# Patient Record
Sex: FEMALE | Race: WHITE | NOT HISPANIC OR LATINO | Employment: FULL TIME | URBAN - NONMETROPOLITAN AREA
[De-identification: names, ages, dates, MRNs, and addresses within clinical notes are randomized per-mention and may not be internally consistent; named-entity substitution may affect disease eponyms.]

---

## 2017-02-08 ENCOUNTER — OFFICE VISIT (OUTPATIENT)
Dept: PSYCHIATRY | Facility: OTHER | Age: 32
End: 2017-02-08
Attending: PSYCHIATRY & NEUROLOGY
Payer: COMMERCIAL

## 2017-02-08 VITALS
WEIGHT: 158 LBS | SYSTOLIC BLOOD PRESSURE: 134 MMHG | TEMPERATURE: 97.8 F | DIASTOLIC BLOOD PRESSURE: 96 MMHG | HEIGHT: 65 IN | BODY MASS INDEX: 26.33 KG/M2 | HEART RATE: 95 BPM

## 2017-02-08 DIAGNOSIS — F33.1 MAJOR DEPRESSIVE DISORDER, RECURRENT EPISODE, MODERATE (H): ICD-10-CM

## 2017-02-08 DIAGNOSIS — F90.2 ADHD (ATTENTION DEFICIT HYPERACTIVITY DISORDER), COMBINED TYPE: Primary | ICD-10-CM

## 2017-02-08 PROCEDURE — 99214 OFFICE O/P EST MOD 30 MIN: CPT | Performed by: PSYCHIATRY & NEUROLOGY

## 2017-02-08 RX ORDER — LAMOTRIGINE 25 MG/1
TABLET ORAL
Qty: 90 TABLET | Refills: 3 | Status: SHIPPED | OUTPATIENT
Start: 2017-02-08 | End: 2017-04-14

## 2017-02-08 RX ORDER — LISDEXAMFETAMINE DIMESYLATE 60 MG/1
60 CAPSULE ORAL EVERY MORNING
Qty: 30 CAPSULE | Refills: 0 | Status: SHIPPED | OUTPATIENT
Start: 2017-02-08 | End: 2017-05-12

## 2017-02-08 RX ORDER — LISDEXAMFETAMINE DIMESYLATE 60 MG/1
60 CAPSULE ORAL EVERY MORNING
Qty: 30 CAPSULE | Refills: 0 | Status: SHIPPED | OUTPATIENT
Start: 2017-04-05 | End: 2017-05-30

## 2017-02-08 RX ORDER — LISDEXAMFETAMINE DIMESYLATE 60 MG/1
60 CAPSULE ORAL EVERY MORNING
Qty: 30 CAPSULE | Refills: 0 | Status: SHIPPED | OUTPATIENT
Start: 2017-03-08 | End: 2017-05-30

## 2017-02-08 ASSESSMENT — ANXIETY QUESTIONNAIRES
6. BECOMING EASILY ANNOYED OR IRRITABLE: MORE THAN HALF THE DAYS
7. FEELING AFRAID AS IF SOMETHING AWFUL MIGHT HAPPEN: SEVERAL DAYS
IF YOU CHECKED OFF ANY PROBLEMS ON THIS QUESTIONNAIRE, HOW DIFFICULT HAVE THESE PROBLEMS MADE IT FOR YOU TO DO YOUR WORK, TAKE CARE OF THINGS AT HOME, OR GET ALONG WITH OTHER PEOPLE: VERY DIFFICULT
5. BEING SO RESTLESS THAT IT IS HARD TO SIT STILL: NOT AT ALL
2. NOT BEING ABLE TO STOP OR CONTROL WORRYING: NEARLY EVERY DAY
3. WORRYING TOO MUCH ABOUT DIFFERENT THINGS: MORE THAN HALF THE DAYS
GAD7 TOTAL SCORE: 12
1. FEELING NERVOUS, ANXIOUS, OR ON EDGE: MORE THAN HALF THE DAYS

## 2017-02-08 ASSESSMENT — PATIENT HEALTH QUESTIONNAIRE - PHQ9: 5. POOR APPETITE OR OVEREATING: MORE THAN HALF THE DAYS

## 2017-02-08 ASSESSMENT — PAIN SCALES - GENERAL: PAINLEVEL: NO PAIN (0)

## 2017-02-08 NOTE — NURSING NOTE
"Chief Complaint   Patient presents with     RECHECK     Mental health.       Initial /96 mmHg  Pulse 95  Temp(Src) 97.8  F (36.6  C) (Tympanic)  Ht 5' 5\" (1.651 m)  Wt 158 lb (71.668 kg)  BMI 26.29 kg/m2 Estimated body mass index is 26.29 kg/(m^2) as calculated from the following:    Height as of this encounter: 5' 5\" (1.651 m).    Weight as of this encounter: 158 lb (71.668 kg).  Medication Reconciliation: complete     SHEYLA BECERRA      "

## 2017-02-08 NOTE — PROGRESS NOTES
"  PSYCHIATRY CLINIC PROGRESS NOTE   20 minute medication management, more than 50% of time spent counseling patient on medications, medication side effects, symptom history and management   SUBJECTIVE / INTERIM HISTORY                                                                        Social- With BF and 10 yo daughter, BF kid is 12 yo  Children- [place] is 10 yo daughter     Last visit 11/9/16:      -- Continue vyanse 60 mg daily and gave script for dec and Jan. She still has script from Oct and will  Vyvanse this Friday - hasn't had the $ to  script       -- Continue Lamictal 75 mg HS    - off Lamictal hasn't been able to get from pharmacy  - was off Vyvanse for awhile - couldn't focus, wasn't able to get her work done at [employer]. \"my brain won't shut off\"  - moved in with parents try help given now  and working 60+ hours per week.  - dating Sukh , [employer], treats her great. Started dating again this summer  -  was off meds past couple years. Most recently: vyvanse and tried Luvox 100 mg HS. Since stopped taking Luvox because -> BF found her passed out, muscles tensed up. She notes no other meds on board or alcohol  - has tendency think meds not working and stop taking but did NOT do that this time    SUBSTANCE USE- reports no issues    SYMPTOMS-   SIB: Burning (last was in Oct '15) , Overwhelming, irritability, SIB  were becoming more of an issue again so she started working with therapist Hugh Uribe.  Irritability gotten better and moods bit better. Stress with uncertainly, lack of control  MEDICAL ROS-  Fatigue, headahces, constipation  MEDICAL / SURGICAL HISTORY                pregnant [if applicable]--no     Patient Active Problem List   Diagnosis     Family planning     Moderate major depression (H)     Anxiety     Chronic fatigue     Encounter for routine gynecological examination     Decreased libido     Fatigue     Smoker     ASCUS (atypical squamous cells of " "undetermined significance) on Pap smear     NO SHOW     ACP (advance care planning)     ALLERGY   Dust mites  MEDICATIONS                                                                                             Current Outpatient Prescriptions   Medication Sig     lisdexamfetamine (VYVANSE) 60 MG capsule Take 1 capsule (60 mg) by mouth every morning     No current facility-administered medications for this visit.       VITALS   /96 mmHg  Pulse 95  Temp(Src) 97.8  F (36.6  C) (Tympanic)  Ht 5' 5\" (1.651 m)  Wt 158 lb (71.668 kg)  BMI 26.29 kg/m2     PHQ9                     [unfilled]  LABS                                                                                                                           TSH   Date Value Ref Range Status   04/21/2014 1.26 0.34 - 4.82 mU/L Final   ]    Last Basic Metabolic Panel:  No results found for this basename: na   No results found for this basename: potassium  No results found for this basename: chloride  No results found for this basename: karla  No results found for this basename: co2  No results found for this basename: BUN  No results found for this basename: CR  No results found for this basename: glc   MENTAL STATUS EXAM                                                                                        Alert. Oriented to person, place, and date / time. Well groomed, calm, cooperative with good eye contact. No problems with speech or psychomotor behavior. Mood was described as anxious and affect was congruent to speech content and full range. Thought process, including associations, was unremarkable and thought content was devoid of suicidal and homicidal ideation and psychotic thought. No hallucinations. Insight was good. Judgment was intact and adequate for safety. Fund of knowledge was intact. Pt demonstrates no obvious problems with attention, concentration, language, recent or remote memory although these were not formally tested. "     ASSESSMENT                                                                                                      HISTORICAL:  Initial psych note 4/15/16         NOTES:   Notes: Luvox sedation     Vesna Fabian is a 32 yo with psychiatric hx of : depression, anxiety, ADHD, one hospital stay here at  around 2013. Started working with Hugh Uribe for psychotherapy several months and has started working on some trauma issues that happened and she has never discussed with others. Initial vist she ntoed Luvox didn't go well thus we agreed on d/c. We will continue vyvanse and get her back on Lamictal of which off since October '16 as insurance issues. We will need to taper back up to the 75 mg daily.    TREATMENT RISK STATEMENT:  The risks, benefits, alternatives and potential adverse effects have been explained and are understood by the pt.  The pt agrees to the treatment plan with the ability to do so.   The pt knows to call the clinic for any problems or access emergency care if needed.        DIAGNOSES                 (Use of Axes system will continue, even though absent from DSM 5)         Axis I   - MDD, recurrent, mod                 ADHD   Axis II  - per psych testing BPD traits  Axis III - heads, constipation  Axis IV-  Psychosocial Stressors include: mild  Axis V - Global Assessment of Functioning current: 55    PLAN                                                                                                                    1)  MEDICATIONS:         -- Continue vyanse 60 mg daily and gave script today 2/8/ 3/8 and 4/5. Restart Lamictal and we will start 25 mg daily 2 weeks; then 25 mg bid 2 weeks; then up to 75 mg daily       -- Continue Lamictal 75 mg HS    2)  THERAPY:  No Change. Working with Hugh Uribe    3)  LABS:  None    4)  PT MONITOR [call for probs]:  Worsening symptoms, SI/HI, SEs from meds    5)  REFERRALS [CD, medical, other]:  None    6)  RTC:    ~3 months

## 2017-02-09 ASSESSMENT — ANXIETY QUESTIONNAIRES: GAD7 TOTAL SCORE: 12

## 2017-02-09 ASSESSMENT — PATIENT HEALTH QUESTIONNAIRE - PHQ9: SUM OF ALL RESPONSES TO PHQ QUESTIONS 1-9: 8

## 2017-04-14 ENCOUNTER — HOSPITAL ENCOUNTER (EMERGENCY)
Facility: HOSPITAL | Age: 32
Discharge: HOME | End: 2017-04-14
Attending: PHYSICIAN ASSISTANT | Admitting: PHYSICIAN ASSISTANT
Payer: COMMERCIAL

## 2017-04-14 VITALS
DIASTOLIC BLOOD PRESSURE: 86 MMHG | SYSTOLIC BLOOD PRESSURE: 116 MMHG | TEMPERATURE: 99.3 F | RESPIRATION RATE: 18 BRPM | OXYGEN SATURATION: 98 %

## 2017-04-14 DIAGNOSIS — J10.1 INFLUENZA B: ICD-10-CM

## 2017-04-14 LAB
FLUAV+FLUBV AG SPEC QL: ABNORMAL
FLUAV+FLUBV AG SPEC QL: NEGATIVE
SPECIMEN SOURCE: ABNORMAL

## 2017-04-14 PROCEDURE — 99213 OFFICE O/P EST LOW 20 MIN: CPT | Performed by: PHYSICIAN ASSISTANT

## 2017-04-14 PROCEDURE — 87804 INFLUENZA ASSAY W/OPTIC: CPT | Performed by: FAMILY MEDICINE

## 2017-04-14 PROCEDURE — 99213 OFFICE O/P EST LOW 20 MIN: CPT

## 2017-04-14 RX ORDER — BENZONATATE 200 MG/1
200 CAPSULE ORAL 3 TIMES DAILY PRN
Qty: 21 CAPSULE | Refills: 0 | Status: SHIPPED | OUTPATIENT
Start: 2017-04-14 | End: 2017-05-30

## 2017-04-14 RX ORDER — OSELTAMIVIR PHOSPHATE 75 MG/1
75 CAPSULE ORAL 2 TIMES DAILY
Qty: 10 CAPSULE | Refills: 0 | Status: SHIPPED | OUTPATIENT
Start: 2017-04-14 | End: 2017-04-19

## 2017-04-14 ASSESSMENT — ENCOUNTER SYMPTOMS
EYES NEGATIVE: 1
CHILLS: 1
MYALGIAS: 1
RHINORRHEA: 1
SINUS PRESSURE: 1
COUGH: 1
CARDIOVASCULAR NEGATIVE: 1
SORE THROAT: 0
PSYCHIATRIC NEGATIVE: 1
SHORTNESS OF BREATH: 0
HEADACHES: 1
WHEEZING: 0
FEVER: 1
FATIGUE: 1

## 2017-04-14 NOTE — DISCHARGE INSTRUCTIONS
- Tamiflu   - Tessalon as directed to help suppress cough.   - Deep intentional breaths and coughing helps to ensure fresh air to lung bases, so suppress cough only to sleep or if it is causing significant discomfort.   - Plenty of fluids/broth as water is the best expectorant.  - REST to help support immune function.    * Recheck with primary care in 5-7 days with poor improvement. To emergency department with worsening.

## 2017-04-14 NOTE — ED NOTES
DATE:  4/14/2017   TIME OF RECEIPT FROM LAB:  1350  LAB TEST:  Influenza  LAB VALUE:  Positive for B  RESULTS GIVEN WITH READ-BACK TO (PROVIDER):  PRUDENCE Rico  TIME LAB VALUE REPORTED TO PROVIDER: 1063

## 2017-04-14 NOTE — ED PROVIDER NOTES
"  History     Chief Complaint   Patient presents with     Cough     c/o cough, body aches     The history is provided by the patient. No  was used.     Vesna Fabian is a 31 year old female who presents with 1 day fevers, cough, HA and body aches. Pt reports she feels like she has been \"run over\". She denies shortness of breath. Cough is hacking in nature. She had to leave work she felt so miserable today. No known ill contacts.     I have reviewed the Medications, Allergies, Past Medical and Surgical History, and Social History in the Epic system.    Review of Systems   Constitutional: Positive for chills, fatigue and fever.   HENT: Positive for congestion, rhinorrhea and sinus pressure. Negative for ear pain and sore throat.    Eyes: Negative.    Respiratory: Positive for cough. Negative for shortness of breath and wheezing.    Cardiovascular: Negative.    Musculoskeletal: Positive for myalgias.   Skin: Negative.    Neurological: Positive for headaches.   Psychiatric/Behavioral: Negative.        Physical Exam   BP: 116/86  Heart Rate: 89  Temp: 99.3  F (37.4  C)  Resp: 18  SpO2: 98 %  Physical Exam   Constitutional: She is oriented to person, place, and time. She appears well-developed and well-nourished. No distress (appears tired, but NONtoxic).   HENT:   Head: Atraumatic.   Right Ear: External ear normal.   Left Ear: External ear normal.   Mouth/Throat: Oropharynx is clear and moist. No oral lesions. No trismus in the jaw. No posterior oropharyngeal edema or posterior oropharyngeal erythema (cobblestoning).   Eyes: Conjunctivae are normal.   Cardiovascular: Normal rate and normal heart sounds.    Pulmonary/Chest: Effort normal and breath sounds normal. She has no wheezes.   Neurological: She is alert and oriented to person, place, and time.   Skin: Skin is warm and dry.   Psychiatric: She has a normal mood and affect.   Nursing note and vitals reviewed.      ED Course     ED Course "     Procedures    Labs Ordered and Resulted from Time of ED Arrival Up to the Time of Departure from the ED   INFLUENZA A/B ANTIGEN - Abnormal; Notable for the following:        Result Value    Influenza B   (*)     Value: Positive   Critical Value called to and read back by  SHANIA SEAMAN AT 1349 04/14/17 AJE  Test results must be correlated with clinical data. If necessary, results should   be confirmed by a molecular assay or viral culture.      All other components within normal limits       Assessments & Plan (with Medical Decision Making)     I have reviewed the nursing notes.    I have reviewed the findings, diagnosis, plan and need for follow up with the patient.    Discharge Medication List as of 4/14/2017  1:54 PM      START taking these medications    Details   oseltamivir (TAMIFLU) 75 MG capsule Take 1 capsule (75 mg) by mouth 2 times daily for 5 days, Disp-10 capsule, R-0, E-Prescribe      benzonatate (TESSALON) 200 MG capsule Take 1 capsule (200 mg) by mouth 3 times daily as needed for cough, Disp-21 capsule, R-0, E-Prescribe             Final diagnoses:   Influenza B   Pt treated as above. Note for work. Rest, fluids, ibu/tylenol for pain fevers. Follow up with Primary Care Provider in 3-7 days with poor improvement. Seek attention sooner with worsening despite treatment. Patient verbally educated and given appropriate education sheets for each of the diagnoses and has no questions.    Dago Turcios PA-C   4/14/2017   7:30 PM      4/14/2017   [facility]     Dago Turcios PA  04/14/17 1930

## 2017-04-14 NOTE — ED AVS SNAPSHOT
[facility]    [address] [address]    Phone:  [phone]                                       Vesna Fabian   MRN: [id number]    Department:  [facility]   Date of Visit:  4/14/2017           After Visit Summary Signature Page     I have received my discharge instructions, and my questions have been answered. I have discussed any challenges I see with this plan with the nurse or doctor.    ..........................................................................................................................................  Patient/Patient Representative Signature      ..........................................................................................................................................  Patient Representative Print Name and Relationship to Patient    ..................................................               ................................................  Date                                            Time    ..........................................................................................................................................  Reviewed by Signature/Title    ...................................................              ..............................................  Date                                                            Time

## 2017-04-14 NOTE — ED AVS SNAPSHOT
[facility]    [address] [place] [address]    Phone:  [phone]                                       Vesna Fabian   MRN: [id number]    Department:  [facility]   Date of Visit:  4/14/2017           Patient Information     Date Of Birth          1985        Your diagnoses for this visit were:     Influenza B        You were seen by Dago Turcios PA.      Follow-up Information     Follow up with Dixie Chun PA In 1 week.    Specialty:  Family Practice    Why:  As needed    Contact information:    [facility]  [address]  [place] [address]  [phone]          Discharge Instructions       - Tamiflu   - Tessalon as directed to help suppress cough.   - Deep intentional breaths and coughing helps to ensure fresh air to lung bases, so suppress cough only to sleep or if it is causing significant discomfort.   - Plenty of fluids/broth as water is the best expectorant.  - REST to help support immune function.    * Recheck with primary care in 5-7 days with poor improvement. To emergency department with worsening.      Discharge References/Attachments     INFLUENZA  (ENGLISH)      Future Appointments        Provider Department Dept Phone Center    4/17/2017 1:00 PM Candelaria Clarke MD [facility] [place] [phone]         Review of your medicines      START taking        Dose / Directions Last dose taken    benzonatate 200 MG capsule   Commonly known as:  TESSALON   Dose:  200 mg   Quantity:  21 capsule        Take 1 capsule (200 mg) by mouth 3 times daily as needed for cough   Refills:  0        oseltamivir 75 MG capsule   Commonly known as:  TAMIFLU   Dose:  75 mg   Quantity:  10 capsule        Take 1 capsule (75 mg) by mouth 2 times daily for 5 days   Refills:  0          Our records show that you are taking the medicines listed below. If these are incorrect, please call your family doctor or clinic.        Dose /  Directions Last dose taken    * lisdexamfetamine 60 MG capsule   Commonly known as:  VYVANSE   Dose:  60 mg   Quantity:  30 capsule        Take 1 capsule (60 mg) by mouth every morning   Refills:  0        * lisdexamfetamine 60 MG capsule   Commonly known as:  VYVANSE   Dose:  60 mg   Quantity:  30 capsule        Take 1 capsule (60 mg) by mouth every morning   Refills:  0        * lisdexamfetamine 60 MG capsule   Commonly known as:  VYVANSE   Dose:  60 mg   Quantity:  30 capsule        Take 1 capsule (60 mg) by mouth every morning   Refills:  0        * Notice:  This list has 3 medication(s) that are the same as other medications prescribed for you. Read the directions carefully, and ask your doctor or other care provider to review them with you.            Prescriptions were sent or printed at these locations (2 Prescriptions)                   [facility]  [address]   [address] [facility] [place] [address]    Telephone:  [phone]   Fax:  [phone]   Hours:                  E-Prescribed (2 of 2)         oseltamivir (TAMIFLU) 75 MG capsule               benzonatate (TESSALON) 200 MG capsule                Procedures and tests performed during your visit     Influenza A/B antigen      Orders Needing Specimen Collection     None      Pending Results     No orders found from 4/12/2017 to 4/15/2017.            Pending Culture Results     No orders found from 4/12/2017 to 4/15/2017.            Thank you for choosing [facility]       Thank you for choosing [facility] for your care. Our goal is always to provide you with excellent care. Hearing back from our patients is one way we can continue to improve our services. Please take a few minutes to complete the written survey that you may receive in the mail after you visit with us. Thank you!        [facility] Information     [facility] lets you send messages to your doctor, view your test results, renew your prescriptions, schedule appointments  "and more. To sign up, go to [email or web address] . Click on \"Log in\" on the left side of the screen, which will take you to the Welcome page. Then click on \"Sign up Now\" on the right side of the page.     You will be asked to enter the access code listed below, as well as some personal information. Please follow the directions to create your username and password.     Your access code is: [email or web address]  Expires: 2017  1:54 PM     Your access code will  in 90 days. If you need help or a new code, please call your [facility] clinic or [phone].        Care EveryWhere ID     This is your Care EveryWhere ID. This could be used by other organizations to access your [facility] medical records  [id number]        After Visit Summary       This is your record. Keep this with you and show to your community pharmacist(s) and doctor(s) at your next visit.                  "

## 2017-04-14 NOTE — LETTER
[facility]  [address]  [place] [address]  [phone]    2017    Vesna Fabian  [address]  [place] [address]  [phone] (home)     : 1985      To Whom it may concern:    Vesna Fabian was seen in our Emergency Department today, 2017.  I expect her condition to improve over the next 3-5 days.  She may return to work 24 hours after fevers have resolved.         Sincerely,        Dago Turcios PA-C   2017   1:55 PM

## 2017-05-12 DIAGNOSIS — F90.2 ADHD (ATTENTION DEFICIT HYPERACTIVITY DISORDER), COMBINED TYPE: ICD-10-CM

## 2017-05-15 RX ORDER — LISDEXAMFETAMINE DIMESYLATE 60 MG/1
CAPSULE ORAL
Qty: 30 CAPSULE | Refills: 0 | Status: SHIPPED | OUTPATIENT
Start: 2017-05-15 | End: 2017-05-30

## 2017-05-28 NOTE — PROGRESS NOTES
"  PSYCHIATRY CLINIC PROGRESS NOTE   20 minute medication management, more than 50% of time spent counseling patient on medications, medication side effects, symptom history and management   SUBJECTIVE / INTERIM HISTORY                                                                        Social- With BF and 10 yo daughter, BF kid is 12 yo  Children- [place] is 10 yo daughter     Last visit 2/8/17:   Continue vyanse 60 mg daily and gave script today 2/8/ 3/8 and 4/5. 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 \"my brain won't shut off\"  - moved in with parents try help given now  and working 60+ hours per week.  - WAS dating Sukh, they broke up Jan '17    SUBSTANCE USE- reports no issues    SYMPTOMS-   SIB: Burning (last was in Oct '15) , Overwhelming, irritability, SIB  were becoming more of an issue again so she started working with therapist Hugh Uribe.  Irritability gotten better and moods bit better. Stress with uncertainly, lack of control  MEDICAL ROS-  Fatigue, headahces, constipation  MEDICAL / SURGICAL HISTORY                pregnant [if applicable]--no     Patient Active Problem List   Diagnosis     Family planning     Moderate major depression (H)     Anxiety     Chronic fatigue     Encounter for routine gynecological examination     Decreased libido     Fatigue     Smoker     ASCUS (atypical squamous cells of undetermined significance) on Pap smear     NO SHOW     ACP (advance care planning)     ALLERGY   Dust mites  MEDICATIONS                                                                                         " "    Current Outpatient Prescriptions   Medication Sig     VYVANSE 60 MG capsule TAKE 1 CAPSULE BY MOUTH EVERY MORNING     No current facility-administered medications for this visit.        VITALS   /76 (BP Location: Right arm, Patient Position: Chair, Cuff Size: Adult Regular)  Pulse 89  Temp 97.6  F (36.4  C) (Tympanic)  Ht 5' 5\" (1.651 m)  Wt 158 lb (71.7 kg)  BMI 26.29 kg/m2     PHQ9                     [unfilled]  LABS                                                                                                                           TSH   Date Value Ref Range Status   04/21/2014 1.26 0.34 - 4.82 mU/L Final   ]    CBC RESULTS:   Recent Labs   Lab Test  04/21/14   1113   WBC  7.9   RBC  5.06   HGB  14.9   HCT  43.6   MCV  86   MCH  29.4   MCHC  34.2   RDW  13.2   PLT  380       MENTAL STATUS EXAM                                                                                        Alert. Oriented to person, place, and date / time. Well groomed, calm, cooperative with good eye contact. No problems with speech or psychomotor behavior. Mood was described as stressed and affect was congruent to speech content and full range. Thought process, including associations, was unremarkable and thought content was devoid of suicidal and homicidal ideation and psychotic thought. No hallucinations. Insight was good. Judgment was intact and adequate for safety. Fund of knowledge was intact. Pt demonstrates no obvious problems with attention, concentration, language, recent or remote memory although these were not formally tested.     ASSESSMENT                                                                                                      HISTORICAL:  Initial psych note 4/15/16         NOTES:   Notes: Luvox sedation     Vesna Fabian is a 30 yo with psychiatric hx of : depression, anxiety, ADHD, one hospital stay here at  around 2013. Was working with Hugh Uribe for psychotherapy several months on " some trauma issues that happened and she has never discussed with others. Initial vist she ntoed Luvox didn't go well thus we agreed on d/c. We will continue vyvanse and get her back on Lamictal -> she has had insurance issues getting the dose of Lamictal. We will need to taper back up to the 75 mg daily.    TREATMENT RISK STATEMENT:  The risks, benefits, alternatives and potential adverse effects have been explained and are understood by the pt.  The pt agrees to the treatment plan with the ability to do so.   The pt knows to call the clinic for any problems or access emergency care if needed.        DIAGNOSES                 (Use of Axes system will continue, even though absent from DSM 5)         Axis I   - MDD, recurrent, mod                 ADHD   Axis II  - per psych testing BPD traits  Axis III - heads, constipation  Axis IV-  Psychosocial Stressors include: mild  Axis V - Global Assessment of Functioning current: 60    PLAN                                                                                                                    1)  MEDICATIONS:         -- Continue vyanse 60 mg daily and last script was 5/15/17so today gave scripts for:  6/12/17, 7/10/17, 8/10/17. Restart Lamictal and we will start 25 mg daily 2 weeks; then 25 mg bid 2 weeks; then up to 75 mg daily       -    2)  THERAPY:  No Change. Working with Hugh Uribe    3)  LABS:  None    4)  PT MONITOR [call for probs]:  Worsening symptoms, SI/HI, SEs from meds    5)  REFERRALS [CD, medical, other]:  None    6)  RTC:    ~3 months

## 2017-05-30 ENCOUNTER — OFFICE VISIT (OUTPATIENT)
Dept: PSYCHIATRY | Facility: OTHER | Age: 32
End: 2017-05-30
Attending: PSYCHIATRY & NEUROLOGY
Payer: COMMERCIAL

## 2017-05-30 VITALS
HEART RATE: 89 BPM | SYSTOLIC BLOOD PRESSURE: 124 MMHG | BODY MASS INDEX: 26.33 KG/M2 | DIASTOLIC BLOOD PRESSURE: 76 MMHG | WEIGHT: 158 LBS | HEIGHT: 65 IN | TEMPERATURE: 97.6 F

## 2017-05-30 DIAGNOSIS — F90.2 ADHD (ATTENTION DEFICIT HYPERACTIVITY DISORDER), COMBINED TYPE: ICD-10-CM

## 2017-05-30 PROCEDURE — 99214 OFFICE O/P EST MOD 30 MIN: CPT | Performed by: PSYCHIATRY & NEUROLOGY

## 2017-05-30 RX ORDER — LISDEXAMFETAMINE DIMESYLATE 60 MG/1
60 CAPSULE ORAL EVERY MORNING
Qty: 30 CAPSULE | Refills: 0 | Status: SHIPPED | OUTPATIENT
Start: 2017-08-10 | End: 2017-08-16

## 2017-05-30 RX ORDER — LISDEXAMFETAMINE DIMESYLATE 60 MG/1
CAPSULE ORAL
Qty: 30 CAPSULE | Refills: 0 | Status: SHIPPED | OUTPATIENT
Start: 2017-06-12 | End: 2017-08-16

## 2017-05-30 RX ORDER — LISDEXAMFETAMINE DIMESYLATE 60 MG/1
60 CAPSULE ORAL EVERY MORNING
Qty: 30 CAPSULE | Refills: 0 | Status: SHIPPED | OUTPATIENT
Start: 2017-07-10 | End: 2017-08-16

## 2017-05-30 ASSESSMENT — ANXIETY QUESTIONNAIRES
3. WORRYING TOO MUCH ABOUT DIFFERENT THINGS: SEVERAL DAYS
GAD7 TOTAL SCORE: 7
1. FEELING NERVOUS, ANXIOUS, OR ON EDGE: SEVERAL DAYS
2. NOT BEING ABLE TO STOP OR CONTROL WORRYING: SEVERAL DAYS
IF YOU CHECKED OFF ANY PROBLEMS ON THIS QUESTIONNAIRE, HOW DIFFICULT HAVE THESE PROBLEMS MADE IT FOR YOU TO DO YOUR WORK, TAKE CARE OF THINGS AT HOME, OR GET ALONG WITH OTHER PEOPLE: SOMEWHAT DIFFICULT
6. BECOMING EASILY ANNOYED OR IRRITABLE: MORE THAN HALF THE DAYS
7. FEELING AFRAID AS IF SOMETHING AWFUL MIGHT HAPPEN: SEVERAL DAYS
5. BEING SO RESTLESS THAT IT IS HARD TO SIT STILL: NOT AT ALL

## 2017-05-30 ASSESSMENT — PAIN SCALES - GENERAL: PAINLEVEL: NO PAIN (0)

## 2017-05-30 ASSESSMENT — PATIENT HEALTH QUESTIONNAIRE - PHQ9: 5. POOR APPETITE OR OVEREATING: SEVERAL DAYS

## 2017-05-30 NOTE — MR AVS SNAPSHOT
"              After Visit Summary   5/30/2017    Vesna Fabian    MRN: [id number]           Patient Information     Date Of Birth          1985        Visit Information        Provider Department      5/30/2017 8:00 AM Candelaria Clarke MD [facility]        Today's Diagnoses     ADHD (attention deficit hyperactivity disorder), combined type           Follow-ups after your visit        Your next 10 appointments already scheduled     Aug 16, 2017  8:00 AM CDT   (Arrive by 7:45 AM)   Return Visit with Candelaria Clarke MD   [facility] [place] ([facility] [place] [facility])    [address] [place] [address]  [place] [address] [phone]              Who to contact     If you have questions or need follow up information about today's clinic visit or your schedule please contact [facility] directly at [phone].  Normal or non-critical lab and imaging results will be communicated to you by MyChart, letter or phone within 4 business days after the clinic has received the results. If you do not hear from us within 7 days, please contact the clinic through MyChart or phone. If you have a critical or abnormal lab result, we will notify you by phone as soon as possible.  Submit refill requests through [employer] or call your pharmacy and they will forward the refill request to us. Please allow 3 business days for your refill to be completed.          Additional Information About Your Visit        MyChart Information     [employer] lets you send messages to your doctor, view your test results, renew your prescriptions, schedule appointments and more. To sign up, go to [email or web address] . Click on \"Log in\" on the left side of the screen, which will take you to the Welcome page. Then click on \"Sign up Now\" on the right side of the page.     You will be asked to enter the access code listed below, as well as some personal information. Please follow the directions to create your username and " "password.     Your access code is: [email or web address]  Expires: 2017  1:54 PM     Your access code will  in 90 days. If you need help or a new code, please call your [place] clinic or [phone].        Care EveryWhere ID     This is your Care EveryWhere ID. This could be used by other organizations to access your [facility] medical records  [id number]        Your Vitals Were     Pulse Temperature Height BMI (Body Mass Index)          89 97.6  F (36.4  C) (Tympanic) 5' 5\" (1.651 m) 26.29 kg/m2         Blood Pressure from Last 3 Encounters:   17 124/76   17 116/86   17 (!) 134/96    Weight from Last 3 Encounters:   17 158 lb (71.7 kg)   17 158 lb (71.7 kg)   16 180 lb (81.6 kg)              Today, you had the following     No orders found for display         Today's Medication Changes          These changes are accurate as of: 17  8:24 AM.  If you have any questions, ask your nurse or doctor.               These medicines have changed or have updated prescriptions.        Dose/Directions    * lisdexamfetamine 60 MG capsule   Commonly known as:  VYVANSE   This may have changed:  See the new instructions.   Used for:  ADHD (attention deficit hyperactivity disorder), combined type   Changed by:  Candelaria Clarke MD        Start taking on:  2017   TAKE 1 CAPSULE BY MOUTH EVERY MORNING   Quantity:  30 capsule   Refills:  0       * lisdexamfetamine 60 MG capsule   Commonly known as:  VYVANSE   This may have changed:  Another medication with the same name was changed. Make sure you understand how and when to take each.   Used for:  ADHD (attention deficit hyperactivity disorder), combined type   Changed by:  Candelaria Clarke MD        Dose:  60 mg   Start taking on:  7/10/2017   Take 1 capsule (60 mg) by mouth every morning   Quantity:  30 capsule   Refills:  0       * lisdexamfetamine 60 MG capsule   Commonly known as:  VYVANSE   This may have changed:  Another " medication with the same name was changed. Make sure you understand how and when to take each.   Used for:  ADHD (attention deficit hyperactivity disorder), combined type   Changed by:  Candelaria Clarke MD        Dose:  60 mg   Start taking on:  8/10/2017   Take 1 capsule (60 mg) by mouth every morning   Quantity:  30 capsule   Refills:  0       * Notice:  This list has 3 medication(s) that are the same as other medications prescribed for you. Read the directions carefully, and ask your doctor or other care provider to review them with you.         Where to get your medicines      Some of these will need a paper prescription and others can be bought over the counter.  Ask your nurse if you have questions.     Bring a paper prescription for each of these medications     lisdexamfetamine 60 MG capsule    lisdexamfetamine 60 MG capsule    lisdexamfetamine 60 MG capsule                Primary Care Provider Office Phone # Fax #    PRUDENCE Braswell [phone]       [facility] [address] [place] [address]        Thank you!     Thank you for choosing [facility]  for your care. Our goal is always to provide you with excellent care. Hearing back from our patients is one way we can continue to improve our services. Please take a few minutes to complete the written survey that you may receive in the mail after your visit with us. Thank you!             Your Updated Medication List - Protect others around you: Learn how to safely use, store and throw away your medicines at www.disposemymeds.org.          This list is accurate as of: 5/30/17  8:24 AM.  Always use your most recent med list.                   Brand Name Dispense Instructions for use    * lisdexamfetamine 60 MG capsule   Start taking on:  6/12/2017    VYVANSE    30 capsule    TAKE 1 CAPSULE BY MOUTH EVERY MORNING       * lisdexamfetamine 60 MG capsule   Start taking on:  7/10/2017    VYVANSE    30 capsule     Take 1 capsule (60 mg) by mouth every morning       * lisdexamfetamine 60 MG capsule   Start taking on:  8/10/2017    VYVANSE    30 capsule    Take 1 capsule (60 mg) by mouth every morning       * Notice:  This list has 3 medication(s) that are the same as other medications prescribed for you. Read the directions carefully, and ask your doctor or other care provider to review them with you.

## 2017-05-30 NOTE — NURSING NOTE
"Chief Complaint   Patient presents with     RECHECK     Mental health.       Initial /76 (BP Location: Right arm, Patient Position: Chair, Cuff Size: Adult Regular)  Pulse 89  Temp 97.6  F (36.4  C) (Tympanic)  Ht 5' 5\" (1.651 m)  Wt 158 lb (71.7 kg)  BMI 26.29 kg/m2 Estimated body mass index is 26.29 kg/(m^2) as calculated from the following:    Height as of this encounter: 5' 5\" (1.651 m).    Weight as of this encounter: 158 lb (71.7 kg).  Medication Reconciliation: complete     SHEYLA BECERRA      "

## 2017-05-31 ASSESSMENT — PATIENT HEALTH QUESTIONNAIRE - PHQ9: SUM OF ALL RESPONSES TO PHQ QUESTIONS 1-9: 9

## 2017-05-31 ASSESSMENT — ANXIETY QUESTIONNAIRES: GAD7 TOTAL SCORE: 7

## 2017-08-16 ENCOUNTER — OFFICE VISIT (OUTPATIENT)
Dept: PSYCHIATRY | Facility: OTHER | Age: 32
End: 2017-08-16
Attending: PSYCHIATRY & NEUROLOGY
Payer: COMMERCIAL

## 2017-08-16 VITALS
WEIGHT: 150 LBS | TEMPERATURE: 98.4 F | DIASTOLIC BLOOD PRESSURE: 78 MMHG | HEART RATE: 86 BPM | SYSTOLIC BLOOD PRESSURE: 114 MMHG | BODY MASS INDEX: 24.99 KG/M2 | HEIGHT: 65 IN

## 2017-08-16 DIAGNOSIS — F90.2 ADHD (ATTENTION DEFICIT HYPERACTIVITY DISORDER), COMBINED TYPE: ICD-10-CM

## 2017-08-16 DIAGNOSIS — F33.1 MAJOR DEPRESSIVE DISORDER, RECURRENT EPISODE, MODERATE (H): Primary | ICD-10-CM

## 2017-08-16 PROCEDURE — 99214 OFFICE O/P EST MOD 30 MIN: CPT | Performed by: PSYCHIATRY & NEUROLOGY

## 2017-08-16 RX ORDER — LISDEXAMFETAMINE DIMESYLATE 60 MG/1
60 CAPSULE ORAL EVERY MORNING
Qty: 30 CAPSULE | Refills: 0 | Status: SHIPPED | OUTPATIENT
Start: 2017-09-07 | End: 2017-08-16

## 2017-08-16 RX ORDER — LISDEXAMFETAMINE DIMESYLATE 60 MG/1
60 CAPSULE ORAL EVERY MORNING
Qty: 30 CAPSULE | Refills: 0 | Status: SHIPPED | OUTPATIENT
Start: 2017-11-03 | End: 2017-11-15

## 2017-08-16 RX ORDER — LISDEXAMFETAMINE DIMESYLATE 60 MG/1
60 CAPSULE ORAL EVERY MORNING
Qty: 30 CAPSULE | Refills: 0 | Status: SHIPPED | OUTPATIENT
Start: 2017-09-07 | End: 2017-11-15

## 2017-08-16 RX ORDER — LISDEXAMFETAMINE DIMESYLATE 60 MG/1
60 CAPSULE ORAL EVERY MORNING
Qty: 30 CAPSULE | Refills: 0 | Status: SHIPPED | OUTPATIENT
Start: 2017-10-05 | End: 2017-11-15

## 2017-08-16 RX ORDER — LAMOTRIGINE 25 MG/1
TABLET ORAL
Qty: 90 TABLET | Refills: 5 | Status: SHIPPED | OUTPATIENT
Start: 2017-08-16 | End: 2017-11-15

## 2017-08-16 ASSESSMENT — PATIENT HEALTH QUESTIONNAIRE - PHQ9
SUM OF ALL RESPONSES TO PHQ QUESTIONS 1-9: 2
5. POOR APPETITE OR OVEREATING: SEVERAL DAYS

## 2017-08-16 ASSESSMENT — ANXIETY QUESTIONNAIRES
6. BECOMING EASILY ANNOYED OR IRRITABLE: MORE THAN HALF THE DAYS
5. BEING SO RESTLESS THAT IT IS HARD TO SIT STILL: NOT AT ALL
7. FEELING AFRAID AS IF SOMETHING AWFUL MIGHT HAPPEN: SEVERAL DAYS
1. FEELING NERVOUS, ANXIOUS, OR ON EDGE: SEVERAL DAYS
3. WORRYING TOO MUCH ABOUT DIFFERENT THINGS: MORE THAN HALF THE DAYS
2. NOT BEING ABLE TO STOP OR CONTROL WORRYING: MORE THAN HALF THE DAYS
IF YOU CHECKED OFF ANY PROBLEMS ON THIS QUESTIONNAIRE, HOW DIFFICULT HAVE THESE PROBLEMS MADE IT FOR YOU TO DO YOUR WORK, TAKE CARE OF THINGS AT HOME, OR GET ALONG WITH OTHER PEOPLE: SOMEWHAT DIFFICULT
GAD7 TOTAL SCORE: 9

## 2017-08-16 ASSESSMENT — PAIN SCALES - GENERAL: PAINLEVEL: NO PAIN (0)

## 2017-08-16 NOTE — NURSING NOTE
"Chief Complaint   Patient presents with     RECHECK     Mental health.       Initial /78 (BP Location: Right arm, Patient Position: Sitting, Cuff Size: Adult Regular)  Pulse 86  Temp 98.4  F (36.9  C) (Tympanic)  Ht 5' 5\" (1.651 m)  Wt 150 lb (68 kg)  BMI 24.96 kg/m2 Estimated body mass index is 24.96 kg/(m^2) as calculated from the following:    Height as of this encounter: 5' 5\" (1.651 m).    Weight as of this encounter: 150 lb (68 kg).  Medication Reconciliation: complete     SHEYLA BECERRA      "

## 2017-08-16 NOTE — MR AVS SNAPSHOT
"              After Visit Summary   8/16/2017    Vesna Fabian    MRN: [id number]           Patient Information     Date Of Birth          1985        Visit Information        Provider Department      8/16/2017 8:00 AM [facility]        Today's Diagnoses     Major depressive disorder, recurrent episode, moderate (H)    -  1    ADHD (attention deficit hyperactivity disorder), combined type           Follow-ups after your visit        Your next 10 appointments already scheduled     Nov 15, 2017  8:00 AM CST   (Arrive by 7:45 AM)   Return Visit with Candelaria Clarke MD   [facility] ([facility] )    [address] [place] [address]  [place] [id number] [phone]              Who to contact     If you have questions or need follow up information about today's clinic visit or your schedule please contact [facility] directly at [phone].  Normal or non-critical lab and imaging results will be communicated to you by MyChart, letter or phone within 4 business days after the clinic has received the results. If you do not hear from us within 7 days, please contact the clinic through MyChart or phone. If you have a critical or abnormal lab result, we will notify you by phone as soon as possible.  Submit refill requests through [employer] or call your pharmacy and they will forward the refill request to us. Please allow 3 business days for your refill to be completed.          Additional Information About Your Visit        MyChart Information     [employer] lets you send messages to your doctor, view your test results, renew your prescriptions, schedule appointments and more. To sign up, go to [email or web address] . Click on \"Log in\" on the left side of the screen, which will take you to the Welcome page. Then click on \"Sign up Now\" on the right side of the page.     You will be asked to enter the access code listed below, as well as some " "personal information. Please follow the directions to create your username and password.     Your access code is: [email or web address]  Expires: 2017  8:22 AM     Your access code will  in 90 days. If you need help or a new code, please call your [facility] clinic or [phone].        Care EveryWhere ID     This is your Care EveryWhere ID. This could be used by other organizations to access your [facility] medical records  [id number]        Your Vitals Were     Pulse Temperature Height BMI (Body Mass Index)          86 98.4  F (36.9  C) (Tympanic) 5' 5\" (1.651 m) 24.96 kg/m2         Blood Pressure from Last 3 Encounters:   17 114/78   17 124/76   17 116/86    Weight from Last 3 Encounters:   17 150 lb (68 kg)   17 158 lb (71.7 kg)   17 158 lb (71.7 kg)              Today, you had the following     No orders found for display         Today's Medication Changes          These changes are accurate as of: 17  8:22 AM.  If you have any questions, ask your nurse or doctor.               Start taking these medicines.        Dose/Directions    lamoTRIgine 25 MG tablet   Commonly known as:  LaMICtal   Used for:  Major depressive disorder, recurrent episode, moderate (H)   Started by:  Candelaria Clarke MD        Take 1 tablet daily for 2 weeks then increase to 2 tablets daily for 2 weeks then increase to 3 tablets daily   Quantity:  90 tablet   Refills:  5         These medicines have changed or have updated prescriptions.        Dose/Directions    * lisdexamfetamine 60 MG capsule   Commonly known as:  VYVANSE   This may have changed:  Another medication with the same name was changed. Make sure you understand how and when to take each.   Used for:  ADHD (attention deficit hyperactivity disorder), combined type   Changed by:  Candelaria Clarke MD        Dose:  60 mg   Start taking on:  2017   Take 1 capsule (60 mg) by mouth every morning   Quantity:  30 capsule   Refills:  0 "       * lisdexamfetamine 60 MG capsule   Commonly known as:  VYVANSE   This may have changed:  Another medication with the same name was changed. Make sure you understand how and when to take each.   Used for:  ADHD (attention deficit hyperactivity disorder), combined type   Changed by:  Candelaria Clarke MD        Dose:  60 mg   Start taking on:  10/5/2017   Take 1 capsule (60 mg) by mouth every morning   Quantity:  30 capsule   Refills:  0       * lisdexamfetamine 60 MG capsule   Commonly known as:  VYVANSE   This may have changed:    - how much to take  - how to take this  - when to take this  - additional instructions   Used for:  ADHD (attention deficit hyperactivity disorder), combined type   Changed by:  Candelaria Clarke MD        Dose:  60 mg   Start taking on:  11/3/2017   Take 1 capsule (60 mg) by mouth every morning   Quantity:  30 capsule   Refills:  0       * Notice:  This list has 3 medication(s) that are the same as other medications prescribed for you. Read the directions carefully, and ask your doctor or other care provider to review them with you.         Where to get your medicines      These medications were sent to [facility] - [facility] - [address]  [address] [place] [address]     Phone:  [phone]     lamoTRIgine 25 MG tablet         Some of these will need a paper prescription and others can be bought over the counter.  Ask your nurse if you have questions.     Bring a paper prescription for each of these medications     lisdexamfetamine 60 MG capsule    lisdexamfetamine 60 MG capsule    lisdexamfetamine 60 MG capsule                Primary Care Provider Office Phone # Fax #    PRUDENCE Braswell [phone]       [facility] [address]  [place] [address]        Equal Access to Services     BOB NEWMAN AH: Sadia Castillo, cas mejia, shawn sampson, lauri vázquez  ah. So [facility] [phone].    ATENCIÓN: Si habla ynes, tiene a ojeda disposición servicios gratuitos de asistencia lingüística. Emanuel al [phone].    We comply with applicable federal civil rights laws and [place] laws. We do not discriminate on the basis of race, color, national origin, age, disability sex, sexual orientation or gender identity.            Thank you!     Thank you for choosing [facility]  for your care. Our goal is always to provide you with excellent care. Hearing back from our patients is one way we can continue to improve our services. Please take a few minutes to complete the written survey that you may receive in the mail after your visit with us. Thank you!             Your Updated Medication List - Protect others around you: Learn how to safely use, store and throw away your medicines at www.disposemymeds.org.          This list is accurate as of: 8/16/17  8:22 AM.  Always use your most recent med list.                   Brand Name Dispense Instructions for use Diagnosis    lamoTRIgine 25 MG tablet    LaMICtal    90 tablet    Take 1 tablet daily for 2 weeks then increase to 2 tablets daily for 2 weeks then increase to 3 tablets daily    Major depressive disorder, recurrent episode, moderate (H)       * lisdexamfetamine 60 MG capsule   Start taking on:  9/7/2017    VYVANSE    30 capsule    Take 1 capsule (60 mg) by mouth every morning    ADHD (attention deficit hyperactivity disorder), combined type       * lisdexamfetamine 60 MG capsule   Start taking on:  10/5/2017    VYVANSE    30 capsule    Take 1 capsule (60 mg) by mouth every morning    ADHD (attention deficit hyperactivity disorder), combined type       * lisdexamfetamine 60 MG capsule   Start taking on:  11/3/2017    VYVANSE    30 capsule    Take 1 capsule (60 mg) by mouth every morning    ADHD (attention deficit hyperactivity disorder), combined type       * Notice:  This list has 3 medication(s) that are the same  as other medications prescribed for you. Read the directions carefully, and ask your doctor or other care provider to review them with you.

## 2017-08-16 NOTE — PROGRESS NOTES
"  PSYCHIATRY CLINIC PROGRESS NOTE   20 minute medication management, more than 50% of time spent counseling patient on medications, medication side effects, symptom history and management   SUBJECTIVE / INTERIM HISTORY                                                                        Social- with parents and Rafael Children- Rafael is 10 yo daughter     Last visit 2/8/17:   Continue vyanse 60 mg daily and gave script today 2/8/ 3/8 and 4/5. 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 \"my brain won't shut off\"  - moved in with parents try help given now  and working 60+ hours per week.  - WAS dating Sukh, they broke up Jan '17    SUBSTANCE USE- reports no issues    SYMPTOMS-   SIB: Burning (last was in Oct '15) , Overwhelming, irritability, SIB  were becoming more of an issue again so she started working with therapist Hugh Uribe.  Irritability gotten better and moods bit better. Stress with uncertainly, lack of control  MEDICAL ROS-  Fatigue, headahces, constipation  MEDICAL / SURGICAL HISTORY                pregnant [if applicable]--no     Patient Active Problem List   Diagnosis     Family planning     Moderate major depression (H)     Anxiety     Chronic fatigue     Encounter for routine gynecological examination     Decreased libido     Fatigue     Smoker     ASCUS (atypical squamous cells of undetermined significance) on Pap smear     NO SHOW     ACP (advance care planning)     ALLERGY   Dust mites  MEDICATIONS                                                                                             Current Outpatient Prescriptions " "  Medication Sig     lisdexamfetamine (VYVANSE) 60 MG capsule Take 1 capsule (60 mg) by mouth every morning     No current facility-administered medications for this visit.        VITALS   /78 (BP Location: Right arm, Patient Position: Sitting, Cuff Size: Adult Regular)  Pulse 86  Temp 98.4  F (36.9  C) (Tympanic)  Ht 5' 5\" (1.651 m)  Wt 150 lb (68 kg)  BMI 24.96 kg/m2     PHQ9                     [unfilled]  LABS                                                                                                                           TSH   Date Value Ref Range Status   04/21/2014 1.26 0.34 - 4.82 mU/L Final   ]    CBC RESULTS:   Recent Labs   Lab Test  04/21/14   1113   WBC  7.9   RBC  5.06   HGB  14.9   HCT  43.6   MCV  86   MCH  29.4   MCHC  34.2   RDW  13.2   PLT  380       MENTAL STATUS EXAM                                                                                        Alert. Oriented to person, place, and date / time. Well groomed, calm, cooperative with good eye contact. No problems with speech or psychomotor behavior. Mood was described as stressed and affect was congruent to speech content and full range. Thought process, including associations, was unremarkable and thought content was devoid of suicidal and homicidal ideation and psychotic thought. No hallucinations. Insight was good. Judgment was intact and adequate for safety. Fund of knowledge was intact. Pt demonstrates no obvious problems with attention, concentration, language, recent or remote memory although these were not formally tested.     ASSESSMENT                                                                                                      HISTORICAL:  Initial psych note 4/15/16         NOTES:   Notes: Luvox sedation     Vesna Fabian is a 30 yo with psychiatric hx of : depression, anxiety, ADHD, one hospital stay here at  around 2013. Was working with Hugh Uribe for psychotherapy several months on some trauma " issues that happened and she has never discussed with others. Initial vist she ntoed Luvox didn't go well thus we agreed on d/c. We will continue vyvanse and get her back on Lamictal -> she has had insurance issues getting the dose of Lamictal. We will need to taper back up to the 75 mg daily.    TREATMENT RISK STATEMENT:  The risks, benefits, alternatives and potential adverse effects have been explained and are understood by the pt.  The pt agrees to the treatment plan with the ability to do so.   The pt knows to call the clinic for any problems or access emergency care if needed.        DIAGNOSES                 (Use of Axes system will continue, even though absent from DSM 5)         Axis I   - MDD, recurrent, mod                 ADHD   Axis II  - per psych testing BPD traits  Axis III - heads, constipation  Axis IV-  Psychosocial Stressors include: mild  Axis V - Global Assessment of Functioning current: 60    PLAN                                                                                                                    1)  MEDICATIONS:         -- Continue vyanse 60 mg daily and last script was 8/10/17 so gave scripts today: 9/7/17, 10/5/17, 11/3/17 . Restart Lamictal and we will start 25 mg daily 2 weeks; then 25 mg bid 2 weeks; then up to 75 mg daily    2)  THERAPY:  No Change. Working with Hugh Uribe    3)  LABS:  None    4)  PT MONITOR [call for probs]:  Worsening symptoms, SI/HI, SEs from meds    5)  REFERRALS [CD, medical, other]:  None    6)  RTC:    ~3 months

## 2017-08-17 ASSESSMENT — ANXIETY QUESTIONNAIRES: GAD7 TOTAL SCORE: 9

## 2017-11-15 ENCOUNTER — OFFICE VISIT (OUTPATIENT)
Dept: PSYCHIATRY | Facility: OTHER | Age: 32
End: 2017-11-15
Attending: PSYCHIATRY & NEUROLOGY
Payer: COMMERCIAL

## 2017-11-15 VITALS
BODY MASS INDEX: 24.3 KG/M2 | SYSTOLIC BLOOD PRESSURE: 106 MMHG | WEIGHT: 146 LBS | DIASTOLIC BLOOD PRESSURE: 84 MMHG | HEART RATE: 91 BPM | TEMPERATURE: 98.7 F

## 2017-11-15 DIAGNOSIS — F90.2 ADHD (ATTENTION DEFICIT HYPERACTIVITY DISORDER), COMBINED TYPE: ICD-10-CM

## 2017-11-15 PROCEDURE — 99214 OFFICE O/P EST MOD 30 MIN: CPT | Performed by: PSYCHIATRY & NEUROLOGY

## 2017-11-15 RX ORDER — LISDEXAMFETAMINE DIMESYLATE 60 MG/1
60 CAPSULE ORAL EVERY MORNING
Qty: 30 CAPSULE | Refills: 0 | Status: SHIPPED | OUTPATIENT
Start: 2017-12-01 | End: 2018-02-28

## 2017-11-15 RX ORDER — LISDEXAMFETAMINE DIMESYLATE 60 MG/1
60 CAPSULE ORAL EVERY MORNING
Qty: 30 CAPSULE | Refills: 0 | Status: SHIPPED | OUTPATIENT
Start: 2018-01-26 | End: 2018-02-28

## 2017-11-15 RX ORDER — LISDEXAMFETAMINE DIMESYLATE 60 MG/1
60 CAPSULE ORAL EVERY MORNING
Qty: 30 CAPSULE | Refills: 0 | Status: SHIPPED | OUTPATIENT
Start: 2017-12-29 | End: 2018-02-28

## 2017-11-15 RX ORDER — LISDEXAMFETAMINE DIMESYLATE 60 MG/1
60 CAPSULE ORAL EVERY MORNING
Qty: 30 CAPSULE | Refills: 0 | Status: SHIPPED | OUTPATIENT
Start: 2017-12-01 | End: 2017-11-15

## 2017-11-15 ASSESSMENT — PAIN SCALES - GENERAL: PAINLEVEL: NO PAIN (0)

## 2017-11-15 ASSESSMENT — ANXIETY QUESTIONNAIRES
3. WORRYING TOO MUCH ABOUT DIFFERENT THINGS: MORE THAN HALF THE DAYS
5. BEING SO RESTLESS THAT IT IS HARD TO SIT STILL: SEVERAL DAYS
1. FEELING NERVOUS, ANXIOUS, OR ON EDGE: MORE THAN HALF THE DAYS
6. BECOMING EASILY ANNOYED OR IRRITABLE: NEARLY EVERY DAY
2. NOT BEING ABLE TO STOP OR CONTROL WORRYING: MORE THAN HALF THE DAYS
GAD7 TOTAL SCORE: 13
IF YOU CHECKED OFF ANY PROBLEMS ON THIS QUESTIONNAIRE, HOW DIFFICULT HAVE THESE PROBLEMS MADE IT FOR YOU TO DO YOUR WORK, TAKE CARE OF THINGS AT HOME, OR GET ALONG WITH OTHER PEOPLE: SOMEWHAT DIFFICULT
7. FEELING AFRAID AS IF SOMETHING AWFUL MIGHT HAPPEN: MORE THAN HALF THE DAYS

## 2017-11-15 ASSESSMENT — PATIENT HEALTH QUESTIONNAIRE - PHQ9
SUM OF ALL RESPONSES TO PHQ QUESTIONS 1-9: 15
5. POOR APPETITE OR OVEREATING: SEVERAL DAYS

## 2017-11-15 NOTE — PROGRESS NOTES
"  PSYCHIATRY CLINIC PROGRESS NOTE   20 minute medication management, more than 50% of time spent counseling patient on medications, medication side effects, symptom history and management   SUBJECTIVE / INTERIM HISTORY                                                                        Social- with parents and Rafael Children- Rafael is 12 yo daughter     Last visit August:      -- Continue vyanse 60 mg daily and last script was 8/10/17 so gave scripts today: 9/7/17, 10/5/17, 11/3/17 . 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 \"my brain won't shut off\"  - moved in with parents try help given now  and working 60+ hours per week.  - WAS dating Sukh, they broke up Jan '17    SUBSTANCE USE- reports no issues    SYMPTOMS-   SIB: Burning (last was in Oct '15) , Overwhelming, irritability, SIB  were becoming more of an issue again so she started working with therapist Hugh Uribe.  Irritability gotten better and moods bit better. Stress with uncertainly, lack of control  MEDICAL ROS-  Fatigue, headahces, constipation  MEDICAL / SURGICAL HISTORY                pregnant [if applicable]--no     Patient Active Problem List   Diagnosis     Family planning     Moderate major depression (H)     Anxiety     Chronic fatigue     Encounter for routine gynecological examination     Decreased libido     Fatigue     Smoker     ASCUS (atypical squamous cells of undetermined significance) on Pap smear     NO SHOW     ACP (advance care planning)     ALLERGY   Dust mites  MEDICATIONS                                                                                             Current Outpatient Prescriptions "   Medication Sig     [START ON 12/29/2017] lisdexamfetamine (VYVANSE) 60 MG capsule Take 1 capsule (60 mg) by mouth every morning     [START ON 1/26/2018] lisdexamfetamine (VYVANSE) 60 MG capsule Take 1 capsule (60 mg) by mouth every morning     [START ON 12/1/2017] lisdexamfetamine (VYVANSE) 60 MG capsule Take 1 capsule (60 mg) by mouth every morning     No current facility-administered medications for this visit.        VITALS   /84  Pulse 91  Temp 98.7  F (37.1  C) (Tympanic)  Wt 146 lb (66.2 kg)  BMI 24.3 kg/m2     PHQ9                     [unfilled]  LABS                                                                                                                           TSH   Date Value Ref Range Status   04/21/2014 1.26 0.34 - 4.82 mU/L Final   ]    CBC RESULTS:   Recent Labs   Lab Test  04/21/14   1113   WBC  7.9   RBC  5.06   HGB  14.9   HCT  43.6   MCV  86   MCH  29.4   MCHC  34.2   RDW  13.2   PLT  380       MENTAL STATUS EXAM                                                                                        Alert. Oriented to person, place, and date / time. Well groomed, calm, cooperative with good eye contact. No problems with speech or psychomotor behavior. Mood was described as stressed and affect was congruent to speech content and full range. Thought process, including associations, was unremarkable and thought content was devoid of suicidal and homicidal ideation and psychotic thought. No hallucinations. Insight was good. Judgment was intact and adequate for safety. Fund of knowledge was intact. Pt demonstrates no obvious problems with attention, concentration, language, recent or remote memory although these were not formally tested.     ASSESSMENT                                                                                                      HISTORICAL:  Initial psych note 4/15/16         NOTES:   Notes: Luvox sedation     Vesna Fabian is a 33 yo with psychiatric hx  of : depression, anxiety, ADHD, one hospital stay here at  around 2013. Was working with Hugh Uribe for psychotherapy several months on some trauma issues that happened and she has never discussed with others. Initial vist she ntoed Luvox didn't go well thus we agreed on d/c. We will continue vyvanse and for now Vesna doesn't want to get back on Lamictal so I held off on restarting it.    TREATMENT RISK STATEMENT:  The risks, benefits, alternatives and potential adverse effects have been explained and are understood by the pt.  The pt agrees to the treatment plan with the ability to do so.   The pt knows to call the clinic for any problems or access emergency care if needed.        DIAGNOSES                 (Use of Axes system will continue, even though absent from DSM 5)          MDD, recurrent, mod                 ADHD       PLAN                                                                                                                    1)  MEDICATIONS:         -- Continue vyanse 60 mg daily and last script was 11/3/ so gave 3 scripts today 12/1, 12/29 and 1/ 26/18. We didn't restart Lamictal today  2)  THERAPY:  No Change. Working with Hugh Uribe    3)  LABS:  None    4)  PT MONITOR [call for probs]:  Worsening symptoms, SI/HI, SEs from meds    5)  REFERRALS [CD, medical, other]:  None    6)  RTC:    ~3 months

## 2017-11-15 NOTE — MR AVS SNAPSHOT
"              After Visit Summary   11/15/2017    Vesna Fabian    MRN: [id number]           Patient Information     Date Of Birth          1985        Visit Information        Provider Department      11/15/2017 8:00 AM Candelaria Clarke MD [facility]        Today's Diagnoses     ADHD (attention deficit hyperactivity disorder), combined type           Follow-ups after your visit        Your next 10 appointments already scheduled     Jan 29, 2018  8:00 AM CST   (Arrive by 7:45 AM)   Return Visit with Candelaria Clarke MD   [facility] [place] ([facility] [place] )    [address] [place] [address]  [place] [address]   [phone]              Who to contact     If you have questions or need follow up information about today's clinic visit or your schedule please contact [facility] directly at [phone].  Normal or non-critical lab and imaging results will be communicated to you by MyChart, letter or phone within 4 business days after the clinic has received the results. If you do not hear from us within 7 days, please contact the clinic through MyChart or phone. If you have a critical or abnormal lab result, we will notify you by phone as soon as possible.  Submit refill requests through [employer] or call your pharmacy and they will forward the refill request to us. Please allow 3 business days for your refill to be completed.          Additional Information About Your Visit        MyChart Information     [employer] lets you send messages to your doctor, view your test results, renew your prescriptions, schedule appointments and more. To sign up, go to [email or web address] . Click on \"Log in\" on the left side of the screen, which will take you to the Welcome page. Then click on \"Sign up Now\" on the right side of the page.     You will be asked to enter the access code listed below, as well as some personal information. Please follow the directions to create your " username and password.     Your access code is: [email or web address]  Expires: 2018  8:27 AM     Your access code will  in 90 days. If you need help or a new code, please call your [facility] clinic or [phone].        Care EveryWhere ID     This is your Care EveryWhere ID. This could be used by other organizations to access your [facility] medical records  [id number]        Your Vitals Were     Pulse Temperature BMI (Body Mass Index)             91 98.7  F (37.1  C) (Tympanic) 24.3 kg/m2          Blood Pressure from Last 3 Encounters:   11/15/17 106/84   17 114/78   17 124/76    Weight from Last 3 Encounters:   11/15/17 146 lb (66.2 kg)   17 150 lb (68 kg)   17 158 lb (71.7 kg)              Today, you had the following     No orders found for display         Today's Medication Changes          These changes are accurate as of: 11/15/17  8:27 AM.  If you have any questions, ask your nurse or doctor.               These medicines have changed or have updated prescriptions.        Dose/Directions    * lisdexamfetamine 60 MG capsule   Commonly known as:  VYVANSE   This may have changed:  You were already taking a medication with the same name, and this prescription was added. Make sure you understand how and when to take each.   Used for:  ADHD (attention deficit hyperactivity disorder), combined type   Changed by:  Candelaria Clarke MD        Dose:  60 mg   Start taking on:  2017   Take 1 capsule (60 mg) by mouth every morning   Quantity:  30 capsule   Refills:  0       * lisdexamfetamine 60 MG capsule   Commonly known as:  VYVANSE   This may have changed:  Another medication with the same name was added. Make sure you understand how and when to take each.   Used for:  ADHD (attention deficit hyperactivity disorder), combined type   Changed by:  Candelaria Clarke MD        Dose:  60 mg   Start taking on:  2017   Take 1 capsule (60 mg) by mouth every morning   Quantity:  30  capsule   Refills:  0       * lisdexamfetamine 60 MG capsule   Commonly known as:  VYVANSE   This may have changed:  Another medication with the same name was added. Make sure you understand how and when to take each.   Used for:  ADHD (attention deficit hyperactivity disorder), combined type   Changed by:  Candelaria Clarke MD        Dose:  60 mg   Start taking on:  1/26/2018   Take 1 capsule (60 mg) by mouth every morning   Quantity:  30 capsule   Refills:  0       * Notice:  This list has 3 medication(s) that are the same as other medications prescribed for you. Read the directions carefully, and ask your doctor or other care provider to review them with you.         Where to get your medicines      Some of these will need a paper prescription and others can be bought over the counter.  Ask your nurse if you have questions.     Bring a paper prescription for each of these medications     lisdexamfetamine 60 MG capsule    lisdexamfetamine 60 MG capsule    lisdexamfetamine 60 MG capsule                Primary Care Provider Office Phone # Fax #    PRUDENCE Braswell [phone]       [facility] [address] [place] [address]        Equal Access to Services     [facility]: Hadii aad ku hadasho Soomaali, waaxda luqadaha, qaybta kaalmada adeegyada, lauri mora hayalexandren jordan vázquez . So [facility] [phone].    ATENCIÓN: Si habla español, tiene a ojeda disposición servicios gratuitos de asistencia lingüística. Emanuel al [phone].    We comply with applicable federal civil rights laws and [place] laws. We do not discriminate on the basis of race, color, national origin, age, disability, sex, sexual orientation, or gender identity.            Thank you!     Thank you for choosing [facility]  for your care. Our goal is always to provide you with excellent care. Hearing back from our patients is one way we can continue to improve our services. Please take a few  minutes to complete the written survey that you may receive in the mail after your visit with us. Thank you!             Your Updated Medication List - Protect others around you: Learn how to safely use, store and throw away your medicines at www.disposemymeds.org.          This list is accurate as of: 11/15/17  8:27 AM.  Always use your most recent med list.                   Brand Name Dispense Instructions for use Diagnosis    * lisdexamfetamine 60 MG capsule   Start taking on:  12/1/2017    VYVANSE    30 capsule    Take 1 capsule (60 mg) by mouth every morning    ADHD (attention deficit hyperactivity disorder), combined type       * lisdexamfetamine 60 MG capsule   Start taking on:  12/29/2017    VYVANSE    30 capsule    Take 1 capsule (60 mg) by mouth every morning    ADHD (attention deficit hyperactivity disorder), combined type       * lisdexamfetamine 60 MG capsule   Start taking on:  1/26/2018    VYVANSE    30 capsule    Take 1 capsule (60 mg) by mouth every morning    ADHD (attention deficit hyperactivity disorder), combined type       * Notice:  This list has 3 medication(s) that are the same as other medications prescribed for you. Read the directions carefully, and ask your doctor or other care provider to review them with you.

## 2017-11-15 NOTE — NURSING NOTE
"Chief Complaint   Patient presents with     RECHECK     Mental health.       Initial /84 (BP Location: Right arm, Patient Position: Sitting, Cuff Size: Adult Regular)  Pulse 91  Temp 98.7  F (37.1  C) (Tympanic)  Wt 146 lb (66.2 kg)  BMI 24.3 kg/m2 Estimated body mass index is 24.3 kg/(m^2) as calculated from the following:    Height as of 8/16/17: 5' 5\" (1.651 m).    Weight as of this encounter: 146 lb (66.2 kg).  Medication Reconciliation: complete     SHEYLA BECERRA      "

## 2017-11-15 NOTE — PROGRESS NOTES
"  PSYCHIATRY CLINIC PROGRESS NOTE   20 minute medication management, more than 50% of time spent counseling patient on medications, medication side effects, symptom history and management   SUBJECTIVE / INTERIM HISTORY                                                                        Social- with parents and Rafael Children- Rafael is 12 yo daughter     Last :    -- Continue vyanse 60 mg daily and last script was 8/10/17 so gave scripts today: 9/7/17, 10/5/17, 11/3/17 . 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  - off Lamictal hasn't been able to get from pharmacy: has to go to [place] because insurance to get it  - was off Vyvanse for awhile - couldn't focus, wasn't able to get her work done at [employer]. \"my brain won't shut off\"  - moved in with parents try help given now  and working 60+ hours per week.  - WAS dating Sukh, they broke up Jan '17    SUBSTANCE USE- reports no issues    SYMPTOMS-   SIB: Burning (last was in Oct '15) , Overwhelming, irritability, SIB  were becoming more of an issue again so she started working with therapist Hugh Uribe.  Irritability gotten better and moods bit better. Stress with uncertainly, lack of control  MEDICAL ROS-  Fatigue, headahces, constipation  MEDICAL / SURGICAL HISTORY                pregnant [if applicable]--no     Patient Active Problem List   Diagnosis     Family planning     Moderate major depression (H)     Anxiety     Chronic fatigue     Encounter for routine gynecological examination     Decreased libido     Fatigue     Smoker     ASCUS (atypical squamous cells of undetermined significance) on Pap smear     NO SHOW     ACP (advance care planning)     ALLERGY   Dust mites  MEDICATIONS                                                                                             Current " Outpatient Prescriptions   Medication Sig     lisdexamfetamine (VYVANSE) 60 MG capsule Take 1 capsule (60 mg) by mouth every morning     No current facility-administered medications for this visit.        VITALS   /84  Pulse 91  Temp 98.7  F (37.1  C) (Tympanic)  Wt 146 lb (66.2 kg)  BMI 24.3 kg/m2     PHQ9                     [unfilled]  LABS                                                                                                                           TSH   Date Value Ref Range Status   04/21/2014 1.26 0.34 - 4.82 mU/L Final   ]    CBC RESULTS:   Recent Labs   Lab Test  04/21/14   1113   WBC  7.9   RBC  5.06   HGB  14.9   HCT  43.6   MCV  86   MCH  29.4   MCHC  34.2   RDW  13.2   PLT  380       MENTAL STATUS EXAM                                                                                        Alert. Oriented to person, place, and date / time. Well groomed, calm, cooperative with good eye contact. No problems with speech or psychomotor behavior. Mood was described as stressed and affect was congruent to speech content and full range. Thought process, including associations, was unremarkable and thought content was devoid of suicidal and homicidal ideation and psychotic thought. No hallucinations. Insight was good. Judgment was intact and adequate for safety. Fund of knowledge was intact. Pt demonstrates no obvious problems with attention, concentration, language, recent or remote memory although these were not formally tested.     ASSESSMENT                                                                                                      HISTORICAL:  Initial psych note 4/15/16         NOTES:   Notes: Luvox sedation     Vesna Fabian is a 31 yo with psychiatric hx of : depression, anxiety, ADHD, one hospital stay here at  around 2013. Was working with Hugh Uribe for psychotherapy several months on some trauma issues that happened and she has never discussed with others. Initial  vist she ntoed Luvox didn't go well thus we agreed on d/c. We will continue vyvanse and get her back on Lamictal -> she has had insurance issues getting the dose of Lamictal. We will need to taper back up to the 75 mg daily.    TREATMENT RISK STATEMENT:  The risks, benefits, alternatives and potential adverse effects have been explained and are understood by the pt.  The pt agrees to the treatment plan with the ability to do so.   The pt knows to call the clinic for any problems or access emergency care if needed.        DIAGNOSES                 (Use of Axes system will continue, even though absent from DSM 5)         Axis I   - MDD, recurrent, mod                 ADHD   Axis II  - per psych testing BPD traits  Axis III - heads, constipation  Axis IV-  Psychosocial Stressors include: mild  Axis V - Global Assessment of Functioning current: 60    PLAN                                                                                                                    1)  MEDICATIONS:         -- Continue vyanse 60 mg daily and last script 11/3/17 and gave scripts today 12/1/117, 12/29/17, and 1/26/18. Restart Lamictal and we will start 25 mg daily 2 weeks; then 25 mg bid 2 weeks; then up to 75 mg daily    2)  THERAPY:  No Change. Working with Hugh Uribe    3)  LABS:  None    4)  PT MONITOR [call for probs]:  Worsening symptoms, SI/HI, SEs from meds    5)  REFERRALS [CD, medical, other]:  None    6)  RTC:    ~3 months

## 2017-11-16 ASSESSMENT — ANXIETY QUESTIONNAIRES: GAD7 TOTAL SCORE: 13

## 2017-11-26 ENCOUNTER — HEALTH MAINTENANCE LETTER (OUTPATIENT)
Age: 32
End: 2017-11-26

## 2018-01-26 RX ORDER — LISDEXAMFETAMINE DIMESYLATE 60 MG/1
CAPSULE ORAL
Qty: 30 CAPSULE | Refills: 0 | OUTPATIENT
Start: 2018-01-26

## 2018-02-28 ENCOUNTER — OFFICE VISIT (OUTPATIENT)
Dept: PSYCHIATRY | Facility: OTHER | Age: 33
End: 2018-02-28
Attending: PSYCHIATRY & NEUROLOGY
Payer: COMMERCIAL

## 2018-02-28 VITALS
WEIGHT: 146 LBS | TEMPERATURE: 99.2 F | DIASTOLIC BLOOD PRESSURE: 88 MMHG | BODY MASS INDEX: 24.3 KG/M2 | HEART RATE: 91 BPM | SYSTOLIC BLOOD PRESSURE: 114 MMHG

## 2018-02-28 DIAGNOSIS — F90.2 ADHD (ATTENTION DEFICIT HYPERACTIVITY DISORDER), COMBINED TYPE: ICD-10-CM

## 2018-02-28 DIAGNOSIS — Z71.6 TOBACCO ABUSE COUNSELING: ICD-10-CM

## 2018-02-28 DIAGNOSIS — Z72.0 TOBACCO ABUSE: ICD-10-CM

## 2018-02-28 PROCEDURE — 99213 OFFICE O/P EST LOW 20 MIN: CPT | Performed by: PSYCHIATRY & NEUROLOGY

## 2018-02-28 RX ORDER — LISDEXAMFETAMINE DIMESYLATE 60 MG/1
60 CAPSULE ORAL EVERY MORNING
Qty: 30 CAPSULE | Refills: 0 | Status: SHIPPED | OUTPATIENT
Start: 2018-02-28 | End: 2018-10-30

## 2018-02-28 RX ORDER — NICOTINE 21 MG/24HR
1 PATCH, TRANSDERMAL 24 HOURS TRANSDERMAL EVERY 24 HOURS
Qty: 30 PATCH | Refills: 3 | Status: SHIPPED | OUTPATIENT
Start: 2018-02-28 | End: 2018-03-09

## 2018-02-28 RX ORDER — LISDEXAMFETAMINE DIMESYLATE 60 MG/1
60 CAPSULE ORAL EVERY MORNING
Qty: 30 CAPSULE | Refills: 0 | Status: SHIPPED | OUTPATIENT
Start: 2018-03-28 | End: 2018-10-30

## 2018-02-28 RX ORDER — LISDEXAMFETAMINE DIMESYLATE 60 MG/1
60 CAPSULE ORAL EVERY MORNING
Qty: 30 CAPSULE | Refills: 0 | Status: SHIPPED | OUTPATIENT
Start: 2018-04-25 | End: 2018-10-30

## 2018-02-28 ASSESSMENT — ANXIETY QUESTIONNAIRES
2. NOT BEING ABLE TO STOP OR CONTROL WORRYING: NOT AT ALL
6. BECOMING EASILY ANNOYED OR IRRITABLE: NOT AT ALL
5. BEING SO RESTLESS THAT IT IS HARD TO SIT STILL: NOT AT ALL
1. FEELING NERVOUS, ANXIOUS, OR ON EDGE: NOT AT ALL
GAD7 TOTAL SCORE: 0
3. WORRYING TOO MUCH ABOUT DIFFERENT THINGS: NOT AT ALL
7. FEELING AFRAID AS IF SOMETHING AWFUL MIGHT HAPPEN: NOT AT ALL

## 2018-02-28 ASSESSMENT — PAIN SCALES - GENERAL: PAINLEVEL: NO PAIN (0)

## 2018-02-28 ASSESSMENT — PATIENT HEALTH QUESTIONNAIRE - PHQ9: 5. POOR APPETITE OR OVEREATING: NOT AT ALL

## 2018-02-28 NOTE — NURSING NOTE
"Chief Complaint   Patient presents with     RECHECK     mental health.     Smoking Cessation     Patient interested in the \"quit for life plan\"       Initial /88 (BP Location: Left arm, Patient Position: Sitting, Cuff Size: Adult Regular)  Pulse 91  Temp 99.2  F (37.3  C) (Tympanic)  Wt 146 lb (66.2 kg)  BMI 24.3 kg/m2 Estimated body mass index is 24.3 kg/(m^2) as calculated from the following:    Height as of 8/16/17: 5' 5\" (1.651 m).    Weight as of this encounter: 146 lb (66.2 kg).  Medication Reconciliation: complete     SHEYLA BECERRA      "

## 2018-02-28 NOTE — MR AVS SNAPSHOT
After Visit Summary   2/28/2018    Vesna Fabian    MRN: [id number]           Patient Information     Date Of Birth          1985        Visit Information        Provider Department      2/28/2018 4:40 PM Candelaria lCarke MD [facility] [place]        Today's Diagnoses     Tobacco abuse        Tobacco abuse counseling        ADHD (attention deficit hyperactivity disorder), combined type          Care Instructions      HOW TO QUIT SMOKING  Smoking is one of the hardest habits to break. About half of all those who have ever smoked have been able to quit, and most of those (about 70%) who still smoke want to quit. Here are some of the best ways to stop smoking.     KEEP TRYING:  It takes most smokers about 8 tries before they are finally able to fully quit. So, the more often you try and fail, the better your chance of quitting the next time! So, don't give up!    GO COLD TURKEY:  Most ex-smokers quit cold turkey. Trying to cut back gradually doesn't seem to work as well, perhaps because it continues the smoking habit. Also, it is possible to fool yourself by inhaling more while smoking fewer cigarettes. This results in the same amount of nicotine in your body!    GET SUPPORT:  Support programs can make an important difference, especially for the heavy smoker. These groups offer lectures, methods to change your behavior and peer support. Call the free national Quitline for more information. 800-QUIT-NOW (346-351-6129). Low-cost or free programs are offered by many hospitals, local chapters of the American Lung Association (246-040-0632) and the American Cancer Society (926-247-6784). Support at home is important too. Non-smokers can help by offering praise and encouragement. If the smoker fails to quit, encourage them to try again!    OVER-THE-COUNTER MEDICINES:  For those who can't quit on their own, Nicotine Replacement Therapy (NRT) may make quitting much easier. Certain aids such as the  nicotine patch, gum and lozenge are available without a prescription. However, it is best to use these under the guidance of your doctor. The skin patch provides a steady supply of nicotine to the body. Nicotine gum and lozenge gives temporary bursts of low levels of nicotine. Both methods take the edge off the craving for cigarettes. WARNING: If you feel symptoms of nicotine overdose, such as nausea, vomiting, dizziness, weakness, or fast heartbeat, stop using these and see your doctor.    PRESCRIPTION MEDICINES:  After evaluating your smoking patterns and prior attempts at quitting, your doctor may offer a prescription medicine such as bupropion (Zyban, Wellbutrin), varenicline (Chantix, Champix), a niocotine inhaler or nasal spray. Each has its unique advantage and side effects which your doctor can review with you.    HEALTH BENEFITS OF QUITTING:  The benefits of quitting start right away and keep improving the longer you go without smokin minutes: blood pressure and pulse return to normal  8 hours: oxygen levels return to normal  2 days: ability to smell and taste begins to improve as damaged nerves start to regrow  2-3 weeks: circulation and lung function improves  1-9 months: decreased cough, congestion and shortness of breath; less tired  1 year: risk of heart attack decreases by half  5 years: risk of lung cancer decreases by half; risk of stroke becomes the same as a non-smoker  For information about how to quit smoking, visit the following links:  National Cancer [place] ,   Clearing the Air, Quit Smoking Today   - an online booklet. http://www.smokefree.gov/pubs/clearing_the_air.pdf  Smokefree.gov http://smokefree.gov/  QuitNet http://www.quitnet.com/    7403-1387 Malik Nino, [address] [place] [address], [place] [address]. All rights reserved. This information is not intended as a substitute for professional medical care. Always follow your healthcare professional's instructions.    The Benefits  of Living Smoke Free  What do you want to gain from quitting? Check off some reasons to quit.  Health Benefits  ___ Reduce my risk of lung cancer, heart disease, chronic lung disease  ___ Have fewer wrinkles and softer skin  ___ Improve my sense of taste and smell  ___ For pregnant women--reduce the risk of having a miscarriage, stillbirth, premature birth, or low-birth-weight baby  Personal Benefits  ___ Feel more in control of my life  ___ Have better-smelling hair, breath, clothes, home, and car  ___ Save time by not having to take smoke breaks, buy cigarettes, or hunt for a light  ___ Have whiter teeth  Family Benefits  ___ Reduce my children s respiratory tract infections  ___ Set a good example for my children  ___ Reduce my family s cancer risk  Financial Benefits  ___ Save hundreds of dollars each year that would be spent on cigarettes  ___ Save money on medical bills  ___ Save on life, health, and car insurance premiums    Those Dollars Add Up!  Cigarettes are expensive, and getting more expensive all the time. Do you realize how much money you are spending on cigarettes per year? What is the average amount you spend on a pack of cigarettes? What is the average number of packs that you smoke per day? Using your answers to these questions, fill in this formula to help you find out:  ($ _____ per pack) ×  ( _____ number of packs per day) × (365 days) =  $ _____ yearly cost of smoking  Besides tobacco, there are other costs, including extra cleaning bills and replacement costs for clothing and furniture; medical expenses for smoking-related illnesses; and higher health, life, and car insurance premiums.    Cigars and Pipes Count Too!  Cigars and pipes are also dangerous. So are smokeless (chewing) tobacco and snuff. All of these products contain nicotine, a highly addictive substance that has harmful effects on your body. Quitting smoking means giving up all tobacco products.      5760-8321 Malik Nino,  "[address] [place] [address]. All rights reserved. This information is not intended as a substitute for professional medical care. Always follow your healthcare professional's instructions.          Follow-ups after your visit        Who to contact     If you have questions or need follow up information about today's clinic visit or your schedule please contact [facility] [place] directly at [phone].  Normal or non-critical lab and imaging results will be communicated to you by MyChart, letter or phone within 4 business days after the clinic has received the results. If you do not hear from us within 7 days, please contact the clinic through [employer] or phone. If you have a critical or abnormal lab result, we will notify you by phone as soon as possible.  Submit refill requests through [employer] or call your pharmacy and they will forward the refill request to us. Please allow 3 business days for your refill to be completed.          Additional Information About Your Visit        [email or web address] Information     [employer] lets you send messages to your doctor, view your test results, renew your prescriptions, schedule appointments and more. To sign up, go to [email or web address] . Click on \"Log in\" on the left side of the screen, which will take you to the Welcome page. Then click on \"Sign up Now\" on the right side of the page.     You will be asked to enter the access code listed below, as well as some personal information. Please follow the directions to create your username and password.     Your access code is: [email or web address]  Expires: 2018  5:32 PM     Your access code will  in 90 days. If you need help or a new code, please call your [facility] clinic or [phone].        Care EveryWhere ID     This is your Care EveryWhere ID. This could be used by other organizations to access your [facility] medical records  [id number]        Your Vitals Were     Pulse Temperature BMI (Body Mass Index) "             91 99.2  F (37.3  C) (Tympanic) 24.3 kg/m2          Blood Pressure from Last 3 Encounters:   02/28/18 114/88   11/15/17 106/84   08/16/17 114/78    Weight from Last 3 Encounters:   02/28/18 146 lb (66.2 kg)   11/15/17 146 lb (66.2 kg)   08/16/17 150 lb (68 kg)              We Performed the Following     Tobacco Cessation - Order to Satisfy Health Maintenance          Today's Medication Changes          These changes are accurate as of 2/28/18  5:32 PM.  If you have any questions, ask your nurse or doctor.               Start taking these medicines.        Dose/Directions    nicotine 21 MG/24HR 24 hr patch   Commonly known as:  EQL NICOTINE   Used for:  Tobacco abuse   Started by:  Candelaria Clarke MD        Dose:  1 patch   Place 1 patch onto the skin every 24 hours   Quantity:  30 patch   Refills:  3         These medicines have changed or have updated prescriptions.        Dose/Directions    * lisdexamfetamine 60 MG capsule   Commonly known as:  VYVANSE   This may have changed:  Another medication with the same name was added. Make sure you understand how and when to take each.   Used for:  ADHD (attention deficit hyperactivity disorder), combined type   Changed by:  Candelaria Clarke MD        Dose:  60 mg   Take 1 capsule (60 mg) by mouth every morning   Quantity:  30 capsule   Refills:  0       * lisdexamfetamine 60 MG capsule   Commonly known as:  VYVANSE   This may have changed:  You were already taking a medication with the same name, and this prescription was added. Make sure you understand how and when to take each.   Used for:  ADHD (attention deficit hyperactivity disorder), combined type   Changed by:  Candelaria Clarke MD        Dose:  60 mg   Start taking on:  3/28/2018   Take 1 capsule (60 mg) by mouth every morning   Quantity:  30 capsule   Refills:  0       * lisdexamfetamine 60 MG capsule   Commonly known as:  VYVANSE   This may have changed:  You were already taking a medication with  the same name, and this prescription was added. Make sure you understand how and when to take each.   Used for:  ADHD (attention deficit hyperactivity disorder), combined type   Changed by:  Candelaria Clarke MD        Dose:  60 mg   Start taking on:  4/25/2018   Take 1 capsule (60 mg) by mouth every morning   Quantity:  30 capsule   Refills:  0       * Notice:  This list has 3 medication(s) that are the same as other medications prescribed for you. Read the directions carefully, and ask your doctor or other care provider to review them with you.         Where to get your medicines      These medications were sent to [facility] - [place], [place] - [address]  [address] [facility] [place] [address]     Phone:  [phone]     nicotine 21 MG/24HR 24 hr patch         Some of these will need a paper prescription and others can be bought over the counter.  Ask your nurse if you have questions.     Bring a paper prescription for each of these medications     lisdexamfetamine 60 MG capsule    lisdexamfetamine 60 MG capsule    lisdexamfetamine 60 MG capsule                Primary Care Provider Office Phone # Fax #    PRUDENCE Braswell [phone]       [facility] [address]  [place] [address]        Equal Access to Services     BOB NEWMAN AH: Hadii aad ku hadasho Soomaali, waaxda luqadaha, qaybta kaalmada adeegyada, waxay idiin hayaan jordan barrera lamanuela berumen. So [facility] [phone].    ATENCIÓN: Si habla español, tiene a ojeda disposición servicios gratuitos de asistencia lingüística. Llame al [phone].    We comply with applicable federal civil rights laws and [place] laws. We do not discriminate on the basis of race, color, national origin, age, disability, sex, sexual orientation, or gender identity.            Thank you!     Thank you for choosing [facility]  for your care. Our goal is always to provide you with excellent care. Hearing back from our  patients is one way we can continue to improve our services. Please take a few minutes to complete the written survey that you may receive in the mail after your visit with us. Thank you!             Your Updated Medication List - Protect others around you: Learn how to safely use, store and throw away your medicines at www.disposemymeds.org.          This list is accurate as of 2/28/18  5:32 PM.  Always use your most recent med list.                   Brand Name Dispense Instructions for use Diagnosis    * lisdexamfetamine 60 MG capsule    VYVANSE    30 capsule    Take 1 capsule (60 mg) by mouth every morning    ADHD (attention deficit hyperactivity disorder), combined type       * lisdexamfetamine 60 MG capsule   Start taking on:  3/28/2018    VYVANSE    30 capsule    Take 1 capsule (60 mg) by mouth every morning    ADHD (attention deficit hyperactivity disorder), combined type       * lisdexamfetamine 60 MG capsule   Start taking on:  4/25/2018    VYVANSE    30 capsule    Take 1 capsule (60 mg) by mouth every morning    ADHD (attention deficit hyperactivity disorder), combined type       nicotine 21 MG/24HR 24 hr patch    EQL NICOTINE    30 patch    Place 1 patch onto the skin every 24 hours    Tobacco abuse       * Notice:  This list has 3 medication(s) that are the same as other medications prescribed for you. Read the directions carefully, and ask your doctor or other care provider to review them with you.

## 2018-02-28 NOTE — PATIENT INSTRUCTIONS

## 2018-03-01 ASSESSMENT — ANXIETY QUESTIONNAIRES: GAD7 TOTAL SCORE: 0

## 2018-03-01 ASSESSMENT — PATIENT HEALTH QUESTIONNAIRE - PHQ9: SUM OF ALL RESPONSES TO PHQ QUESTIONS 1-9: 0

## 2018-03-09 ENCOUNTER — APPOINTMENT (OUTPATIENT)
Dept: GENERAL RADIOLOGY | Facility: HOSPITAL | Age: 33
End: 2018-03-09
Attending: INTERNAL MEDICINE
Payer: COMMERCIAL

## 2018-03-09 ENCOUNTER — HOSPITAL ENCOUNTER (EMERGENCY)
Facility: HOSPITAL | Age: 33
Discharge: HOME | End: 2018-03-09
Attending: INTERNAL MEDICINE | Admitting: INTERNAL MEDICINE
Payer: COMMERCIAL

## 2018-03-09 VITALS
SYSTOLIC BLOOD PRESSURE: 103 MMHG | TEMPERATURE: 98 F | DIASTOLIC BLOOD PRESSURE: 60 MMHG | OXYGEN SATURATION: 98 % | HEIGHT: 65 IN | RESPIRATION RATE: 16 BRPM | WEIGHT: 155 LBS | BODY MASS INDEX: 25.83 KG/M2 | HEART RATE: 69 BPM

## 2018-03-09 DIAGNOSIS — R07.89 ATYPICAL CHEST PAIN: ICD-10-CM

## 2018-03-09 LAB
ALBUMIN SERPL-MCNC: 4.3 G/DL (ref 3.4–5)
ALP SERPL-CCNC: 65 U/L (ref 40–150)
ALT SERPL W P-5'-P-CCNC: 16 U/L (ref 0–50)
ANION GAP SERPL CALCULATED.3IONS-SCNC: 8 MMOL/L (ref 3–14)
AST SERPL W P-5'-P-CCNC: 11 U/L (ref 0–45)
BASOPHILS # BLD AUTO: 0 10E9/L (ref 0–0.2)
BASOPHILS NFR BLD AUTO: 0.5 %
BILIRUB SERPL-MCNC: 1.1 MG/DL (ref 0.2–1.3)
BUN SERPL-MCNC: 12 MG/DL (ref 7–30)
CALCIUM SERPL-MCNC: 8.9 MG/DL (ref 8.5–10.1)
CHLORIDE SERPL-SCNC: 104 MMOL/L (ref 94–109)
CO2 SERPL-SCNC: 26 MMOL/L (ref 20–32)
CREAT SERPL-MCNC: 0.76 MG/DL (ref 0.52–1.04)
DIFFERENTIAL METHOD BLD: NORMAL
EOSINOPHIL # BLD AUTO: 0.1 10E9/L (ref 0–0.7)
EOSINOPHIL NFR BLD AUTO: 1.4 %
ERYTHROCYTE [DISTWIDTH] IN BLOOD BY AUTOMATED COUNT: 12.8 % (ref 10–15)
GFR SERPL CREATININE-BSD FRML MDRD: 86 ML/MIN/1.7M2
GLUCOSE SERPL-MCNC: 82 MG/DL (ref 70–99)
HCT VFR BLD AUTO: 43.2 % (ref 35–47)
HGB BLD-MCNC: 15 G/DL (ref 11.7–15.7)
IMM GRANULOCYTES # BLD: 0 10E9/L (ref 0–0.4)
IMM GRANULOCYTES NFR BLD: 0.4 %
LIPASE SERPL-CCNC: 72 U/L (ref 73–393)
LYMPHOCYTES # BLD AUTO: 2.1 10E9/L (ref 0.8–5.3)
LYMPHOCYTES NFR BLD AUTO: 28.5 %
MCH RBC QN AUTO: 30.2 PG (ref 26.5–33)
MCHC RBC AUTO-ENTMCNC: 34.7 G/DL (ref 31.5–36.5)
MCV RBC AUTO: 87 FL (ref 78–100)
MONOCYTES # BLD AUTO: 0.6 10E9/L (ref 0–1.3)
MONOCYTES NFR BLD AUTO: 8.4 %
NEUTROPHILS # BLD AUTO: 4.5 10E9/L (ref 1.6–8.3)
NEUTROPHILS NFR BLD AUTO: 60.8 %
NRBC # BLD AUTO: 0 10*3/UL
NRBC BLD AUTO-RTO: 0 /100
PLATELET # BLD AUTO: 352 10E9/L (ref 150–450)
POTASSIUM SERPL-SCNC: 4.6 MMOL/L (ref 3.4–5.3)
PROT SERPL-MCNC: 7.8 G/DL (ref 6.8–8.8)
RBC # BLD AUTO: 4.96 10E12/L (ref 3.8–5.2)
SODIUM SERPL-SCNC: 138 MMOL/L (ref 133–144)
TROPONIN I SERPL-MCNC: <0.015 UG/L (ref 0–0.04)
WBC # BLD AUTO: 7.4 10E9/L (ref 4–11)

## 2018-03-09 PROCEDURE — 85025 COMPLETE CBC W/AUTO DIFF WBC: CPT | Performed by: INTERNAL MEDICINE

## 2018-03-09 PROCEDURE — 36415 COLL VENOUS BLD VENIPUNCTURE: CPT | Performed by: INTERNAL MEDICINE

## 2018-03-09 PROCEDURE — 25000132 ZZH RX MED GY IP 250 OP 250 PS 637: Performed by: INTERNAL MEDICINE

## 2018-03-09 PROCEDURE — 83690 ASSAY OF LIPASE: CPT | Performed by: INTERNAL MEDICINE

## 2018-03-09 PROCEDURE — 96365 THER/PROPH/DIAG IV INF INIT: CPT

## 2018-03-09 PROCEDURE — 25000128 H RX IP 250 OP 636: Performed by: INTERNAL MEDICINE

## 2018-03-09 PROCEDURE — 71046 X-RAY EXAM CHEST 2 VIEWS: CPT | Mod: TC

## 2018-03-09 PROCEDURE — 80053 COMPREHEN METABOLIC PANEL: CPT | Performed by: INTERNAL MEDICINE

## 2018-03-09 PROCEDURE — 96375 TX/PRO/DX INJ NEW DRUG ADDON: CPT

## 2018-03-09 PROCEDURE — 25000128 H RX IP 250 OP 636

## 2018-03-09 PROCEDURE — 84484 ASSAY OF TROPONIN QUANT: CPT | Performed by: INTERNAL MEDICINE

## 2018-03-09 PROCEDURE — 99285 EMERGENCY DEPT VISIT HI MDM: CPT | Mod: 25

## 2018-03-09 PROCEDURE — 99285 EMERGENCY DEPT VISIT HI MDM: CPT | Performed by: INTERNAL MEDICINE

## 2018-03-09 PROCEDURE — 25000125 ZZHC RX 250: Performed by: INTERNAL MEDICINE

## 2018-03-09 PROCEDURE — 93005 ELECTROCARDIOGRAM TRACING: CPT

## 2018-03-09 RX ORDER — ALUMINA, MAGNESIA, AND SIMETHICONE 2400; 2400; 240 MG/30ML; MG/30ML; MG/30ML
30 SUSPENSION ORAL ONCE
Status: COMPLETED | OUTPATIENT
Start: 2018-03-09 | End: 2018-03-09

## 2018-03-09 RX ORDER — LORAZEPAM 2 MG/ML
1 INJECTION INTRAMUSCULAR ONCE
Status: COMPLETED | OUTPATIENT
Start: 2018-03-09 | End: 2018-03-09

## 2018-03-09 RX ORDER — ONDANSETRON 2 MG/ML
4 INJECTION INTRAMUSCULAR; INTRAVENOUS ONCE
Status: COMPLETED | OUTPATIENT
Start: 2018-03-09 | End: 2018-03-09

## 2018-03-09 RX ORDER — ACETAMINOPHEN 325 MG/1
975 TABLET ORAL ONCE
Status: COMPLETED | OUTPATIENT
Start: 2018-03-09 | End: 2018-03-09

## 2018-03-09 RX ORDER — TRAMADOL HYDROCHLORIDE 50 MG/1
50 TABLET ORAL EVERY 8 HOURS PRN
Qty: 3 TABLET | Refills: 0 | Status: SHIPPED | OUTPATIENT
Start: 2018-03-09 | End: 2018-03-10

## 2018-03-09 RX ORDER — LORAZEPAM 1 MG/1
1 TABLET ORAL ONCE
Status: COMPLETED | OUTPATIENT
Start: 2018-03-09 | End: 2018-03-09

## 2018-03-09 RX ORDER — LORAZEPAM 2 MG/ML
INJECTION INTRAMUSCULAR
Status: COMPLETED
Start: 2018-03-09 | End: 2018-03-09

## 2018-03-09 RX ORDER — LORAZEPAM 2 MG/ML
1 INJECTION INTRAMUSCULAR ONCE
Status: DISCONTINUED | OUTPATIENT
Start: 2018-03-09 | End: 2018-03-09

## 2018-03-09 RX ADMIN — LORAZEPAM 1 MG: 2 INJECTION INTRAMUSCULAR at 07:49

## 2018-03-09 RX ADMIN — ACETAMINOPHEN 975 MG: 325 TABLET, FILM COATED ORAL at 07:51

## 2018-03-09 RX ADMIN — FAMOTIDINE 20 MG: 20 INJECTION, SOLUTION INTRAVENOUS at 07:22

## 2018-03-09 RX ADMIN — ONDANSETRON 4 MG: 2 INJECTION INTRAMUSCULAR; INTRAVENOUS at 07:18

## 2018-03-09 RX ADMIN — ALUMINUM HYDROXIDE, MAGNESIUM HYDROXIDE, AND DIMETHICONE 30 ML: 400; 400; 40 SUSPENSION ORAL at 07:16

## 2018-03-09 RX ADMIN — LORAZEPAM 1 MG: 2 INJECTION, SOLUTION INTRAMUSCULAR; INTRAVENOUS at 07:49

## 2018-03-09 RX ADMIN — LORAZEPAM 1 MG: 1 TABLET ORAL at 07:17

## 2018-03-09 ASSESSMENT — ENCOUNTER SYMPTOMS
COUGH: 0
CONFUSION: 0
NERVOUS/ANXIOUS: 1
MYALGIAS: 0
WHEEZING: 0
SHORTNESS OF BREATH: 0
FEVER: 0
CHILLS: 0
NAUSEA: 0
VOMITING: 0
BACK PAIN: 0
DIAPHORESIS: 0
WOUND: 0
HEADACHES: 0
COLOR CHANGE: 0
BLOOD IN STOOL: 0
NUMBNESS: 0
DYSURIA: 0
ARTHRALGIAS: 0
LIGHT-HEADEDNESS: 0
ABDOMINAL PAIN: 0
FLANK PAIN: 0
NECK PAIN: 0
CHEST TIGHTNESS: 0
DIZZINESS: 0
HEMATURIA: 0
VOICE CHANGE: 0
NECK STIFFNESS: 0
PALPITATIONS: 0
ANAL BLEEDING: 0
ABDOMINAL DISTENTION: 0

## 2018-03-09 NOTE — DISCHARGE INSTRUCTIONS
*CHEST PAIN, UNCERTAIN CAUSE    Based on your exam today, the exact cause of your chest pain is not certain. Your condition does not seem serious at this time, and your pain does not appear to be coming from your heart. However, sometimes the signs of a serious problem take more time to appear. Therefore, watch for the warning signs listed below.  HOME CARE:  1. Rest today and avoid strenuous activity.  2. Take any prescribed medicine as directed.  FOLLOW UP with your doctor in 1-3 days.   GET PROMPT MEDICAL ATTENTION if any of the following occur:    A change in the type of pain: if it feels different, becomes more severe, lasts longer, or begins to spread into your shoulder, arm, neck, jaw or back    Shortness of breath or increased pain with breathing    Weakness, dizziness, or fainting    Cough with blood or dark colored sputum (phlegm)    Fever over 101  F (38.3  C)    Swelling, pain or redness in one leg    5225-9883 [employer]. [address] [place] [address]. All rights reserved. This information is not intended as a substitute for professional medical care. Always follow your healthcare professional's instructions.  This information has been modified by your health care provider with permission from the publisher.

## 2018-03-09 NOTE — ED AVS SNAPSHOT
[facility]    [address] [address]    Phone:  [phone]                                       Vesna Fabian   MRN: [id number]    Department:  [facility]   Date of Visit:  3/9/2018           After Visit Summary Signature Page     I have received my discharge instructions, and my questions have been answered. I have discussed any challenges I see with this plan with the nurse or doctor.    ..........................................................................................................................................  Patient/Patient Representative Signature      ..........................................................................................................................................  Patient Representative Print Name and Relationship to Patient    ..................................................               ................................................  Date                                            Time    ..........................................................................................................................................  Reviewed by Signature/Title    ...................................................              ..............................................  Date                                                            Time

## 2018-03-09 NOTE — ED AVS SNAPSHOT
[facility]    [address] [place] [address]    Phone:  [phone]                                       Vesna Fabian   MRN: [id number]    Department:  [facility]   Date of Visit:  3/9/2018           Patient Information     Date Of Birth          1985        Your diagnoses for this visit were:     Atypical chest pain        You were seen by Yang Diallo MD.        Discharge Instructions          *CHEST PAIN, UNCERTAIN CAUSE    Based on your exam today, the exact cause of your chest pain is not certain. Your condition does not seem serious at this time, and your pain does not appear to be coming from your heart. However, sometimes the signs of a serious problem take more time to appear. Therefore, watch for the warning signs listed below.  HOME CARE:  1. Rest today and avoid strenuous activity.  2. Take any prescribed medicine as directed.  FOLLOW UP with your doctor in 1-3 days.   GET PROMPT MEDICAL ATTENTION if any of the following occur:    A change in the type of pain: if it feels different, becomes more severe, lasts longer, or begins to spread into your shoulder, arm, neck, jaw or back    Shortness of breath or increased pain with breathing    Weakness, dizziness, or fainting    Cough with blood or dark colored sputum (phlegm)    Fever over 101  F (38.3  C)    Swelling, pain or redness in one leg    4277-9924 [employer]. [address] [place] [address]. All rights reserved. This information is not intended as a substitute for professional medical care. Always follow your healthcare professional's instructions.  This information has been modified by your health care provider with permission from the publisher.         Review of your medicines      START taking        Dose / Directions Last dose taken    traMADol 50 MG tablet   Commonly known as:  ULTRAM   Dose:  50 mg   Quantity:  3 tablet        Take 1 tablet (50 mg) by mouth every 8  hours as needed for moderate pain   Refills:  0          Our records show that you are taking the medicines listed below. If these are incorrect, please call your family doctor or clinic.        Dose / Directions Last dose taken    * lisdexamfetamine 60 MG capsule   Commonly known as:  VYVANSE   Dose:  60 mg   Quantity:  30 capsule        Take 1 capsule (60 mg) by mouth every morning   Refills:  0        * lisdexamfetamine 60 MG capsule   Commonly known as:  VYVANSE   Dose:  60 mg   Quantity:  30 capsule   Start taking on:  3/28/2018        Take 1 capsule (60 mg) by mouth every morning   Refills:  0        * lisdexamfetamine 60 MG capsule   Commonly known as:  VYVANSE   Dose:  60 mg   Quantity:  30 capsule   Start taking on:  4/25/2018        Take 1 capsule (60 mg) by mouth every morning   Refills:  0        * Notice:  This list has 3 medication(s) that are the same as other medications prescribed for you. Read the directions carefully, and ask your doctor or other care provider to review them with you.            Prescriptions were sent or printed at these locations (1 Prescription)                   Other Prescriptions                Printed at Department/Unit printer (1 of 1)         traMADol (ULTRAM) 50 MG tablet                Procedures and tests performed during your visit     CBC with platelets differential    Comprehensive metabolic panel    Lipase    Troponin I    XR Chest 2 Views      Orders Needing Specimen Collection     None      Pending Results     Date and Time Order Name Status Description    3/9/2018 0741 XR Chest 2 Views In process             Pending Culture Results     No orders found from 3/7/2018 to 3/10/2018.            Thank you for choosing [facility]       Thank you for choosing [facility] for your care. Our goal is always to provide you with excellent care. Hearing back from our patients is one way we can continue to improve our services. Please take a few minutes to complete the written  "survey that you may receive in the mail after you visit with us. Thank you!        [employer] Information     [employer] lets you send messages to your doctor, view your test results, renew your prescriptions, schedule appointments and more. To sign up, go to [email or web address] . Click on \"Log in\" on the left side of the screen, which will take you to the Welcome page. Then click on \"Sign up Now\" on the right side of the page.     You will be asked to enter the access code listed below, as well as some personal information. Please follow the directions to create your username and password.     Your access code is: [email or web address]  Expires: 2018  5:32 PM     Your access code will  in 90 days. If you need help or a new code, please call your [facility] clinic or [phone].        Care EveryWhere ID     This is your Care EveryWhere ID. This could be used by other organizations to access your [facility] medical records  [id number]        Equal Access to Services     NIMCO NEWMAN : Hadreagan phamo Sotiburcio, waaxda luqadaha, qaybta kaalmada adrián, lauri vázquez . So [facility] [phone].    ATENCIÓN: Si habla español, tiene a ojeda disposición servicios gratuitos de asistencia lingüística. Llame al [phone].    We comply with applicable federal civil rights laws and [place] laws. We do not discriminate on the basis of race, color, national origin, age, disability, sex, sexual orientation, or gender identity.            After Visit Summary       This is your record. Keep this with you and show to your community pharmacist(s) and doctor(s) at your next visit.                  "

## 2018-03-09 NOTE — ED NOTES
Condition stable no distress.  Understands discharge instructions.  Prescription x 1 given,  Discharged home.

## 2018-03-09 NOTE — ED NOTES
Pt presents to ED with c/o sudden onset midsternal chest pain. Started at 0100 today, woke pt from sleep.  C/o nausea and SOB.  States she feels she cannot take a full breath.  Pt is anxious and says she tried some breathing exercises at home but they did not help with pain or SOB.  Pt describes pain as sharp.

## 2018-03-09 NOTE — ED PROVIDER NOTES
History     Chief Complaint   Patient presents with     Chest Pain     sudden onset at 0100, sharp midsternal chest pain     Patient is a 32 year old female presenting with chest pain. The history is provided by the patient.   Chest Pain   Pain location:  R chest and substernal area  Pain quality: aching and sharp    Pain radiates to:  R shoulder and L shoulder  Onset quality:  Sudden  Duration:  1 hour  Chronicity:  New  Associated symptoms: no abdominal pain, no back pain, no cough, no diaphoresis, no dizziness, no fever, no headache, no nausea, no numbness, no palpitations, no shortness of breath and no vomiting        Problem List:    Patient Active Problem List    Diagnosis Date Noted     ACP (advance care planning) 03/10/2016     Priority: Medium     Advance Care Planning 3/10/2016: ACP Review of Chart / Resources Provided:  Reviewed chart for advance care plan.  Vesna Fabian has been provided information and resources to begin or update their advance care plan.  Added by Kenisha Chille             NO SHOW 06/18/2015     Priority: Medium     No shows for Dr. Valdez : 5/15/13; 4/29/15         ASCUS (atypical squamous cells of undetermined significance) on Pap smear 04/29/2014     Priority: Medium     Encounter for routine gynecological examination 04/21/2014     Priority: Medium     Problem list name updated by automated process. Provider to review       Decreased libido 04/21/2014     Priority: Medium     Sensate focus       Fatigue 04/21/2014     Priority: Medium     Smoker 04/21/2014     Priority: Medium     Ready to quit. Only 3 cigs in a week       Family planning 04/22/2013     Priority: Medium     Moderate major depression (H) 04/22/2013     Priority: Medium     Denies suicidal or homicidal ideations at this time. Working with RAÚL Molina       Anxiety 04/22/2013     Priority: Medium     Chronic fatigue 04/22/2013     Priority: Medium        Past Medical History:    Past Medical History:   Diagnosis  Date     Attention deficit disorder without mention of hyperactivity 5/31/2000     Bipolar affective disorder 8/9/2012     Bulimia 8/9/2012     Self-injurious behavior 8/9/2012     Suicide attempt 8/9/2012       Past Surgical History:    Past Surgical History:   Procedure Laterality Date     cyst ovary      LT     MAMMOPLASTY REDUCTION  2011     wisdom teeth removed         Family History:    Family History   Problem Relation Age of Onset     CANCER Maternal Grandmother      pancreatic cancer; cause of death     CANCER Father      rectal     Colon Cancer Father      HEART DISEASE Paternal Grandmother      heart disease     Hypertension Mother        Social History:  Marital Status:  Single [1]  Social History   Substance Use Topics     Smoking status: Current Every Day Smoker     Years: 10.00     Types: Cigarettes     Last attempt to quit: 9/12/2013     Smokeless tobacco: Never Used      Comment: quit 2011. longest tobacco free 1 year. no passive exposure     Alcohol use Yes      Comment: rarely        Medications:      traMADol (ULTRAM) 50 MG tablet   lisdexamfetamine (VYVANSE) 60 MG capsule   [START ON 3/28/2018] lisdexamfetamine (VYVANSE) 60 MG capsule   [START ON 4/25/2018] lisdexamfetamine (VYVANSE) 60 MG capsule         Review of Systems   Constitutional: Negative for chills, diaphoresis and fever.   HENT: Negative for voice change.    Eyes: Negative for visual disturbance.   Respiratory: Negative for cough, chest tightness, shortness of breath and wheezing.    Cardiovascular: Positive for chest pain. Negative for palpitations and leg swelling.   Gastrointestinal: Negative for abdominal distention, abdominal pain, anal bleeding, blood in stool, nausea and vomiting.   Genitourinary: Negative for decreased urine volume, dysuria, flank pain and hematuria.   Musculoskeletal: Negative for arthralgias, back pain, gait problem, myalgias, neck pain and neck stiffness.   Skin: Negative for color change, pallor, rash  "and wound.   Neurological: Negative for dizziness, syncope, light-headedness, numbness and headaches.   Psychiatric/Behavioral: Negative for confusion and suicidal ideas. The patient is nervous/anxious.        Physical Exam   BP: 115/63  Pulse: 69  Heart Rate: 69  Temp: 97.1  F (36.2  C)  Resp: 20  Height: 165.1 cm (5' 5\")  Weight: 70.3 kg (155 lb)  SpO2: 100 %      Physical Exam   Constitutional: She is oriented to person, place, and time. She appears well-developed and well-nourished.   HENT:   Head: Normocephalic and atraumatic.   Mouth/Throat: No oropharyngeal exudate.   Eyes: Conjunctivae are normal. Pupils are equal, round, and reactive to light.   Neck: Normal range of motion. Neck supple. No JVD present. No tracheal deviation present. No thyromegaly present.   Cardiovascular: Normal rate, regular rhythm, normal heart sounds and intact distal pulses.  Exam reveals no gallop and no friction rub.    No murmur heard.  Pulmonary/Chest: Effort normal and breath sounds normal. No stridor. No respiratory distress. She has no wheezes. She has no rales. She exhibits no tenderness.   Abdominal: Soft. Bowel sounds are normal. She exhibits no distension and no mass. There is no tenderness. There is no rebound and no guarding.   Musculoskeletal: Normal range of motion. She exhibits no edema or tenderness.   Lymphadenopathy:     She has no cervical adenopathy.   Neurological: She is alert and oriented to person, place, and time.   Skin: Skin is warm and dry. No rash noted. No erythema. No pallor.   Psychiatric: Her behavior is normal.   Nursing note and vitals reviewed.      ED Course     ED Course     Procedures                 Results for orders placed or performed during the hospital encounter of 03/09/18 (from the past 24 hour(s))   CBC with platelets differential   Result Value Ref Range    WBC 7.4 4.0 - 11.0 10e9/L    RBC Count 4.96 3.8 - 5.2 10e12/L    Hemoglobin 15.0 11.7 - 15.7 g/dL    Hematocrit 43.2 35.0 - 47.0 " %    MCV 87 78 - 100 fl    MCH 30.2 26.5 - 33.0 pg    MCHC 34.7 31.5 - 36.5 g/dL    RDW 12.8 10.0 - 15.0 %    Platelet Count 352 150 - 450 10e9/L    Diff Method Automated Method     % Neutrophils 60.8 %    % Lymphocytes 28.5 %    % Monocytes 8.4 %    % Eosinophils 1.4 %    % Basophils 0.5 %    % Immature Granulocytes 0.4 %    Nucleated RBCs 0 0 /100    Absolute Neutrophil 4.5 1.6 - 8.3 10e9/L    Absolute Lymphocytes 2.1 0.8 - 5.3 10e9/L    Absolute Monocytes 0.6 0.0 - 1.3 10e9/L    Absolute Eosinophils 0.1 0.0 - 0.7 10e9/L    Absolute Basophils 0.0 0.0 - 0.2 10e9/L    Abs Immature Granulocytes 0.0 0 - 0.4 10e9/L    Absolute Nucleated RBC 0.0    Comprehensive metabolic panel   Result Value Ref Range    Sodium 138 133 - 144 mmol/L    Potassium 4.6 3.4 - 5.3 mmol/L    Chloride 104 94 - 109 mmol/L    Carbon Dioxide 26 20 - 32 mmol/L    Anion Gap 8 3 - 14 mmol/L    Glucose 82 70 - 99 mg/dL    Urea Nitrogen 12 7 - 30 mg/dL    Creatinine 0.76 0.52 - 1.04 mg/dL    GFR Estimate 86 >60 mL/min/1.7m2    GFR Estimate If Black >90 >60 mL/min/1.7m2    Calcium 8.9 8.5 - 10.1 mg/dL    Bilirubin Total 1.1 0.2 - 1.3 mg/dL    Albumin 4.3 3.4 - 5.0 g/dL    Protein Total 7.8 6.8 - 8.8 g/dL    Alkaline Phosphatase 65 40 - 150 U/L    ALT 16 0 - 50 U/L    AST 11 0 - 45 U/L   Lipase   Result Value Ref Range    Lipase 72 (L) 73 - 393 U/L   Troponin I   Result Value Ref Range    Troponin I ES <0.015 0.000 - 0.045 ug/L   XR Chest 2 Views    Narrative    PROCEDURE:  XR CHEST 2 VW    HISTORY:  chest pain; .     COMPARISON:  2009      FINDINGS:   The cardiac silhouette is normal in size. The pulmonary vasculature is  normal.  The lungs are clear. No pleural effusion or pneumothorax.      Impression    IMPRESSION:  No acute cardiopulmonary disease.      ANIKET SUSLAVICH, MD       Assessments & Plan (with Medical Decision Making)   Atypical chest pain, anxiety  EKG: NSR  CXR: negative  Lab:WNL  Uncertain etiology, may related to anxiety,  I advised  her outpatient follow up with further evaluation and if symptoms persisted return to ER  She understood and agreed  I have reviewed the nursing notes.    I have reviewed the findings, diagnosis, plan and need for follow up with the patient.      Discharge Medication List as of 3/9/2018  8:11 AM      START taking these medications    Details   traMADol (ULTRAM) 50 MG tablet Take 1 tablet (50 mg) by mouth every 8 hours as needed for moderate pain, Disp-3 tablet, R-0, Local Print             Final diagnoses:   Atypical chest pain       3/9/2018   [facility]     Yang Diallo MD  03/09/18 2051

## 2018-06-04 DIAGNOSIS — F90.2 ADHD (ATTENTION DEFICIT HYPERACTIVITY DISORDER), COMBINED TYPE: Primary | ICD-10-CM

## 2018-06-04 NOTE — TELEPHONE ENCOUNTER
Vyvanse  Last Written Prescription Date: 4/25/18  Last Fill Quantity: 30 # of Refills: 0  Last Office Visit: 2/28/18

## 2018-06-06 RX ORDER — LISDEXAMFETAMINE DIMESYLATE 60 MG/1
CAPSULE ORAL
Qty: 30 CAPSULE | Refills: 0 | Status: SHIPPED | OUTPATIENT
Start: 2018-06-06 | End: 2018-10-30

## 2018-06-06 NOTE — TELEPHONE ENCOUNTER
I filled Vyvanse for her. CJ when you have time can you please bring this up to [facility]? Thank you!

## 2018-10-30 ENCOUNTER — OFFICE VISIT (OUTPATIENT)
Dept: PSYCHIATRY | Facility: OTHER | Age: 33
End: 2018-10-30
Attending: PSYCHIATRY & NEUROLOGY
Payer: COMMERCIAL

## 2018-10-30 VITALS — HEART RATE: 88 BPM | SYSTOLIC BLOOD PRESSURE: 126 MMHG | TEMPERATURE: 98.5 F | DIASTOLIC BLOOD PRESSURE: 86 MMHG

## 2018-10-30 DIAGNOSIS — F90.2 ADHD (ATTENTION DEFICIT HYPERACTIVITY DISORDER), COMBINED TYPE: ICD-10-CM

## 2018-10-30 PROCEDURE — 99213 OFFICE O/P EST LOW 20 MIN: CPT | Performed by: PSYCHIATRY & NEUROLOGY

## 2018-10-30 RX ORDER — LISDEXAMFETAMINE DIMESYLATE 60 MG/1
60 CAPSULE ORAL EVERY MORNING
Qty: 30 CAPSULE | Refills: 0 | Status: SHIPPED | OUTPATIENT
Start: 2018-11-28 | End: 2019-10-22

## 2018-10-30 RX ORDER — LISDEXAMFETAMINE DIMESYLATE 60 MG/1
60 CAPSULE ORAL EVERY MORNING
Qty: 30 CAPSULE | Refills: 0 | Status: SHIPPED | OUTPATIENT
Start: 2018-12-27 | End: 2020-01-22

## 2018-10-30 RX ORDER — LISDEXAMFETAMINE DIMESYLATE 60 MG/1
60 CAPSULE ORAL EVERY MORNING
Qty: 30 CAPSULE | Refills: 0 | Status: SHIPPED | OUTPATIENT
Start: 2018-10-30 | End: 2019-10-22

## 2018-10-30 ASSESSMENT — PATIENT HEALTH QUESTIONNAIRE - PHQ9
5. POOR APPETITE OR OVEREATING: SEVERAL DAYS
SUM OF ALL RESPONSES TO PHQ QUESTIONS 1-9: 25

## 2018-10-30 ASSESSMENT — ANXIETY QUESTIONNAIRES
5. BEING SO RESTLESS THAT IT IS HARD TO SIT STILL: NOT AT ALL
GAD7 TOTAL SCORE: 10
6. BECOMING EASILY ANNOYED OR IRRITABLE: MORE THAN HALF THE DAYS
7. FEELING AFRAID AS IF SOMETHING AWFUL MIGHT HAPPEN: MORE THAN HALF THE DAYS
IF YOU CHECKED OFF ANY PROBLEMS ON THIS QUESTIONNAIRE, HOW DIFFICULT HAVE THESE PROBLEMS MADE IT FOR YOU TO DO YOUR WORK, TAKE CARE OF THINGS AT HOME, OR GET ALONG WITH OTHER PEOPLE: SOMEWHAT DIFFICULT
2. NOT BEING ABLE TO STOP OR CONTROL WORRYING: MORE THAN HALF THE DAYS
3. WORRYING TOO MUCH ABOUT DIFFERENT THINGS: MORE THAN HALF THE DAYS
1. FEELING NERVOUS, ANXIOUS, OR ON EDGE: SEVERAL DAYS

## 2018-10-30 ASSESSMENT — PAIN SCALES - GENERAL: PAINLEVEL: NO PAIN (0)

## 2018-10-30 NOTE — PROGRESS NOTES
"  PSYCHIATRY CLINIC PROGRESS NOTE   20 minute medication management, more than 50% of time spent counseling patient on medications, medication side effects, symptom history and management   SUBJECTIVE / INTERIM HISTORY                                                                        Social- with parents and Rafael Children- Rafael is 13 yo daughter     Last visit February 2018: Continue vyanse 60 mg daily and last script 1/26/18. Gave scripts today 2/28/18, 3/28/18, and 4/25/18    - vyvanse $300 - been off since February and thought go without but life is not going well. To point of feeling depressed , anxious  - life / work balance is off  - was off Vyvanse for awhile in past  - couldn't focus, wasn't able to get her work done at [employer]. \"my brain won't shut off\"  - moved in with parents try help     SUBSTANCE USE- reports no issues    SYMPTOMS-   SIB: Burning (last was in Oct '15) , Overwhelming, irritability, SIB  were becoming more of an issue again so she started working with therapist Hugh Uribe.  Irritability gotten better and moods bit better. Stress with uncertainly, lack of control  MEDICAL ROS-  Fatigue, headahces, constipation  MEDICAL / SURGICAL HISTORY                pregnant [if applicable]--no     Patient Active Problem List   Diagnosis     Family planning     Moderate major depression (H)     Anxiety     Chronic fatigue     Encounter for routine gynecological examination     Decreased libido     Fatigue     Smoker     ASCUS (atypical squamous cells of undetermined significance) on Pap smear     NO SHOW     ACP (advance care planning)     ALLERGY   Dust mites  MEDICATIONS                                                                                             Current Outpatient Prescriptions   Medication Sig     lisdexamfetamine (VYVANSE) 60 MG capsule Take 1 capsule (60 mg) by mouth every morning     No current facility-administered medications for this visit.        VITALS   /86 (BP " Location: Right arm, Patient Position: Sitting, Cuff Size: Adult Regular)  Pulse 88  Temp 98.5  F (36.9  C) (Tympanic)     PHQ9                     [unfilled]  LABS                                                                                                                           TSH   Date Value Ref Range Status   04/21/2014 1.26 0.34 - 4.82 mU/L Final   ]    CBC RESULTS:   Recent Labs   Lab Test  04/21/14   1113   WBC  7.9   RBC  5.06   HGB  14.9   HCT  43.6   MCV  86   MCH  29.4   MCHC  34.2   RDW  13.2   PLT  380       MENTAL STATUS EXAM                                                                                        Alert. Oriented to person, place, and date / time. Well groomed, calm, cooperative with good eye contact. No problems with speech or psychomotor behavior. Mood was described as stressed and affect was congruent to speech content and full range. Thought process, including associations, was unremarkable and thought content was devoid of suicidal and homicidal ideation and psychotic thought. No hallucinations. Insight was good. Judgment was intact and adequate for safety. Fund of knowledge was intact. Pt demonstrates no obvious problems with attention, concentration, language, recent or remote memory although these were not formally tested.     ASSESSMENT                                                                                                      HISTORICAL:  Initial psych note 4/15/16         NOTES:   Notes: Luvox sedation     Vesna Fabian is a 34 yo with psychiatric hx of : depression, anxiety, ADHD, one hospital stay here at  around 2013. Was working with Hugh Uribe for psychotherapy several months on some trauma issues that happened and she has never discussed with others. Initial vist she ntoed Luvox didn't go well thus we agreed on d/c. We will restart vyvanse. She tried go without past several months and led her down a path to anxiety, irritability, depression.        TREATMENT RISK STATEMENT:  The risks, benefits, alternatives and potential adverse effects have been explained and are understood by the pt.  The pt agrees to the treatment plan with the ability to do so.   The pt knows to call the clinic for any problems or access emergency care if needed.        DIAGNOSES                    MDD, recurrent, mod  ADHD       PLAN                                                                                                                    1)  MEDICATIONS:         -- Continue vyanse 60 mg daily and last script 10/30/18, 11/28/18, 12/27/18  2)  THERAPY:  No Change. Working with Hugh Uribe    3)  LABS:  None    4)  PT MONITOR [call for probs]:  Worsening symptoms, SI/HI, SEs from meds    5)  REFERRALS [CD, medical, other]:  None    6)  RTC:    ~3 months

## 2018-10-30 NOTE — NURSING NOTE
"Chief Complaint   Patient presents with     RECHECK     Mental health.       Initial /86 (BP Location: Right arm, Patient Position: Sitting, Cuff Size: Adult Regular)  Pulse 88  Temp 98.5  F (36.9  C) (Tympanic) Estimated body mass index is 25.79 kg/(m^2) as calculated from the following:    Height as of 3/9/18: 1.651 m (5' 5\").    Weight as of 3/9/18: 70.3 kg (155 lb).  Medication Reconciliation: complete    SHEYLA BECERRA LPN    "

## 2018-10-30 NOTE — MR AVS SNAPSHOT
After Visit Summary   10/30/2018    Vesna Fabian    MRN: [id number]           Patient Information     Date Of Birth          1985        Visit Information        Provider Department      10/30/2018 9:40 AM Candelaria Clarke MD [facility]        Today's Diagnoses     ADHD (attention deficit hyperactivity disorder), combined type           Follow-ups after your visit        Your next 10 appointments already scheduled     Jan 30, 2019 10:00 AM CST   (Arrive by 9:45 AM)   Return Visit with Candelaria Clarke MD   [facility]    [address] [place] [address]  [place] [address]   [phone]              Who to contact     If you have questions or need follow up information about today's clinic visit or your schedule please contact [facility] directly at [phone].  Normal or non-critical lab and imaging results will be communicated to you by MyChart, letter or phone within 4 business days after the clinic has received the results. If you do not hear from us within 7 days, please contact the clinic through MyChart or phone. If you have a critical or abnormal lab result, we will notify you by phone as soon as possible.  Submit refill requests through [employer] or call your pharmacy and they will forward the refill request to us. Please allow 3 business days for your refill to be completed.          Additional Information About Your Visit        Care EveryWhere ID     This is your Care EveryWhere ID. This could be used by other organizations to access your [facility] medical records  [id number]        Your Vitals Were     Pulse Temperature                88 98.5  F (36.9  C) (Tympanic)           Blood Pressure from Last 3 Encounters:   10/30/18 126/86   03/09/18 103/60   02/28/18 114/88    Weight from Last 3 Encounters:   03/09/18 70.3 kg (155 lb)   02/28/18 66.2 kg (146 lb)   11/15/17 66.2 kg (146 lb)               Today, you had the following     No orders found for display         Today's Medication Changes          These changes are accurate as of 10/30/18 10:11 AM.  If you have any questions, ask your nurse or doctor.               These medicines have changed or have updated prescriptions.        Dose/Directions    * lisdexamfetamine 60 MG capsule   Commonly known as:  VYVANSE   This may have changed:  See the new instructions.   Used for:  ADHD (attention deficit hyperactivity disorder), combined type   Changed by:  Candelaria Clarke MD        Dose:  60 mg   Take 1 capsule (60 mg) by mouth every morning   Quantity:  30 capsule   Refills:  0       * lisdexamfetamine 60 MG capsule   Commonly known as:  VYVANSE   This may have changed:  You were already taking a medication with the same name, and this prescription was added. Make sure you understand how and when to take each.   Used for:  ADHD (attention deficit hyperactivity disorder), combined type   Changed by:  Candelaria Clarke MD        Dose:  60 mg   Start taking on:  11/28/2018   Take 1 capsule (60 mg) by mouth every morning   Quantity:  30 capsule   Refills:  0       * lisdexamfetamine 60 MG capsule   Commonly known as:  VYVANSE   This may have changed:  You were already taking a medication with the same name, and this prescription was added. Make sure you understand how and when to take each.   Used for:  ADHD (attention deficit hyperactivity disorder), combined type   Changed by:  Candelaria Clarke MD        Dose:  60 mg   Start taking on:  12/27/2018   Take 1 capsule (60 mg) by mouth every morning   Quantity:  30 capsule   Refills:  0       * Notice:  This list has 3 medication(s) that are the same as other medications prescribed for you. Read the directions carefully, and ask your doctor or other care provider to review them with you.         Where to get your medicines      Some of these will need a paper prescription and others can be bought over  the counter.  Ask your nurse if you have questions.     Bring a paper prescription for each of these medications     lisdexamfetamine 60 MG capsule    lisdexamfetamine 60 MG capsule    lisdexamfetamine 60 MG capsule                Primary Care Provider Office Phone # Fax #    Dixie PRUDENCE Sahni [phone]       [address] [place] [address]        Equal Access to Services     NIMCO NEWMAN : Hadii aad ku hadasho Soomaali, waaxda luqadaha, qaybta kaalmada adeegyada, waxay idiin hayaan adeeg kharash la'aan ah. So [facility] [phone].    ATENCIÓN: Si habla español, tiene a ojeda disposición servicios gratuitos de asistencia lingüística. Llame al [phone].    We comply with applicable federal civil rights laws and [place] laws. We do not discriminate on the basis of race, color, national origin, age, disability, sex, sexual orientation, or gender identity.            Thank you!     Thank you for choosing [facility]  for your care. Our goal is always to provide you with excellent care. Hearing back from our patients is one way we can continue to improve our services. Please take a few minutes to complete the written survey that you may receive in the mail after your visit with us. Thank you!             Your Updated Medication List - Protect others around you: Learn how to safely use, store and throw away your medicines at www.disposemymeds.org.          This list is accurate as of 10/30/18 10:11 AM.  Always use your most recent med list.                   Brand Name Dispense Instructions for use Diagnosis    * lisdexamfetamine 60 MG capsule    VYVANSE    30 capsule    Take 1 capsule (60 mg) by mouth every morning    ADHD (attention deficit hyperactivity disorder), combined type       * lisdexamfetamine 60 MG capsule   Start taking on:  11/28/2018    VYVANSE    30 capsule    Take 1 capsule (60 mg) by mouth every morning    ADHD (attention deficit hyperactivity  disorder), combined type       * lisdexamfetamine 60 MG capsule   Start taking on:  12/27/2018    VYVANSE    30 capsule    Take 1 capsule (60 mg) by mouth every morning    ADHD (attention deficit hyperactivity disorder), combined type       * Notice:  This list has 3 medication(s) that are the same as other medications prescribed for you. Read the directions carefully, and ask your doctor or other care provider to review them with you.

## 2018-10-31 ASSESSMENT — ANXIETY QUESTIONNAIRES: GAD7 TOTAL SCORE: 10

## 2018-11-27 ENCOUNTER — HEALTH MAINTENANCE LETTER (OUTPATIENT)
Age: 33
End: 2018-11-27

## 2019-01-04 ENCOUNTER — HOSPITAL ENCOUNTER (EMERGENCY)
Facility: HOSPITAL | Age: 34
Discharge: HOME | End: 2019-01-04
Attending: PHYSICIAN ASSISTANT | Admitting: PHYSICIAN ASSISTANT
Payer: COMMERCIAL

## 2019-01-04 ENCOUNTER — APPOINTMENT (OUTPATIENT)
Dept: GENERAL RADIOLOGY | Facility: HOSPITAL | Age: 34
End: 2019-01-04
Payer: COMMERCIAL

## 2019-01-04 VITALS
TEMPERATURE: 96 F | WEIGHT: 150 LBS | OXYGEN SATURATION: 94 % | BODY MASS INDEX: 24.99 KG/M2 | DIASTOLIC BLOOD PRESSURE: 84 MMHG | SYSTOLIC BLOOD PRESSURE: 136 MMHG | RESPIRATION RATE: 12 BRPM | HEIGHT: 65 IN | HEART RATE: 114 BPM

## 2019-01-04 DIAGNOSIS — L03.032 CELLULITIS OF TOE OF LEFT FOOT: ICD-10-CM

## 2019-01-04 PROCEDURE — G0463 HOSPITAL OUTPT CLINIC VISIT: HCPCS

## 2019-01-04 PROCEDURE — 99213 OFFICE O/P EST LOW 20 MIN: CPT | Performed by: PHYSICIAN ASSISTANT

## 2019-01-04 PROCEDURE — 73630 X-RAY EXAM OF FOOT: CPT | Mod: TC,LT

## 2019-01-04 RX ORDER — SULFAMETHOXAZOLE/TRIMETHOPRIM 800-160 MG
1 TABLET ORAL 2 TIMES DAILY
Qty: 20 TABLET | Refills: 0 | Status: SHIPPED | OUTPATIENT
Start: 2019-01-04 | End: 2019-01-14

## 2019-01-04 ASSESSMENT — MIFFLIN-ST. JEOR: SCORE: 1376.28

## 2019-01-04 ASSESSMENT — ENCOUNTER SYMPTOMS
COLOR CHANGE: 1
ACTIVITY CHANGE: 1
FEVER: 0
APPETITE CHANGE: 0
CHILLS: 0
ARTHRALGIAS: 1
ENDOCRINE NEGATIVE: 1
ROS SKIN COMMENTS: ERYTHEMA

## 2019-01-04 NOTE — LETTER
[facility]  [address]  [place] [address]  Phone: [phone]    January 4, 2019        Vesna Fabian  [address]  [place] [address]          To whom it may concern:    RE: Vesna Fabian was seen in the emergency room and should not return to work for 1-3 days, or until seen by her primary physician.       Please contact me for questions or concerns.      Sincerely,        Vitaliy Stephens PA-C

## 2019-01-04 NOTE — ED PROVIDER NOTES
History     Chief Complaint   Patient presents with     Foot Pain     Left foot injury 2 weeks ago with continued pain     The history is provided by the patient. No  was used.     Vesna Fabian is a 33 year old female who presents after a large drill falling on her left little toe, about 2 weeks ago.  She has been icing it, deena taping it, and elevating it when she gets a chance, but it has gotten worse over the last couple days, with increasing redness and pain.  She denies any fevers or chills.      Problem List:    Patient Active Problem List    Diagnosis Date Noted     ACP (advance care planning) 03/10/2016     Priority: Medium     Advance Care Planning 3/10/2016: ACP Review of Chart / Resources Provided:  Reviewed chart for advance care plan.  Vesna Fabian has been provided information and resources to begin or update their advance care plan.  Added by Kenisha Chilel             NO SHOW 06/18/2015     Priority: Medium     No shows for Dr. Valdez : 5/15/13; 4/29/15         ASCUS (atypical squamous cells of undetermined significance) on Pap smear 04/29/2014     Priority: Medium     Encounter for routine gynecological examination 04/21/2014     Priority: Medium     Problem list name updated by automated process. Provider to review       Decreased libido 04/21/2014     Priority: Medium     Sensate focus       Fatigue 04/21/2014     Priority: Medium     Smoker 04/21/2014     Priority: Medium     Ready to quit. Only 3 cigs in a week       Family planning 04/22/2013     Priority: Medium     Moderate major depression (H) 04/22/2013     Priority: Medium     Denies suicidal or homicidal ideations at this time. Working with RAÚL Molina       Anxiety 04/22/2013     Priority: Medium     Chronic fatigue 04/22/2013     Priority: Medium        Past Medical History:    Past Medical History:   Diagnosis Date     Attention deficit disorder without mention of hyperactivity 5/31/2000     Bipolar  "affective disorder 2012     Bulimia 2012     Self-injurious behavior 2012     Suicide attempt 2012       Past Surgical History:    Past Surgical History:   Procedure Laterality Date     cyst ovary      LT     MAMMOPLASTY REDUCTION       wisdom teeth removed         Family History:    Family History   Problem Relation Age of Onset     Cancer Maternal Grandmother         pancreatic cancer; cause of death     Cancer Father         rectal     Colon Cancer Father      Heart Disease Paternal Grandmother         heart disease     Hypertension Mother        Social History:  Marital Status:  Single [1]  Social History     Tobacco Use     Smoking status: Current Every Day Smoker     Years: 10.00     Types: Cigarettes     Last attempt to quit: 2013     Years since quittin.3     Smokeless tobacco: Never Used     Tobacco comment: quit . longest tobacco free 1 year. no passive exposure   Substance Use Topics     Alcohol use: Yes     Comment: rarely     Drug use: No        Medications:      lisdexamfetamine (VYVANSE) 60 MG capsule   lisdexamfetamine (VYVANSE) 60 MG capsule   lisdexamfetamine (VYVANSE) 60 MG capsule   sulfamethoxazole-trimethoprim (BACTRIM DS) 800-160 MG tablet     Review of Systems   Constitutional: Positive for activity change. Negative for appetite change, chills and fever.   Endocrine: Negative.    Genitourinary: Negative.    Musculoskeletal: Positive for arthralgias.        Left small toe pain, erythema   Skin: Positive for color change.        erythema     Physical Exam   BP: 136/84  Pulse: 114  Temp: 96  F (35.6  C)  Resp: 12  Height: 165.1 cm (5' 5\")  Weight: 68 kg (150 lb)  SpO2: 94 %      Physical Exam   Constitutional: She appears well-developed and well-nourished. No distress.   Musculoskeletal:   Edematous and tender left small toe, with erythema.  Not considerably warm to touch currently, erythema is contained to the small toe, no spreading more proximally.  " Ecchymosis.     Skin: Skin is warm. She is not diaphoretic. There is erythema.       ED Course           Results for orders placed or performed during the hospital encounter of 01/04/19 (from the past 24 hour(s))   Foot XR, G/E 3 views, left    Narrative    XR FOOT LT G/E 3 VW    HISTORY: 33 years Female injury 2 weeks ago    COMPARISON: None    TECHNIQUE: 3 views left foot    FINDINGS: Joint spaces are congruent. Articular surfaces are smooth.  There is no evidence of fracture or dislocation.      Impression    IMPRESSION: No evidence of fracture or dislocation of the left foot.    MICHELLE HEADLEY MD       Medications - No data to display    Assessments & Plan (with Medical Decision Making)     I have reviewed the nursing notes.    I have reviewed the findings, diagnosis, plan and need for follow up with the patient.       Medication List      Started    sulfamethoxazole-trimethoprim 800-160 MG tablet  Commonly known as:  BACTRIM DS  1 tablet, Oral, 2 TIMES DAILY            Final diagnoses:   Cellulitis of toe of left foot   32 yo female, skin/soft tissue infection  -Xray negative for fracture  -will start Bactrim 800-160 BID x 10 days for cellulitis  -discussed need to return immediately if erythema starts to spread  -use post-op shoe for comfort as well    1/4/2019   [facility]     Vitaliy Stephens PA-C  01/04/19 1013       Vitaliy Stephens PA-C  01/04/19 1015

## 2019-01-04 NOTE — ED AVS SNAPSHOT
[facility]  [address] [address]  Phone:  [phone]                                    Vesna Fabian   MRN: [id number]    Department:  [facility]   Date of Visit:  1/4/2019           After Visit Summary Signature Page    I have received my discharge instructions, and my questions have been answered. I have discussed any challenges I see with this plan with the nurse or doctor.    ..........................................................................................................................................  Patient/Patient Representative Signature      ..........................................................................................................................................  Patient Representative Print Name and Relationship to Patient    ..................................................               ................................................  Date                                   Time    ..........................................................................................................................................  Reviewed by Signature/Title    ...................................................              ..............................................  Date                                               Time          22EPIC Rev 08/18

## 2019-01-04 NOTE — ED TRIAGE NOTES
Pt here alone. She states that 2 weeks ago she dropped a 200lb drill on her left foot. States bruising and pain to pinky toe. She has been icing and deena taping area.

## 2019-01-07 ENCOUNTER — TELEPHONE (OUTPATIENT)
Dept: FAMILY MEDICINE | Facility: OTHER | Age: 34
End: 2019-01-07

## 2019-01-07 NOTE — TELEPHONE ENCOUNTER
1:49 PM    Reason for Call: OVERBOOK    Patient is having the following symptoms: Urgent care follow up infected toe      The patient is requesting an appointment for  ASAP  With Dixie Chun    Was an appointment offered for this call?   No    Preferred method for responding to this message: [phone]    If we cannot reach you directly, may we leave a detailed response at the number you provided?  Yes        Lily Drake

## 2019-01-09 ENCOUNTER — TELEPHONE (OUTPATIENT)
Dept: FAMILY MEDICINE | Facility: OTHER | Age: 34
End: 2019-01-09

## 2019-01-09 NOTE — TELEPHONE ENCOUNTER
11:46 AM    Reason for Call: OVERBOOK    Patient is having the following symptoms: UC follow up//infected toe for ongoing days.    The patient is requesting an appointment for asap with provider.    Was an appointment offered for this call? No  If yes : Appointment type              Date    Preferred method for responding to this message: Telephone Call  What is your phone number ?[phone]    If we cannot reach you directly, may we leave a detailed response at the number you provided? Yes    Can this message wait until your PCP/provider returns, if unavailable today? Not applicable,     Inna Benson

## 2019-01-30 DIAGNOSIS — F90.2 ADHD (ATTENTION DEFICIT HYPERACTIVITY DISORDER), COMBINED TYPE: Primary | ICD-10-CM

## 2019-01-30 RX ORDER — LISDEXAMFETAMINE DIMESYLATE 60 MG/1
CAPSULE ORAL
Qty: 30 CAPSULE | Refills: 0 | Status: SHIPPED | OUTPATIENT
Start: 2019-01-30 | End: 2020-01-22

## 2019-02-01 NOTE — TELEPHONE ENCOUNTER
Patient contacted and notified Rx hard copy Vyvanse 60 mg will be walked to [facility].  Hard copy Rx dated for 1-30-19.  
Reviewed [place]  last filled 12/27/18. Will fill for today 1/30/19. CJ can you relay this to Vesna? She had appointment with us today however I assume cancelled being it's -30 F. Thank you.  
VYVANSE 60 MG capsule    Last refill date 12/27/18      Last office visit 3/10/16  
lisdexamfetamine (VYVANSE) 60 MG capsule      Last Written Prescription Date:  12/27/18  Last Fill Quantity: 30,   # refills: 0  Last Office Visit: 10/30/18  Future Office visit:    Next 5 appointments (look out 90 days)    Feb 19, 2019  8:20 AM CST  (Arrive by 8:05 AM)  Return Visit with Candelaria Clarke MD  [facility] [address] [place] [id number] [phone]           Routing refill request to provider for review/approval because:  Drug not on the [facility], [facility] or  Health refill protocol or controlled substance    
DISPLAY PLAN FREE TEXT

## 2019-10-22 ENCOUNTER — OFFICE VISIT (OUTPATIENT)
Dept: PSYCHIATRY | Facility: OTHER | Age: 34
End: 2019-10-22
Attending: PSYCHIATRY & NEUROLOGY
Payer: COMMERCIAL

## 2019-10-22 VITALS
HEIGHT: 65 IN | OXYGEN SATURATION: 98 % | WEIGHT: 200 LBS | SYSTOLIC BLOOD PRESSURE: 120 MMHG | HEART RATE: 69 BPM | TEMPERATURE: 98.9 F | DIASTOLIC BLOOD PRESSURE: 80 MMHG | BODY MASS INDEX: 33.32 KG/M2

## 2019-10-22 DIAGNOSIS — Z79.899 ENCOUNTER FOR LONG-TERM (CURRENT) USE OF MEDICATIONS: Primary | ICD-10-CM

## 2019-10-22 DIAGNOSIS — F90.2 ADHD (ATTENTION DEFICIT HYPERACTIVITY DISORDER), COMBINED TYPE: ICD-10-CM

## 2019-10-22 PROCEDURE — 99213 OFFICE O/P EST LOW 20 MIN: CPT | Performed by: PSYCHIATRY & NEUROLOGY

## 2019-10-22 PROCEDURE — 80307 DRUG TEST PRSMV CHEM ANLYZR: CPT | Mod: 90 | Performed by: PSYCHIATRY & NEUROLOGY

## 2019-10-22 PROCEDURE — 99000 SPECIMEN HANDLING OFFICE-LAB: CPT | Performed by: PSYCHIATRY & NEUROLOGY

## 2019-10-22 RX ORDER — LISDEXAMFETAMINE DIMESYLATE 60 MG/1
60 CAPSULE ORAL DAILY
Qty: 30 CAPSULE | Refills: 0 | Status: SHIPPED | OUTPATIENT
Start: 2019-10-22 | End: 2020-01-22

## 2019-10-22 RX ORDER — LISDEXAMFETAMINE DIMESYLATE 60 MG/1
60 CAPSULE ORAL DAILY
Qty: 30 CAPSULE | Refills: 0 | Status: SHIPPED | OUTPATIENT
Start: 2019-11-21 | End: 2020-01-22

## 2019-10-22 RX ORDER — LISDEXAMFETAMINE DIMESYLATE 60 MG/1
60 CAPSULE ORAL DAILY
Qty: 30 CAPSULE | Refills: 0 | Status: SHIPPED | OUTPATIENT
Start: 2019-12-20 | End: 2020-01-22

## 2019-10-22 ASSESSMENT — PAIN SCALES - GENERAL: PAINLEVEL: NO PAIN (0)

## 2019-10-22 ASSESSMENT — ANXIETY QUESTIONNAIRES
6. BECOMING EASILY ANNOYED OR IRRITABLE: NEARLY EVERY DAY
7. FEELING AFRAID AS IF SOMETHING AWFUL MIGHT HAPPEN: MORE THAN HALF THE DAYS
5. BEING SO RESTLESS THAT IT IS HARD TO SIT STILL: NOT AT ALL
2. NOT BEING ABLE TO STOP OR CONTROL WORRYING: MORE THAN HALF THE DAYS
3. WORRYING TOO MUCH ABOUT DIFFERENT THINGS: MORE THAN HALF THE DAYS
GAD7 TOTAL SCORE: 12
1. FEELING NERVOUS, ANXIOUS, OR ON EDGE: MORE THAN HALF THE DAYS

## 2019-10-22 ASSESSMENT — PATIENT HEALTH QUESTIONNAIRE - PHQ9
5. POOR APPETITE OR OVEREATING: SEVERAL DAYS
SUM OF ALL RESPONSES TO PHQ QUESTIONS 1-9: 16

## 2019-10-22 ASSESSMENT — MIFFLIN-ST. JEOR: SCORE: 1598.07

## 2019-10-22 NOTE — PROGRESS NOTES
PSYCHIATRY CLINIC PROGRESS NOTE   20 minute medication management, more than 50% of time spent counseling patient on medications, medication side effects, symptom history and management   SUBJECTIVE / INTERIM HISTORY                                                                        Social- with parents and Rafael Children- Rafael is 15 yo daughter     - Now working [employer]. Positives: more regular hours. Negatives: desk job. Big scan through [employer] and bunch of people were fired.  - Rafael is doing well aside from shoulder separation in relation to volleyball  - feels starting to head down a tunnel thus time to restart vyvanse. Hard time focusing, easily distractible    SUBSTANCE USE- reports no issues    SYMPTOMS-   SIB: Burning (last was in Oct '15) , Overwhelming, irritability, SIB  were becoming more of an issue again so she started working with therapist Hugh Uribe.  Irritability gotten better and moods bit better. Stress with uncertainly, lack of control  MEDICAL ROS-  Fatigue, headahces, constipation  MEDICAL / SURGICAL HISTORY                pregnant [if applicable]--no     Patient Active Problem List   Diagnosis     Family planning     Moderate major depression (H)     Anxiety     Chronic fatigue     Encounter for routine gynecological examination     Decreased libido     Fatigue     Smoker     ASCUS (atypical squamous cells of undetermined significance) on Pap smear     NO SHOW     ACP (advance care planning)     ALLERGY   Dust mites  MEDICATIONS                                                                                             Current Outpatient Medications   Medication Sig     Elderberry 575 MG/5ML SYRP Take 2 Doses by mouth daily Patient takes one to two tablespoons per day     lisdexamfetamine (VYVANSE) 60 MG capsule Take 1 capsule (60 mg) by mouth every morning (Patient not taking: Reported on 10/22/2019)     VYVANSE 60 MG capsule TAKE 1 CAPSULE BY MOUTH EVERY MORNING (Patient not  "taking: Reported on 10/22/2019)     No current facility-administered medications for this visit.        VITALS   /80   Pulse 69   Temp 98.9  F (37.2  C) (Tympanic)   Ht 1.651 m (5' 5\")   Wt 90.7 kg (200 lb)   SpO2 98%   BMI 33.28 kg/m       PHQ9                     [unfilled]  LABS                                                                                                                           TSH   Date Value Ref Range Status   04/21/2014 1.26 0.34 - 4.82 mU/L Final   ]    CBC RESULTS:   Recent Labs   Lab Test  04/21/14   1113   WBC  7.9   RBC  5.06   HGB  14.9   HCT  43.6   MCV  86   MCH  29.4   MCHC  34.2   RDW  13.2   PLT  380       MENTAL STATUS EXAM                                                                                        Alert. Oriented to person, place, and date / time. Well groomed, calm, cooperative with good eye contact. No problems with speech or psychomotor behavior. Mood was described as stressed and affect was congruent to speech content and full range. Thought process, including associations, was unremarkable and thought content was devoid of suicidal and homicidal ideation and psychotic thought. No hallucinations. Insight was good. Judgment was intact and adequate for safety. Fund of knowledge was intact. Pt demonstrates no obvious problems with attention, concentration, language, recent or remote memory although these were not formally tested.     ASSESSMENT                                                                                                      HISTORICAL:  Initial psych note 4/15/16         NOTES:   Notes: Luvox sedation     Vesna Fabian is a 33 yo with psychiatric hx of : depression, anxiety, ADHD, one hospital stay here at  around 2013. Was working with Hugh Uribe for psychotherapy several months on some trauma issues that happened and she has never discussed with others. Therapy helped significantly. Haven't seen for ~ year. She has new job " which we both feel seems overall to be a good thing. She would like to get back on Vyvanse: we agreed on restarting. She reviewed and agreed with controlled substance contract and will submit UDS as part of.      TREATMENT RISK STATEMENT:  The risks, benefits, alternatives and potential adverse effects have been explained and are understood by the pt.  The pt agrees to the treatment plan with the ability to do so.   The pt knows to call the clinic for any problems or access emergency care if needed.        DIAGNOSES                    MDD, recurrent, mod  ADHD       PLAN                                                                                                                    1)  MEDICATIONS:         -- Restart Vyvanse and gave scripts today for 10/22/19, 11/21 and for 12/20  2)  THERAPY: no change    3)  LABS: UDS    4)  PT MONITOR [call for probs]:  Worsening symptoms, SI/HI, SEs from meds    5)  REFERRALS [CD, medical, other]:  None    6)  RTC:    ~3 months

## 2019-10-22 NOTE — NURSING NOTE
"Chief Complaint   Patient presents with     RECHECK     ADHD, depression.  patient wants to get back on medications.       Initial /80 (BP Location: Right arm, Patient Position: Sitting, Cuff Size: Adult Regular)   Pulse 69   Temp 98.9  F (37.2  C) (Tympanic)   Ht 1.651 m (5' 5\")   Wt 90.7 kg (200 lb)   SpO2 98%   BMI 33.28 kg/m   Estimated body mass index is 33.28 kg/m  as calculated from the following:    Height as of this encounter: 1.651 m (5' 5\").    Weight as of this encounter: 90.7 kg (200 lb).  Medication Reconciliation: complete  SHEYLA BECERRA LPN    "

## 2019-10-22 NOTE — LETTER
[facility]  10/22/19    Patient: Vesna Fabian  YOB: 1985  Medical Record Number: [id number]  CSN: [id number]                                                                              Non-opioid Controlled Substance Agreement    I understand that my care provider has prescribed a controlled substance to help manage my condition(s). I am taking this medicine to help me function or work. I know this is strong medicine, and that it can cause serious side effects. Controlled substances can be sedating, addicting and may cause a dependency on the drug. They can affect my ability to drive or think, and cause depression. They need to be taken exactly as prescribed. Combining controlled substances with certain medicines or chemicals (such as cocaine, sedatives and tranquilizers, sleeping pills, meth) can be dangerous or even fatal. Also, if I stop controlled substances suddenly, I may have severe withdrawal symptoms.  If not helpful, I may be asked to stop them.    The risks, benefits, and side effects of these medicine(s) were explained to me. I agree that:    1. I will take part in other treatments as advised by my care team. This may be psychiatry or counseling, physical therapy, behavioral therapy, group treatment or a referral to a pain clinic. I will reduce or stop my medicine when my care team tells me to do so.  2. I will take my medicines as prescribed. I will not change the dose or schedule unless my care team tells me to. There will be no refills if I  run out early.   I may be contactedwithout warning and asked to complete a urine drug test or pill count at any time.   3. I will keep all my appointments, and understand this is part of the monitoring of controlled substances. My care team may require an office visit for EVERY controlled substance refill. If I miss appointments or don t follow instructions, my care team may stop my medicine.  4. I will not ask other providers to  prescribe controlled substances, and I will not accept controlled substances from other people. If I need another prescribed controlled substance for a new reason, I will tell my care team within 1 business day.  5. I will use one pharmacy to fill all of my controlled substance prescriptions, and it is up to me to make sure that I do not run out of my medicines on weekends or holidays. If my care team is willing to refill my controlled substance prescription without a visit, I must request refills only during office hours, refills may take up to 3 days to process, and it may take up to 5 to 7 days for my medicine to be mailed and ready at my pharmacy. Prescriptions will not be mailed anywhere except my pharmacy.    6. I am responsible for my prescriptions. If the medicine/prescription is lost or stolen, it will not be replaced. I also agree not to share controlled substance medicines with anyone.              [facility]  10/22/19  Patient:  Vesna Fabian  YOB: 1985  Medical Record Number: [id number]  [facility]: [id number]    7. I agree to not use ANY illegal or recreational drugs. This includes marijuana, cocaine, bath salts or other drugs. I agree not to use alcohol unless my care team says I may. I agree to give urine samples whenever asked. If I don t give a urine sample, the care team may stop my medicine.    8. If I enroll in the [place] Medical Marijuana program, I will tell my care team. I will also sign an agreement to share my medical records with my care team.    9. I will bring in my list of medicines (or my medicine bottles) each time I come to the clinic.   10. I will tell my care team right away if I become pregnant or have a new medical problem treated outside of my regular clinic.  11. I understand that this medicine can affect my thinking and judgment. It may be unsafe for me to drive, use machinery and do dangerous tasks. I will not do any of these things until I know how the  medicine affects me. If my dose changes, I will wait to see how it affects me. I will contact my care team if I have concerns about medicine side effects.    I understand that if I do not follow any of the conditions above, my prescriptions or treatment may be stopped.      I agree that my provider, clinic care team, and pharmacy may work with any city, state or federal law enforcement agency that investigates the misuse, sale, or other diversion of my controlled medicine. I will allow my provider to discuss my care with or share a copy of this agreement with any other treating provider, pharmacy or emergency room where I receive care. I agree to give up (waive) any right of privacy or confidentiality with respect to these consents.   I have read this agreement and have asked questions about anything I did not understand.    ____________________________________________________    ____________  ________  Patient signature                                                         Date      Time    ____________________________________________________     ____________  ________  Witness                                                          Date      Time    ____________________________________________________  Provider signature

## 2019-10-23 ASSESSMENT — ANXIETY QUESTIONNAIRES: GAD7 TOTAL SCORE: 12

## 2019-10-26 LAB — PAIN DRUG SCR UR W RPTD MEDS: NORMAL

## 2020-01-22 ENCOUNTER — OFFICE VISIT (OUTPATIENT)
Dept: PSYCHIATRY | Facility: OTHER | Age: 35
End: 2020-01-22
Attending: PSYCHIATRY & NEUROLOGY
Payer: COMMERCIAL

## 2020-01-22 VITALS
TEMPERATURE: 99.2 F | BODY MASS INDEX: 31.65 KG/M2 | HEART RATE: 93 BPM | DIASTOLIC BLOOD PRESSURE: 90 MMHG | HEIGHT: 65 IN | OXYGEN SATURATION: 98 % | SYSTOLIC BLOOD PRESSURE: 120 MMHG | WEIGHT: 190 LBS

## 2020-01-22 DIAGNOSIS — F90.2 ADHD (ATTENTION DEFICIT HYPERACTIVITY DISORDER), COMBINED TYPE: ICD-10-CM

## 2020-01-22 PROCEDURE — 99213 OFFICE O/P EST LOW 20 MIN: CPT | Performed by: PSYCHIATRY & NEUROLOGY

## 2020-01-22 RX ORDER — LISDEXAMFETAMINE DIMESYLATE 60 MG/1
60 CAPSULE ORAL DAILY
Qty: 30 CAPSULE | Refills: 0 | Status: SHIPPED | OUTPATIENT
Start: 2020-03-20 | End: 2020-03-12

## 2020-01-22 RX ORDER — LISDEXAMFETAMINE DIMESYLATE 60 MG/1
60 CAPSULE ORAL DAILY
Qty: 30 CAPSULE | Refills: 0 | Status: SHIPPED | OUTPATIENT
Start: 2020-01-22 | End: 2020-06-15

## 2020-01-22 RX ORDER — LISDEXAMFETAMINE DIMESYLATE 60 MG/1
60 CAPSULE ORAL DAILY
Qty: 30 CAPSULE | Refills: 0 | Status: SHIPPED | OUTPATIENT
Start: 2020-02-20 | End: 2020-03-12

## 2020-01-22 ASSESSMENT — PATIENT HEALTH QUESTIONNAIRE - PHQ9
SUM OF ALL RESPONSES TO PHQ QUESTIONS 1-9: 8
5. POOR APPETITE OR OVEREATING: NOT AT ALL

## 2020-01-22 ASSESSMENT — PAIN SCALES - GENERAL: PAINLEVEL: NO PAIN (0)

## 2020-01-22 ASSESSMENT — ANXIETY QUESTIONNAIRES
1. FEELING NERVOUS, ANXIOUS, OR ON EDGE: NOT AT ALL
2. NOT BEING ABLE TO STOP OR CONTROL WORRYING: NOT AT ALL
6. BECOMING EASILY ANNOYED OR IRRITABLE: NOT AT ALL
GAD7 TOTAL SCORE: 0
5. BEING SO RESTLESS THAT IT IS HARD TO SIT STILL: NOT AT ALL
3. WORRYING TOO MUCH ABOUT DIFFERENT THINGS: NOT AT ALL
7. FEELING AFRAID AS IF SOMETHING AWFUL MIGHT HAPPEN: NOT AT ALL

## 2020-01-22 ASSESSMENT — MIFFLIN-ST. JEOR: SCORE: 1552.71

## 2020-01-22 NOTE — NURSING NOTE
"Chief Complaint   Patient presents with     RECHECK     ADHD, depression.  Medication review.  Patient c/o feeling tired and exhausted.       Initial BP (!) 120/90 (BP Location: Left arm, Patient Position: Sitting, Cuff Size: Adult Regular)   Pulse 93   Temp 99.2  F (37.3  C) (Tympanic)   Ht 1.651 m (5' 5\")   Wt 86.2 kg (190 lb)   SpO2 98%   BMI 31.62 kg/m   Estimated body mass index is 31.62 kg/m  as calculated from the following:    Height as of this encounter: 1.651 m (5' 5\").    Weight as of this encounter: 86.2 kg (190 lb).  Medication Reconciliation: complete  SHEYLA BECERRA LPN    "

## 2020-01-22 NOTE — PROGRESS NOTES
"  PSYCHIATRY CLINIC PROGRESS NOTE   20 minute medication management, more than 50% of time spent counseling patient on medications, medication side effects, symptom history and management   SUBJECTIVE / INTERIM HISTORY                                                                        Social- with parents and Rafael Children- Rafael is 15 yo daughter   - back on vyvanse notes improvement  - Now working [employer]. Positives: more regular hours. Negatives: desk job.   - been really tired. Exhausted. Works until 4:30 and tends to not do much when she gets home besides get dinner and go to bed    SUBSTANCE USE- reports no issues    SYMPTOMS-   SIB: Burning (last was in Oct '15) , Overwhelming, irritability, SIB  were becoming more of an issue again so she started working with therapist Hugh Uribe.  Irritability gotten better and moods bit better. Stress with uncertainly, lack of control  MEDICAL ROS-  Fatigue, (headaches, constipation) these are more with stress  MEDICAL / SURGICAL HISTORY                pregnant [if applicable]--no     Patient Active Problem List   Diagnosis     Family planning     Moderate major depression (H)     Anxiety     Chronic fatigue     Encounter for routine gynecological examination     Decreased libido     Fatigue     Smoker     ASCUS (atypical squamous cells of undetermined significance) on Pap smear     NO SHOW     ACP (advance care planning)     ALLERGY   Dust mites  MEDICATIONS                                                                                             Current Outpatient Medications   Medication Sig     Elderberry 575 MG/5ML SYRP Take 2 Doses by mouth daily Patient takes one to two tablespoons per day     No current facility-administered medications for this visit.        VITALS   BP (!) 120/90 (BP Location: Left arm, Patient Position: Sitting, Cuff Size: Adult Regular)   Pulse 93   Temp 99.2  F (37.3  C) (Tympanic)   Ht 1.651 m (5' 5\")   Wt 86.2 kg (190 lb)   " SpO2 98%   BMI 31.62 kg/m       PHQ9                     [unfilled]  LABS                                                                                                                           TSH   Date Value Ref Range Status   04/21/2014 1.26 0.34 - 4.82 mU/L Final   ]    CBC RESULTS:   Recent Labs   Lab Test  04/21/14   1113   WBC  7.9   RBC  5.06   HGB  14.9   HCT  43.6   MCV  86   MCH  29.4   MCHC  34.2   RDW  13.2   PLT  380       MENTAL STATUS EXAM                                                                                        Alert. Oriented to person, place, and date / time. Well groomed, calm, cooperative with good eye contact. No problems with speech or psychomotor behavior. Mood was euthymic  and affect was congruent to speech content and full range. Thought process, including associations, was unremarkable and thought content was devoid of suicidal and homicidal ideation and psychotic thought. No hallucinations. Insight was good. Judgment was intact and adequate for safety. Fund of knowledge was intact. Pt demonstrates no obvious problems with attention, concentration, language, recent or remote memory although these were not formally tested.     ASSESSMENT                                                                                                      HISTORICAL:  Initial psych note 4/15/16         NOTES:   Notes: Luvox sedation     Vesna Fabian is a 33 yo with psychiatric hx of : depression, anxiety, ADHD, one hospital stay here at  around 2013. Was working with Hugh Uribe for psychotherapy several months on some trauma issues that happened and she has never discussed with others. Therapy helped significantly. We restarted her on Vyvanse last visit and is helping. No changes today.      TREATMENT RISK STATEMENT:  The risks, benefits, alternatives and potential adverse effects have been explained and are understood by the pt.  The pt agrees to the treatment plan with the ability  to do so.   The pt knows to call the clinic for any problems or access emergency care if needed.        DIAGNOSES                    MDD, recurrent, mod  ADHD       PLAN                                                                                                                    1)  MEDICATIONS:         -- Refill Vyvanse today 60 mg for today, Feb and for March  2)  THERAPY: no change    3)  LABS: none    4)  PT MONITOR [call for probs]:  Worsening symptoms, SI/HI, SEs from meds    5)  REFERRALS [CD, medical, other]:  None    6)  RTC:    ~3 months

## 2020-01-23 ASSESSMENT — ANXIETY QUESTIONNAIRES: GAD7 TOTAL SCORE: 0

## 2020-03-12 ENCOUNTER — HOSPITAL ENCOUNTER (EMERGENCY)
Facility: HOSPITAL | Age: 35
Discharge: HOME | End: 2020-03-12
Attending: NURSE PRACTITIONER | Admitting: NURSE PRACTITIONER
Payer: COMMERCIAL

## 2020-03-12 VITALS
RESPIRATION RATE: 16 BRPM | TEMPERATURE: 98.9 F | SYSTOLIC BLOOD PRESSURE: 144 MMHG | OXYGEN SATURATION: 97 % | DIASTOLIC BLOOD PRESSURE: 99 MMHG

## 2020-03-12 DIAGNOSIS — J11.1 INFLUENZA-LIKE ILLNESS: ICD-10-CM

## 2020-03-12 LAB
FLUAV+FLUBV RNA SPEC QL NAA+PROBE: NEGATIVE
FLUAV+FLUBV RNA SPEC QL NAA+PROBE: NEGATIVE
RSV RNA SPEC NAA+PROBE: NEGATIVE
SPECIMEN SOURCE: NORMAL

## 2020-03-12 PROCEDURE — G0463 HOSPITAL OUTPT CLINIC VISIT: HCPCS

## 2020-03-12 PROCEDURE — 99213 OFFICE O/P EST LOW 20 MIN: CPT | Mod: Z6 | Performed by: NURSE PRACTITIONER

## 2020-03-12 PROCEDURE — 87631 RESP VIRUS 3-5 TARGETS: CPT | Performed by: EMERGENCY MEDICINE

## 2020-03-12 ASSESSMENT — ENCOUNTER SYMPTOMS
WHEEZING: 1
CHILLS: 1
SHORTNESS OF BREATH: 0
NAUSEA: 1
SORE THROAT: 1
ACTIVITY CHANGE: 1
DIZZINESS: 1
FEVER: 1
FATIGUE: 1
PSYCHIATRIC NEGATIVE: 1
HEADACHES: 1
CARDIOVASCULAR NEGATIVE: 1
EYES NEGATIVE: 1
COUGH: 0
HEMATOLOGIC/LYMPHATIC NEGATIVE: 1
ENDOCRINE NEGATIVE: 1
ALLERGIC/IMMUNOLOGIC NEGATIVE: 1

## 2020-03-12 NOTE — ED TRIAGE NOTES
Patient arrives with complaints of body aches, fevers, and pharyngitis for the past 2 days. Infrequent cough.

## 2020-03-12 NOTE — DISCHARGE INSTRUCTIONS
Rest  Stay hydrated  Continue with cold and flu medicine  Follow up in the clinic if no improvement  Return here if worsening symptoms, such as shortness of breath, breathing problem, any worsening symptoms or any concerns

## 2020-03-12 NOTE — ED PROVIDER NOTES
History     Chief Complaint   Patient presents with     Generalized Body Aches     The history is provided by the patient.     Vesna Fabian is a 34 year old female who presents to the  for cold/flu like symptoms 2 days. She has tried OTC cold/flu medicine does help her sleep. She states she is staying hydrated.  She is sleeping a lot at this time.     Allergies:  Allergies   Allergen Reactions     Dust Mites      House dust       Problem List:    Patient Active Problem List    Diagnosis Date Noted     ACP (advance care planning) 03/10/2016     Priority: Medium     Advance Care Planning 3/10/2016: ACP Review of Chart / Resources Provided:  Reviewed chart for advance care plan.  Vesna Fabian has been provided information and resources to begin or update their advance care plan.  Added by Kenisha Chilel             NO SHOW 06/18/2015     Priority: Medium     No shows for Dr. Valdez : 5/15/13; 4/29/15         ASCUS (atypical squamous cells of undetermined significance) on Pap smear 04/29/2014     Priority: Medium     Encounter for routine gynecological examination 04/21/2014     Priority: Medium     Problem list name updated by automated process. Provider to review       Decreased libido 04/21/2014     Priority: Medium     Sensate focus       Fatigue 04/21/2014     Priority: Medium     Smoker 04/21/2014     Priority: Medium     Ready to quit. Only 3 cigs in a week       Family planning 04/22/2013     Priority: Medium     Moderate major depression (H) 04/22/2013     Priority: Medium     Denies suicidal or homicidal ideations at this time. Working with RALÚ Molina       Anxiety 04/22/2013     Priority: Medium     Chronic fatigue 04/22/2013     Priority: Medium        Past Medical History:    Past Medical History:   Diagnosis Date     Attention deficit disorder without mention of hyperactivity 5/31/2000     Bipolar affective disorder 8/9/2012     Bulimia 8/9/2012     Self-injurious behavior 8/9/2012     Suicide  attempt 2012       Past Surgical History:    Past Surgical History:   Procedure Laterality Date     cyst ovary      LT     MAMMOPLASTY REDUCTION  2011     wisdom teeth removed         Family History:    Family History   Problem Relation Age of Onset     Cancer Maternal Grandmother         pancreatic cancer; cause of death     Cancer Father         rectal     Colon Cancer Father      Heart Disease Paternal Grandmother         heart disease     Hypertension Mother        Social History:  Marital Status:  Single [1]  Social History     Tobacco Use     Smoking status: Former Smoker     Years: 10.00     Types: Cigarettes     Last attempt to quit: 2020     Years since quittin.1     Smokeless tobacco: Never Used     Tobacco comment: quit . longest tobacco free 1 year. no passive exposure   Substance Use Topics     Alcohol use: Yes     Comment: rarely     Drug use: No        Medications:    Elderberry 575 MG/5ML SYRP          Review of Systems   Constitutional: Positive for activity change, chills, fatigue and fever.   HENT: Positive for ear pain and sore throat.    Eyes: Negative.    Respiratory: Positive for wheezing. Negative for cough and shortness of breath.    Cardiovascular: Negative.    Gastrointestinal: Positive for nausea.   Endocrine: Negative.    Genitourinary: Negative.    Musculoskeletal:        Generalized body aches   Skin:        Feels extreme dry with occasional red spots   Allergic/Immunologic: Negative.    Neurological: Positive for dizziness and headaches.   Hematological: Negative.    Psychiatric/Behavioral: Negative.        Physical Exam   BP: 144/99  Heart Rate: 86  Temp: 98.9  F (37.2  C)  Resp: 16  SpO2: 97 %      Physical Exam  Vitals signs and nursing note reviewed.   Constitutional:       Appearance: She is ill-appearing.   HENT:      Right Ear: Tympanic membrane, ear canal and external ear normal.      Left Ear: Tympanic membrane, ear canal and external ear normal.      Nose:  Nose normal.      Mouth/Throat:      Mouth: Mucous membranes are moist.   Cardiovascular:      Rate and Rhythm: Normal rate.      Heart sounds: Normal heart sounds.   Pulmonary:      Effort: Pulmonary effort is normal. No respiratory distress.      Breath sounds: Normal breath sounds. No wheezing.      Comments: Cough   Abdominal:      General: Bowel sounds are normal.      Palpations: Abdomen is soft.      Tenderness: There is no abdominal tenderness.   Skin:     General: Skin is warm.   Neurological:      Mental Status: She is alert and oriented to person, place, and time.         ED Course        Procedures               Critical Care time:  none               Results for orders placed or performed during the hospital encounter of 03/12/20 (from the past 24 hour(s))   Influenza A and B and RSV PCR   Result Value Ref Range    Specimen Description Nasopharyngeal     Influenza A PCR Negative NEG^Negative    Influenza B PCR Negative NEG^Negative    Resp Syncytial Virus Negative NEG^Negative       Medications - No data to display    Assessments & Plan (with Medical Decision Making)     I have reviewed the nursing notes.    I have reviewed the findings, diagnosis, plan and need for follow up with the patient.      Patient verbally educated and given appropriate education sheets for each of the diagnoses and has no questions.  Take OTC motrin or tylenol as directed on the bottle as needed.  Take prescription medications as directed.  Increase fluids, wash hands often.  Sleep in a recliner or with multiple pillows until this has resolved.  Follow up with your provider if symptoms increase or if further concerns develop, return to the ER.    Rest  Stay hydrated  Continue with cold and flu medicine  Follow up in the clinic if no improvement  Return here if worsening symptoms, such as shortness of breath, breathing problem, any worsening symptoms or any concerns     Discharge Medication List as of 3/12/2020  4:02 PM           Final diagnoses:   Influenza-like illness       3/12/2020   [facility]     [facility] HospitalFátima lin, ZAFAR CNP  03/12/20 2931

## 2020-03-12 NOTE — ED AVS SNAPSHOT
[facility]  [address] [address]  Phone:  [phone]                                    Vesna Fabian   MRN: [id number]    Department:  [facility]   Date of Visit:  3/12/2020           After Visit Summary Signature Page    I have received my discharge instructions, and my questions have been answered. I have discussed any challenges I see with this plan with the nurse or doctor.    ..........................................................................................................................................  Patient/Patient Representative Signature      ..........................................................................................................................................  Patient Representative Print Name and Relationship to Patient    ..................................................               ................................................  Date                                   Time    ..........................................................................................................................................  Reviewed by Signature/Title    ...................................................              ..............................................  Date                                               Time          22EPIC Rev 08/18

## 2020-03-12 NOTE — ED TRIAGE NOTES
Presents for body aches, sore throat, chills, headache since Tuesday.   Took over the counter cold and flu med today at 1300.

## 2020-03-12 NOTE — LETTER
March 12, 2020      To Whom It May Concern:      Vesna Fabian was seen in our Emergency Department today, 03/12/20.  I expect her condition to improve over the next 2-3 days. She also missed since Monday due to family and self illness.  She may return to work/school when improved.    Sincerely,        ZAFAR Valverde CNP

## 2020-04-14 ENCOUNTER — VIRTUAL VISIT (OUTPATIENT)
Dept: PSYCHIATRY | Facility: OTHER | Age: 35
End: 2020-04-14
Attending: PSYCHIATRY & NEUROLOGY
Payer: COMMERCIAL

## 2020-04-14 DIAGNOSIS — F90.2 ADHD (ATTENTION DEFICIT HYPERACTIVITY DISORDER), COMBINED TYPE: Primary | ICD-10-CM

## 2020-04-14 PROCEDURE — 99213 OFFICE O/P EST LOW 20 MIN: CPT | Mod: TEL | Performed by: PSYCHIATRY & NEUROLOGY

## 2020-04-14 RX ORDER — LISDEXAMFETAMINE DIMESYLATE 60 MG/1
60 CAPSULE ORAL DAILY
Qty: 30 CAPSULE | Refills: 0 | Status: SHIPPED | OUTPATIENT
Start: 2020-06-08 | End: 2020-07-22

## 2020-04-14 RX ORDER — LISDEXAMFETAMINE DIMESYLATE 60 MG/1
60 CAPSULE ORAL EVERY MORNING
COMMUNITY
End: 2020-06-15

## 2020-04-14 RX ORDER — LISDEXAMFETAMINE DIMESYLATE 60 MG/1
60 CAPSULE ORAL DAILY
Qty: 30 CAPSULE | Refills: 0 | Status: SHIPPED | OUTPATIENT
Start: 2020-04-14 | End: 2020-06-15

## 2020-04-14 RX ORDER — LISDEXAMFETAMINE DIMESYLATE 60 MG/1
60 CAPSULE ORAL DAILY
Qty: 30 CAPSULE | Refills: 0 | Status: SHIPPED | OUTPATIENT
Start: 2020-05-11 | End: 2020-06-15

## 2020-04-14 ASSESSMENT — ANXIETY QUESTIONNAIRES
GAD7 TOTAL SCORE: 3
6. BECOMING EASILY ANNOYED OR IRRITABLE: NEARLY EVERY DAY
4. TROUBLE RELAXING: NOT AT ALL
7. FEELING AFRAID AS IF SOMETHING AWFUL MIGHT HAPPEN: NOT AT ALL
1. FEELING NERVOUS, ANXIOUS, OR ON EDGE: NOT AT ALL
2. NOT BEING ABLE TO STOP OR CONTROL WORRYING: NOT AT ALL
3. WORRYING TOO MUCH ABOUT DIFFERENT THINGS: NOT AT ALL
5. BEING SO RESTLESS THAT IT IS HARD TO SIT STILL: NOT AT ALL

## 2020-04-14 ASSESSMENT — PATIENT HEALTH QUESTIONNAIRE - PHQ9: SUM OF ALL RESPONSES TO PHQ QUESTIONS 1-9: 6

## 2020-04-14 NOTE — NURSING NOTE
"Chief Complaint   Patient presents with     RECHECK       Initial There were no vitals taken for this visit. Estimated body mass index is 31.62 kg/m  as calculated from the following:    Height as of 1/22/20: 1.651 m (5' 5\").    Weight as of 1/22/20: 86.2 kg (190 lb).  Medication Reconciliation: complete  Nikole Osuna LPN  "

## 2020-04-14 NOTE — PROGRESS NOTES
"Vesna Fabian is a 34 year old female who is being evaluated via a billable telephone visit.      The patient has been notified of following:     \"This telephone visit will be conducted via a call between you and your physician/provider. We have found that certain health care needs can be provided without the need for a physical exam.  This service lets us provide the care you need with a short phone conversation.  If a prescription is necessary we can send it directly to your pharmacy.  If lab work is needed we can place an order for that and you can then stop by our lab to have the test done at a later time.    Telephone visits are billed at different rates depending on your insurance coverage. During this emergency period, for some insurers they may be billed the same as an in-person visit.  Please reach out to your insurance provider with any questions.    If during the course of the call the physician/provider feels a telephone visit is not appropriate, you will not be charged for this service.\"    Patient has given verbal consent for Telephone visit?  Yes    How would you like to obtain your AVS? Jericat      Phone call duration: 7 minutes      SUBJECTIVE / INTERIM HISTORY                                                                        Social- with parents and Rafael Children- Rafael is 15 yo daughter   Last visit Jan '20: Refill Vyvanse today 60 mg for today, Feb and for March  - going okay despite we are in covid-19  - back on vyvanse notes improvement  - Now working [employer]. Positives: more regular hours. Negatives: desk job.   - been really tired. Exhausted. Works until 4:30 and tends to not do much when she gets home besides get dinner and go to bed    SUBSTANCE USE- reports no issues    SYMPTOMS-   SIB: Burning (last was in Oct '15) , Overwhelming, irritability, SIB  were becoming more of an issue again so she started working with therapist Hugh Uribe.  Irritability gotten better and moods bit " better. Stress with uncertainly, lack of control  MEDICAL ROS-  Fatigue, (headaches, constipation) these are more with stress  MEDICAL / SURGICAL HISTORY                pregnant [if applicable]--no     Patient Active Problem List   Diagnosis     Family planning     Moderate major depression (H)     Anxiety     Chronic fatigue     Encounter for routine gynecological examination     Decreased libido     Fatigue     Smoker     ASCUS (atypical squamous cells of undetermined significance) on Pap smear     NO SHOW     ACP (advance care planning)     ALLERGY   Dust mites  MEDICATIONS                                                                                             Current Outpatient Medications   Medication Sig     lisdexamfetamine (VYVANSE) 60 MG capsule Take 60 mg by mouth every morning     Elderberry 575 MG/5ML SYRP Take 2 Doses by mouth daily Patient takes one to two tablespoons per day     No current facility-administered medications for this visit.        VITALS   There were no vitals taken for this visit.     PHQ9                     [unfilled]  LABS                                                                                                                           TSH   Date Value Ref Range Status   04/21/2014 1.26 0.34 - 4.82 mU/L Final   ]    CBC RESULTS:   Recent Labs   Lab Test  04/21/14   1113   WBC  7.9   RBC  5.06   HGB  14.9   HCT  43.6   MCV  86   MCH  29.4   MCHC  34.2   RDW  13.2   PLT  380       MENTAL STATUS EXAM                                                                                       Speech: normal volume, rhythm, rate.  Mood was euthymic . Thought process, including associations, was unremarkable and thought content was devoid of suicidal and homicidal ideation and psychotic thought. No hallucinations. Insight was good. Judgment was intact and adequate for safety. Fund of knowledge was intact. Pt demonstrates no obvious problems with attention, concentration,  language, recent or remote memory although these were not formally tested.     ASSESSMENT                                                                                                      HISTORICAL:  Initial psych note 4/15/16         NOTES:   Notes: Luvox sedation     Vesna Fabian is a 35 yo with psychiatric hx of : depression, anxiety, ADHD, one hospital stay here at  around 2013. Was working with Hugh Uribe for psychotherapy several months on some trauma issues that happened and she has never discussed with others. Therapy helped significantly. We restarted her on Vyvanse couple visits ago and she is doing well at this. No changes today.      TREATMENT RISK STATEMENT:  The risks, benefits, alternatives and potential adverse effects have been explained and are understood by the pt.  The pt agrees to the treatment plan with the ability to do so.   The pt knows to call the clinic for any problems or access emergency care if needed.        DIAGNOSES                    MDD, recurrent, mod  ADHD       PLAN                                                                                                                    1)  MEDICATIONS:         -- Refill Vyvanse today 60 mg daily and last script was March so filled for 4/14/, 5/11 and for 6/8  2)  THERAPY: no change    3)  LABS: none    4)  PT MONITOR [call for probs]:  Worsening symptoms, SI/HI, SEs from meds    5)  REFERRALS [CD, medical, other]:  None    6)  RTC:    ~3 months

## 2020-04-15 ASSESSMENT — ANXIETY QUESTIONNAIRES: GAD7 TOTAL SCORE: 3

## 2020-06-15 ENCOUNTER — HOSPITAL ENCOUNTER (EMERGENCY)
Facility: HOSPITAL | Age: 35
Discharge: HOME | End: 2020-06-15
Attending: NURSE PRACTITIONER | Admitting: NURSE PRACTITIONER
Payer: COMMERCIAL

## 2020-06-15 ENCOUNTER — APPOINTMENT (OUTPATIENT)
Dept: GENERAL RADIOLOGY | Facility: HOSPITAL | Age: 35
End: 2020-06-15
Attending: FAMILY MEDICINE
Payer: COMMERCIAL

## 2020-06-15 VITALS
DIASTOLIC BLOOD PRESSURE: 70 MMHG | SYSTOLIC BLOOD PRESSURE: 118 MMHG | RESPIRATION RATE: 18 BRPM | OXYGEN SATURATION: 100 % | TEMPERATURE: 99.1 F | HEART RATE: 89 BPM

## 2020-06-15 DIAGNOSIS — S89.92XA KNEE INJURY, LEFT, INITIAL ENCOUNTER: ICD-10-CM

## 2020-06-15 PROCEDURE — 25000128 H RX IP 250 OP 636: Performed by: NURSE PRACTITIONER

## 2020-06-15 PROCEDURE — 99213 OFFICE O/P EST LOW 20 MIN: CPT | Mod: Z6 | Performed by: NURSE PRACTITIONER

## 2020-06-15 PROCEDURE — 73562 X-RAY EXAM OF KNEE 3: CPT | Mod: TC,LT

## 2020-06-15 PROCEDURE — 96372 THER/PROPH/DIAG INJ SC/IM: CPT

## 2020-06-15 PROCEDURE — G0463 HOSPITAL OUTPT CLINIC VISIT: HCPCS | Mod: 25

## 2020-06-15 RX ORDER — TIZANIDINE 2 MG/1
2 TABLET ORAL 3 TIMES DAILY PRN
Qty: 12 TABLET | Refills: 0 | Status: SHIPPED | OUTPATIENT
Start: 2020-06-15 | End: 2020-07-15

## 2020-06-15 RX ORDER — KETOROLAC TROMETHAMINE 30 MG/ML
30 INJECTION, SOLUTION INTRAMUSCULAR; INTRAVENOUS ONCE
Status: COMPLETED | OUTPATIENT
Start: 2020-06-15 | End: 2020-06-15

## 2020-06-15 RX ADMIN — KETOROLAC TROMETHAMINE 30 MG: 30 INJECTION, SOLUTION INTRAMUSCULAR at 09:43

## 2020-06-15 NOTE — DISCHARGE INSTRUCTIONS
Keep affected extremity elevated as much as possible for next 24 - 48 hours. Ice to affected area 20 minutes every hour as needed for comfort. After 48 hours you can apply heat. Ibuprofen 600 to 800 mg (3 - 4 tabs of over the counter med) every six to eight hours as needed;not to exceed maximum amount of 3200 mg in 24 hours.Tylenol 650 to 1000 mg every four to six hours as needed (not to exceed more than 4000 mg in a 24 hour period). May use interchangeably. Suggest medicating around the clock for the next 24-48 hours. Use ace wrap and knee immobilizer  and crutches until you can walk on your knee without pain. Slowly start to wiggle your toes and move ankle and foot as often as possible but not beyond the point of pain. Follow up with primary provider or orthopedics as needed.  Orthopedic referral placed

## 2020-06-15 NOTE — ED PROVIDER NOTES
History     Chief Complaint   Patient presents with     Knee Pain     HPI  Vesna Fabian is a 34 year old female who slipped in the middle of the night because of water on her bathroom floor. She fell vertically onto her left knee. She now complains of left knee pain with the pain radiating up into her left upper thigh and groin. Her hip and her ankle are ok. The pain is worse with bending and straightening the knee. Denies previous problems or injuries. She has iced and elevated it and her last dose of ibuprofen 800 mg was this morning. Denies numbness and tingling into her foot. Denies fevers, chills, nausea, and vomiting.    Musculoskeletal problem/pain      Duration: four days ago. vertically fell on her left knee, in the middle of the night when she got up to go to the [place]. she slipped on water.     Description  Location: left knee.    Intensity:  6/10 at rest. 9/10    Accompanying signs and symptoms: radiation of pain to upper thigh and groin medial side and numbness over knee whole leg is throbbing. hip and ankle Ok    History  Previous similar problem: no   Previous evaluation:  none    Precipitating or alleviating factors:  Trauma or overuse: YES- fell on bedroom floor  Aggravating factors include: worse with bending and straightening    Therapies tried and outcome: ice, Ibuprofen 400 mg at 0800 and elevation     Allergies:  Allergies   Allergen Reactions     Dust Mites      House dust       Problem List:    Patient Active Problem List    Diagnosis Date Noted     ACP (advance care planning) 03/10/2016     Priority: Medium     Advance Care Planning 3/10/2016: ACP Review of Chart / Resources Provided:  Reviewed chart for advance care plan.  Vesna Fabian has been provided information and resources to begin or update their advance care plan.  Added by Kenisha Chilel             NO SHOW 06/18/2015     Priority: Medium     No shows for Dr. Valdez : 5/15/13; 4/29/15         ASCUS (atypical squamous cells of  undetermined significance) on Pap smear 2014     Priority: Medium     Encounter for routine gynecological examination 2014     Priority: Medium     Problem list name updated by automated process. Provider to review       Decreased libido 2014     Priority: Medium     Sensate focus       Fatigue 2014     Priority: Medium     Smoker 2014     Priority: Medium     Ready to quit. Only 3 cigs in a week       Family planning 2013     Priority: Medium     Moderate major depression (H) 2013     Priority: Medium     Denies suicidal or homicidal ideations at this time. Working with RAÚL Molina       Anxiety 2013     Priority: Medium     Chronic fatigue 2013     Priority: Medium        Past Medical History:    Past Medical History:   Diagnosis Date     Attention deficit disorder without mention of hyperactivity 2000     Bipolar affective disorder 2012     Bulimia 2012     Self-injurious behavior 2012     Suicide attempt 2012       Past Surgical History:    Past Surgical History:   Procedure Laterality Date     cyst ovary      LT     MAMMOPLASTY REDUCTION       wisdom teeth removed         Family History:    Family History   Problem Relation Age of Onset     Cancer Maternal Grandmother         pancreatic cancer; cause of death     Cancer Father         rectal     Colon Cancer Father      Heart Disease Paternal Grandmother         heart disease     Hypertension Mother        Social History:  Marital Status:  Single [1]  Social History     Tobacco Use     Smoking status: Former Smoker     Years: 10.00     Types: Cigarettes     Last attempt to quit: 2020     Years since quittin.4     Smokeless tobacco: Never Used     Tobacco comment: quit . longest tobacco free 1 year. no passive exposure   Substance Use Topics     Alcohol use: Yes     Comment: rarely     Drug use: No        Medications:    lisdexamfetamine (VYVANSE) 60 MG  capsule  tiZANidine (ZANAFLEX) 2 MG tablet          Review of Systems   Constitutional: Positive for activity change. Negative for chills and fever.   Gastrointestinal: Negative for nausea and vomiting.   Musculoskeletal:        Left knee pain   Skin: Negative.    Neurological: Negative for numbness.       Physical Exam   BP: (!) 142/104  Pulse: 89  Temp: 99.1  F (37.3  C)  Resp: 18  SpO2: 100 %      Physical Exam  Vitals signs and nursing note reviewed.   Constitutional:       General: She is in acute distress.      Appearance: Normal appearance. She is overweight.   Cardiovascular:      Rate and Rhythm: Normal rate.   Pulmonary:      Effort: Pulmonary effort is normal.   Musculoskeletal:         General: Swelling and tenderness present.      Left knee: She exhibits decreased range of motion, swelling, ecchymosis and bony tenderness (over patella). Tenderness found. Medial joint line and patellar tendon tenderness noted. No lateral joint line tenderness noted.        Legs:       Comments: Has two, two to three centimeter diameter of ecchymotic areas. No crepitus felt. Tenderness concentrated over the medial joint region and between this region and the patellar tendon. The kne is 2-3 + swollen. Pedal pulses 3+ popliteal pulse found with doppler per LPN.   Skin:     General: Skin is warm and dry.      Capillary Refill: Capillary refill takes less than 2 seconds.   Neurological:      Mental Status: She is alert and oriented to person, place, and time.   Psychiatric:         Behavior: Behavior normal.         ED Course        Procedures             Results for orders placed or performed during the hospital encounter of 06/15/20 (from the past 24 hour(s))   XR Knee Left 3 Views    Narrative    PROCEDURE:  XR KNEE LT 3 VW    HISTORY: injury    COMPARISON:  None.    TECHNIQUE:  3 views of the left knee were obtained.    FINDINGS:  No fracture or dislocation is identified. The joint spaces  are preserved.  There is no knee  effusion      Impression    IMPRESSION: No acute fracture.      ANIKET TAMAYO MD       Medications   ketorolac (TORADOL) injection 30 mg (30 mg Intramuscular Given 6/15/20 0943)       Assessments & Plan (with Medical Decision Making)     I have reviewed the nursing notes.    I have reviewed the findings, diagnosis, plan and need for follow up with the patient.  (S89.92XA) Knee injury, left, initial encounter  Comment: 34 year old female who slipped in the middle of the night because of water on her bathroom floor. She fell vertically onto her left knee. She now complains of left knee pain with the pain radiating up into her left upper thigh and groin. Her hip and her ankle are ok. The pain is worse with bending and straightening the knee. Denies previous problems or injuries. She has iced and elevated it and her last dose of ibuprofen 800 mg was this morning. Denies numbness and tingling into her foot. Denies fevers, chills, nausea, and vomiting.    Assessment:  Has two, two to three centimeter diameter of ecchymotic areas. No crepitus felt. Tenderness concentrated over the medial joint region and between this region and the patellar tendon. The kne is 2-3 + swollen. Pedal pulses 3+ popliteal pulse found with doppler per LPN.     Plan: tizanidine tid prn. Education provided and discussed for this/these medication. Refused narcotic pain medication.  Keep affected extremity elevated as much as possible for next 24 - 48 hours. Ice to affected area 20 minutes every hour as needed for comfort. After 48 hours you can apply heat. Ibuprofen 600 to 800 mg (3 - 4 tabs of over the counter med) every six to eight hours as needed;not to exceed maximum amount of 3200 mg in 24 hours.Tylenol 650 to 1000 mg every four to six hours as needed (not to exceed more than 4000 mg in a 24 hour period). May use interchangeably. Suggest medicating around the clock for the next 24-48 hours. Use ace wrap and knee immobilizer (has a friend's)  and crutches until you can walk on your knee without pain. Slowly start to wiggle your toes and move ankle and foot as often as possible but not beyond the point of pain. Follow up with primary provider or orthopedics as needed.  Orthopedic referral placed  These discharge instructions and medications were reviewed with her and understanding verbalized.    Discharge Medication List as of 6/15/2020  9:45 AM      START taking these medications    Details   tiZANidine (ZANAFLEX) 2 MG tablet Take 1 tablet (2 mg) by mouth 3 times daily as needed for muscle spasms, Disp-12 tablet,R-0, E-Prescribe             Final diagnoses:   Knee injury, left, initial encounter       6/15/2020   [facility]       Griselda Silva, CNP  06/17/20 0864

## 2020-06-15 NOTE — ED AVS SNAPSHOT
[facility]  [address] [address]  Phone:  [phone]                                    Vesna Fabian   MRN: [id number]    Department:  [facility]   Date of Visit:  6/15/2020           After Visit Summary Signature Page    I have received my discharge instructions, and my questions have been answered. I have discussed any challenges I see with this plan with the nurse or doctor.    ..........................................................................................................................................  Patient/Patient Representative Signature      ..........................................................................................................................................  Patient Representative Print Name and Relationship to Patient    ..................................................               ................................................  Date                                   Time    ..........................................................................................................................................  Reviewed by Signature/Title    ...................................................              ..............................................  Date                                               Time          22EPIC Rev 08/18

## 2020-06-15 NOTE — ED TRIAGE NOTES
Pt in for evaluation of left knee pain. Reports she fell on the knee and had a notable bruise to the area but was able to ambulate with some discomfort. Today she reports she awoke and is having more pain in the area and extreme weight bearing difficulty.

## 2020-06-15 NOTE — ED TRIAGE NOTES
Pt presents today with c/o fall Thursday night and now has left knee pain. States she is unable to straighten it, bend it, or put weight on it. Pt complains of shooting pains up thigh.

## 2020-06-16 NOTE — PROGRESS NOTES
"New Patient Visit:     Patient Profile: 34-year old, RHD, Former Smoker, [employer] Employee, Patient of Dixie Chun PA-C    Chief Complaint: Left Knee Injury    HPI: Patient presented to  on 6/15/2020 with CC of left knee pain. Four days prior patient fell onto her left knee after slipping on water. 6/10 rest pain and 9/10 at worst, radiated up thigh, medial side groin pain. Associated numbness over knee, hip and ankle throbbing. Bending and straightening provocative in nature. No relief with OTC modalities of treatment. X-rays normal. UC note is not finished to show A&P.     Subjective:   Location: Left Knee  Quality: \"stabbing, unbearable\"  Severity: 6/10  Duration: less than a week  Modifying Factors: worse with motion of any type, as well as at night and morning  Associated Signs and Symptoms: swelling, burning sensation    Review Of Systems  Skin: positive for lumps or bumps  Eyes: negative  Ears/Nose/Throat: negative  Respiratory: No shortness of breath, dyspnea on exertion, cough, or hemoptysis  Cardiovascular: negative  Gastrointestinal: negative  Genitourinary: negative  Musculoskeletal: positive for joint pain, joint swelling, joint stiffness and injury to left knee  Neurologic: positive for local weakness  Psychiatric: negative  Hematologic/Lymphatic/Immunologic: positive for allergies  Endocrine: negative    Prior Fracture Hx: none  Prior Surgical Hx: none    Allergies   Allergen Reactions     Dust Mites      House dust     Past Medical History:   Diagnosis Date     Attention deficit disorder without mention of hyperactivity 5/31/2000     Bipolar affective disorder 8/9/2012     Bulimia 8/9/2012     Self-injurious behavior 8/9/2012     Suicide attempt 8/9/2012     Past Surgical History:   Procedure Laterality Date     cyst ovary      LT     MAMMOPLASTY REDUCTION  2011     wisdom teeth removed       Current Outpatient Medications   Medication     lisdexamfetamine (VYVANSE) 60 MG capsule     " tiZANidine (ZANAFLEX) 2 MG tablet     No current facility-administered medications for this visit.      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 longest tobacco free 1 year. no passive exposure   Substance and Sexual Activity     Alcohol use: Yes     Comment: rarely     Drug use: No     Sexual activity: Not on file   Lifestyle     Physical activity     Days per week: Not on file     Minutes per session: Not on file     Stress: Not on file   Relationships     Social connections     Talks on phone: Not on file     Gets together: Not on file     Attends Christian service: Not on file     Active member of club or organization: Not on file     Attends meetings of clubs or organizations: Not on file     Relationship status: Not on file     Intimate partner violence     Fear of current or ex partner: Not on file     Emotionally abused: Not on file     Physically abused: Not on file     Forced sexual activity: Not on file   Other Topics Concern      Service Not Asked     Blood Transfusions Yes     Caffeine Concern No     Occupational Exposure Not Asked     Hobby Hazards Not Asked     Sleep Concern Not Asked     Stress Concern Not Asked     Weight Concern Not Asked     Special Diet Not Asked     Back Care Not Asked     Exercise Yes     Comment: 3 days week     Bike Helmet  Not Asked     Seat Belt Yes     Self-Exams Yes     Parent/sibling w/ CABG, MI or angioplasty before 65F 55M? Not Asked   Social History Narrative     Not on file      Family History   Problem Relation Age of Onset     Cancer Maternal Grandmother         pancreatic cancer; cause of death     Cancer Father         rectal     Colon Cancer Father      Heart Disease Paternal Grandmother         heart disease     Hypertension Mother      Objective: /89   Pulse 93   Temp 99.5  F (37.5  C)   Wt 86.2 kg (190 lb)   SpO2 97%   BMI 31.62 kg/m    Vesna is a pleasant, 34-year old. Head is normocephalic and atraumatic, sclerae are clear. Patient hears me normally, trachea midline, chest excursion is normal. Respiratory efforts are non-labored, all lung fields are clear to auscultation, there are no rales, rhonchi or wheezes. Cardiac: normal S1 and S2, regular rate/rhythm, there are no audible cardiac murmurs or gallops. Patient is alert and oriented and expresses proper mood and affect. Gait is slowed.     Appearance of the RIGHT knee: Skin is clean, dry, and non-ecchymotic. Effusion none. TTP none.  Patellar tracking is normal. ROM 0-115. Stable to varus, valgus, Lachman, A&P drawer ligamentous stressing. Strength - normal. Neurovascular status, distal pulses and sensation intact.    Appearance of the LEFT knee: Skin is clean, dry, and non-ecchymotic. Effusion large. TTP medial joint line posteriorly, patella.  Patellar tracking - guarding and will not tolerate. ROM 0-15. Stable to very gentle varus, valgus. Will not tolerate Lachman or A&P drawer ligamentous stressing. Billy unable to assess. Strength unable to assess. Neurovascular status, distal pulses and sensation intact.    Imaging: plain films of the knee obtained during recent  visit showed no fractures or dislocations, joint spaces preserved. Osteophyte off superior patella. No effusion.     Impression:   #1. Acute Left Knee Pain  #2. Mechanical Symptoms  of Left Knee    Plan: MRI of the knee ordered to rule out internal derangement. Continue with compression and use as tolerated. Crutches for weightbearing status if needed. Tramadol for pain control - short term Rx written.    Patient is in agreement and understanding of the above treatment plan. All questions and concerns were addressed and answered to patient's satisfaction. If questions/concerns arise before next visit they may contact the office at any point in time and we would be happy to assist them. Patient will follow up with orthopedics after MRI.     Sara Pichardo PA-C  Orthopedic Physician Assistant  [facility]

## 2020-06-17 ENCOUNTER — OFFICE VISIT (OUTPATIENT)
Dept: ORTHOPEDICS | Facility: OTHER | Age: 35
End: 2020-06-17
Attending: PHYSICIAN ASSISTANT
Payer: COMMERCIAL

## 2020-06-17 VITALS
BODY MASS INDEX: 31.62 KG/M2 | OXYGEN SATURATION: 97 % | TEMPERATURE: 99.5 F | HEART RATE: 93 BPM | DIASTOLIC BLOOD PRESSURE: 89 MMHG | WEIGHT: 190 LBS | SYSTOLIC BLOOD PRESSURE: 118 MMHG

## 2020-06-17 DIAGNOSIS — S89.92XA KNEE INJURY, LEFT, INITIAL ENCOUNTER: Primary | ICD-10-CM

## 2020-06-17 DIAGNOSIS — M25.869 SYMPTOM OF MECHANICAL DISORDER OF KNEE: ICD-10-CM

## 2020-06-17 PROCEDURE — 99203 OFFICE O/P NEW LOW 30 MIN: CPT | Performed by: PHYSICIAN ASSISTANT

## 2020-06-17 RX ORDER — TRAMADOL HYDROCHLORIDE 50 MG/1
50 TABLET ORAL EVERY 6 HOURS PRN
Qty: 15 TABLET | Refills: 0 | Status: SHIPPED | OUTPATIENT
Start: 2020-06-17 | End: 2020-07-15

## 2020-06-17 ASSESSMENT — ENCOUNTER SYMPTOMS
FEVER: 0
NAUSEA: 0
NUMBNESS: 0
VOMITING: 0
ACTIVITY CHANGE: 1
CHILLS: 0

## 2020-06-17 ASSESSMENT — PAIN SCALES - GENERAL: PAINLEVEL: MODERATE PAIN (5)

## 2020-06-17 NOTE — NURSING NOTE
"Chief Complaint   Patient presents with     Musculoskeletal Problem       Initial /89   Pulse 93   Temp 99.5  F (37.5  C)   Wt 86.2 kg (190 lb)   SpO2 97%   BMI 31.62 kg/m   Estimated body mass index is 31.62 kg/m  as calculated from the following:    Height as of 1/22/20: 1.651 m (5' 5\").    Weight as of this encounter: 86.2 kg (190 lb).  Medication Reconciliation: complete    Laurita Plascencia LPN    "

## 2020-06-18 ENCOUNTER — HOSPITAL ENCOUNTER (OUTPATIENT)
Dept: MRI IMAGING | Facility: HOSPITAL | Age: 35
Discharge: HOME | End: 2020-06-18
Attending: PHYSICIAN ASSISTANT | Admitting: PHYSICIAN ASSISTANT
Payer: COMMERCIAL

## 2020-06-18 DIAGNOSIS — M25.869 SYMPTOM OF MECHANICAL DISORDER OF KNEE: ICD-10-CM

## 2020-06-18 DIAGNOSIS — S89.92XA KNEE INJURY, LEFT, INITIAL ENCOUNTER: ICD-10-CM

## 2020-06-18 PROCEDURE — 73721 MRI JNT OF LWR EXTRE W/O DYE: CPT | Mod: TC,LT

## 2020-06-19 ENCOUNTER — TELEPHONE (OUTPATIENT)
Dept: ORTHOPEDICS | Facility: OTHER | Age: 35
End: 2020-06-19

## 2020-06-19 DIAGNOSIS — Z41.9 ELECTIVE SURGERY: Primary | ICD-10-CM

## 2020-06-19 NOTE — TELEPHONE ENCOUNTER
Called patient and left a voicemail to call clinic back in regards to results of left knee MRI.    Please route to me if patient calls back.    Thank you,     Sara Pichardo PA-C  Orthopedic Physician Assistant  [facility]

## 2020-06-19 NOTE — TELEPHONE ENCOUNTER
Patient called back in regards to MRI results. She will call us back either later this afternoon 6/19 or Monday 6/22 to confirm her surgery on 6/26/2020 as she needs to speak with her employer.     She follows up with myself on 6/23 at 815am to review her MRI pictures more in depth. We will also tentatively plan on preop and 72-hour prior COVID test AM of 6/23.     Sara Pichardo PA-C  Orthopedic Physician Assistant  [facility]

## 2020-06-22 ENCOUNTER — TELEPHONE (OUTPATIENT)
Dept: ORTHOPEDICS | Facility: OTHER | Age: 35
End: 2020-06-22

## 2020-06-22 NOTE — PROGRESS NOTES
"Return Visit:    Patient Profile: 34-year old, RHD, Former Smoker, [employer] Employee, Patient of Dixie Chun PA-C     Chief Complaint: Left Knee Injury    HPI: Patient presented to  on 6/15/2020 with CC of left knee pain. Four days prior patient fell onto her left knee after slipping on water. 6/10 rest pain and 9/10 at worst, radiated up thigh, medial side groin pain. Associated numbness over knee, hip and ankle throbbing. Bending and straightening provocative in nature. No relief with OTC modalities of treatment. X-rays normal.  note is not finished to show A&P.     6/17 - PE difficult to obtain due to pain and swelling, however, based off findings an MRI of the knee ordered to rule out internal derangement. Continue with compression and use as tolerated. Crutches for weightbearing status if needed. Tramadol for pain control - short term Rx written.    Subjective:   Location: LEFT Knee  Quality: stabbing at times, dull at others  Severity: 6/10  Modifying Factors: worse with bending, twisting. Better with rest, elevation and ice.  Associated Signs and Symptoms: numbness over anterior quad.    Objective: /74 (Cuff Size: Adult Regular)   Pulse 96   Temp 97.9  F (36.6  C) (Tympanic)   Ht 1.651 m (5' 5\")   Wt 86.2 kg (190 lb)   SpO2 99%   BMI 31.62 kg/m    Vesna is a pleasant, 34-year old. Head is normocephalic and atraumatic, sclerae are clear. Patient hears me normally, trachea midline, chest excursion is normal. Respiratory efforts are non-labored. Cardiac: there are no audible cardiac murmurs or gallops. Patient is alert and oriented and expresses proper mood and affect. Gait: limp to right side.     Right knee exam is normal. Left knee deferred per patient.     Imaging: MRI of the LEFT knee from 6/18/2020 showed a joint effusion and complex complete versus near complete full-thickness tear involving the patellar attachment of the patellofemoral ligament. As well as, partial-thickness tears " involving the retropatellar articular  Cartilage (apex). No fractures or dislocations noted. No bony edema. Ligamentous and meniscal structures intact.     Impression:   #1. Acute Left Knee Pain  #2. Patellofemoral Ligament Tear of Left Knee    Plan: Work status - continue as tolerated for now. Use crutches as needed. May use heat, ice and OTC analgesics PRN. Patient is in agreement and understanding of the above treatment plan. All questions and concerns were addressed and answered to patient's satisfaction. If questions/concerns arise before next visit they may contact the office at any point in time and we would be happy to assist them. Patient will follow up with orthopedics - will call patient with surgery date, preop and covid test.    Tramadol refilled for PM pain. Safe storage and taking reviewed.     Discussed anatomy, physiology and typical treatment plans for the above fracture pattern. Surgical intervention is recommended and will consist of: LEFT knee patellofemoral ligament repair (open). Patient will require a preoperative H&P, preoperative COVID test 72-hours prior to procedure and a two-week postoperative visit with the orthopedics department. All risks and benefits of surgical intervention were discussed at length with patient. Allergies review and preoperative orders (antibiotics, etc.). are to be placed.     Postoperative Pain Management Plan: will discuss at preop  Postoperative Nausea Plan: will discuss at preop (Zofran)  Pharmacy of Choice: [facility]    Sara Pichardo PA-C  Orthopedic Physician Assistant  [facility]

## 2020-06-22 NOTE — PROGRESS NOTES
"[facility] [place]  [address]  [place] [address] [phone]  Dept: [phone]    PRE-OP EVALUATION:  Today's date: 2020    Vesna Fabian (: 1985) presents for pre-operative evaluation assessment as requested by Dr. Perkins.  She requires evaluation and anesthesia risk assessment prior to undergoing surgery/procedure for treatment of Left Knee Patellofemoral Ligament Repair .    Proposed Surgery/ Procedure: Open Repair of Patellofemoral Ligament, Left Knee  Date of Surgery/ Procedure: 2020  Time of Surgery/ Procedure: [facility]  Hospital/Surgical Facility: [facility]  Primary Physician: Dixie Chun  Type of Anesthesia Anticipated: to be determined    Patient has a Health Care Directive or Living Will:  {YES/NO:686817::\"NO\"}    {PREOP QUESTIONNAIRE OPTIONS(by MA):605334::\"1. NO - Do you have a history of heart attack, stroke, stent, bypass or surgery on an artery in the head, neck, heart or legs?\",\"2. NO - Do you ever have any pain or discomfort in your chest?\",\"3. NO - Do you have a history of  Heart Failure?\",\"4. NO - Are you troubled by shortness of breath when: walking on the level, up a slight hill or at night?\",\"5. NO - Do you currently have a cold, bronchitis or other respiratory infection?\",\"6. NO - Do you have a cough, shortness of breath or wheezing?\",\"7. NO - Do you sometimes get pains in the calves of your legs when you walk?\",\"8. NO - Do you or anyone in your family have previous history of blood clots?\",\"9. NO - Do you or does anyone in your family have a serious bleeding problem such as prolonged bleeding following surgeries or cuts?\",\"10. NO - Have you ever had problems with anemia or been told to take iron pills?\",\"11. NO - Have you had any abnormal blood loss such as black, tarry or bloody stools, or abnormal vaginal bleeding?\",\"12. NO - Have you ever had a blood transfusion?\",\"13. NO - Have you or any of your relatives ever had problems " "with anesthesia?\",\"14. NO - Do you have sleep apnea, excessive snoring or daytime drowsiness?\",\"15. NO - Do you have any prosthetic heart valves?\",\"16. NO - Do you have prosthetic joints?\",\"17. NO - Is there any chance that you may be pregnant?\"}      HPI:     HPI related to upcoming procedure: Patient presented to  on 6/15/2020 with CC of left knee pain. Four days prior patient fell onto her left knee after slipping on water. 6/10 rest pain and 9/10 at worst, radiated up thigh, medial side groin pain. Associated numbness over knee, hip and ankle throbbing. Bending and straightening provocative in nature. No relief with OTC modalities of treatment. X-rays normal.  note is not finished to show A&P.     6/17 - PE difficult to obtain due to pain and swelling, however, based off findings an MRI of the knee ordered to rule out internal derangement. Continue with compression and use as tolerated. Crutches for weightbearing status if needed. Tramadol for pain control - short term Rx written.    6/23 - MRI reviewed, surgical versus non-operative treatment discussed.     Refer to orthopedics office notes if more information is needed.     See problem list for active medical problems.  Problems all longstanding and stable, except as noted/documented.  See ROS for pertinent symptoms related to these conditions.    MEDICAL HISTORY:     Patient Active Problem List    Diagnosis Date Noted     ACP (advance care planning) 03/10/2016     Priority: Medium     Advance Care Planning 3/10/2016: ACP Review of Chart / Resources Provided:  Reviewed chart for advance care plan.  Vesna STEPHY Fabian has been provided information and resources to begin or update their advance care plan.  Added by Kenisha Chilel             NO SHOW 06/18/2015     Priority: Medium     No shows for Dr. Valdez : 5/15/13; 4/29/15         ASCUS (atypical squamous cells of undetermined significance) on Pap smear 04/29/2014     Priority: Medium     Encounter for " routine gynecological examination 2014     Priority: Medium     Problem list name updated by automated process. Provider to review       Decreased libido 2014     Priority: Medium     Sensate focus       Fatigue 2014     Priority: Medium     Smoker 2014     Priority: Medium     Ready to quit. Only 3 cigs in a week       Family planning 2013     Priority: Medium     Moderate major depression (H) 2013     Priority: Medium     Denies suicidal or homicidal ideations at this time. Working with RAÚL Molina       Anxiety 2013     Priority: Medium     Chronic fatigue 2013     Priority: Medium      Past Medical History:   Diagnosis Date     Attention deficit disorder without mention of hyperactivity 2000     Bipolar affective disorder 2012     Bulimia 2012     Self-injurious behavior 2012     Suicide attempt 2012     Past Surgical History:   Procedure Laterality Date     cyst ovary      LT     MAMMOPLASTY REDUCTION       wisdom teeth removed       Current Outpatient Medications   Medication Sig Dispense Refill     lisdexamfetamine (VYVANSE) 60 MG capsule Take 1 capsule (60 mg) by mouth daily 30 capsule 0     tiZANidine (ZANAFLEX) 2 MG tablet Take 1 tablet (2 mg) by mouth 3 times daily as needed for muscle spasms 12 tablet 0     OTC products: {OTC ANALGESICS:092395}    Allergies   Allergen Reactions     Dust Mites      House dust      Latex Allergy: NO    Social History     Tobacco Use     Smoking status: Former Smoker     Years: 10.00     Types: Cigarettes     Last attempt to quit: 2020     Years since quittin.4     Smokeless tobacco: Never Used     Tobacco comment: quit . longest tobacco free 1 year. no passive exposure   Substance Use Topics     Alcohol use: Yes     Comment: rarely     History   Drug Use No       REVIEW OF SYSTEMS:   CONSTITUTIONAL: NEGATIVE for fever, chills, change in weight  INTEGUMENTARY/SKIN: NEGATIVE for worrisome  rashes, moles or lesions  EYES: NEGATIVE for vision changes or irritation  ENT/MOUTH: NEGATIVE for ear, mouth and throat problems  RESP: NEGATIVE for significant cough or SOB  BREAST: NEGATIVE for masses, tenderness or discharge  CV: NEGATIVE for chest pain, palpitations or peripheral edema  GI: NEGATIVE for nausea, abdominal pain, heartburn, or change in bowel habits  : NEGATIVE for frequency, dysuria, or hematuria  MUSCULOSKELETAL:POSITIVE  for injury, joint instability, joint pain and joint stiffness of LEFT Knee  NEURO: POSITIVE for weakness of LEFT knee, due to injury  ENDOCRINE: NEGATIVE for temperature intolerance, skin/hair changes  HEME: NEGATIVE for bleeding problems  PSYCHIATRIC: NEGATIVE for changes in mood or affect    EXAM:   There were no vitals taken for this visit.  {EXAM Preop Choices:281869}    DIAGNOSTICS:   {DIAGNOSTIC FOR PREOP:746296}    Recent Labs   Lab Test 03/09/18  0709 04/21/14  1113   HGB 15.0 14.9    380     --    POTASSIUM 4.6  --    CR 0.76  --         IMPRESSION:   Reason for surgery/procedure: Patellofemoral Ligament Repair of Left Knee  Diagnosis/reason for consult: Preoperative H&P, clearance for surgery    The proposed surgical procedure is considered INTERMEDIATE risk.    REVISED CARDIAC RISK INDEX  The patient has the following serious cardiovascular risks for perioperative complications such as (MI, PE, VFib and 3  AV Block):  No serious cardiac risks  INTERPRETATION: 1 risks: Class II (low risk - 0.9% complication rate)    The patient has the following additional risks for perioperative complications:  No identified additional risks      ICD-10-CM    1. Elective surgery  Z41.9 CBC with platelets and differential     Comprehensive metabolic panel (BMP + Alb, Alk Phos, ALT, AST, Total. Bili, TP)     HCG Qual, Urine ([id number])   2. Preop general physical exam  Z01.818        RECOMMENDATIONS:     --Patient is to take all scheduled medications on the day of surgery  "EXCEPT for modifications listed below. Hold NSAIDs, otherwise, patient on no chronic/long-term medications.     {IMPORTANT - Approval:[id number]:p:\"APPROVAL GIVEN to proceed with proposed procedure, without further diagnostic evaluation\"}       Signed Electronically by: Sara Pichardo PA-C    Copy of this evaluation report is provided to requesting physician.    [facility] Preop Guidelines    Revised Cardiac Risk Index  "

## 2020-06-23 ENCOUNTER — TELEPHONE (OUTPATIENT)
Dept: ORTHOPEDICS | Facility: OTHER | Age: 35
End: 2020-06-23

## 2020-06-23 ENCOUNTER — OFFICE VISIT (OUTPATIENT)
Dept: ORTHOPEDICS | Facility: OTHER | Age: 35
End: 2020-06-23
Attending: PHYSICIAN ASSISTANT
Payer: COMMERCIAL

## 2020-06-23 VITALS
WEIGHT: 190 LBS | HEIGHT: 65 IN | BODY MASS INDEX: 31.65 KG/M2 | DIASTOLIC BLOOD PRESSURE: 74 MMHG | SYSTOLIC BLOOD PRESSURE: 116 MMHG | HEART RATE: 96 BPM | TEMPERATURE: 97.9 F | OXYGEN SATURATION: 99 %

## 2020-06-23 DIAGNOSIS — Z41.9 ELECTIVE SURGERY: Primary | ICD-10-CM

## 2020-06-23 DIAGNOSIS — Z01.818 PREOP TESTING: ICD-10-CM

## 2020-06-23 DIAGNOSIS — M25.869 SYMPTOM OF MECHANICAL DISORDER OF KNEE: Primary | ICD-10-CM

## 2020-06-23 DIAGNOSIS — S89.92XD KNEE INJURY, LEFT, SUBSEQUENT ENCOUNTER: ICD-10-CM

## 2020-06-23 PROCEDURE — 99213 OFFICE O/P EST LOW 20 MIN: CPT | Performed by: PHYSICIAN ASSISTANT

## 2020-06-23 RX ORDER — TRAMADOL HYDROCHLORIDE 50 MG/1
50 TABLET ORAL EVERY 6 HOURS PRN
Qty: 10 TABLET | Refills: 0 | Status: SHIPPED | OUTPATIENT
Start: 2020-06-23 | End: 2020-07-15

## 2020-06-23 ASSESSMENT — MIFFLIN-ST. JEOR: SCORE: 1552.71

## 2020-06-23 ASSESSMENT — PAIN SCALES - GENERAL: PAINLEVEL: SEVERE PAIN (6)

## 2020-06-23 NOTE — TELEPHONE ENCOUNTER
Called patient to inform her of her surgery date.     Procedure: Left Patellofemoral Ligament Repair (Open)  Preop: [facility] 7/7 @ 1130  COVID Test: 7/7 @ 1030  Postop: [facility] 7/22 @ 1100    Patient expressed understanding and she was mailed copies of the above appointment times.     Sara Pichardo PA-C  Orthopedic Physician Assistant  [facility]

## 2020-06-23 NOTE — NURSING NOTE
"Chief Complaint   Patient presents with     Pre-Op Exam       Initial /74 (Cuff Size: Adult Regular)   Pulse 96   Temp 97.9  F (36.6  C) (Tympanic)   Ht 1.651 m (5' 5\")   Wt 86.2 kg (190 lb)   SpO2 99%   BMI 31.62 kg/m   Estimated body mass index is 31.62 kg/m  as calculated from the following:    Height as of this encounter: 1.651 m (5' 5\").    Weight as of this encounter: 86.2 kg (190 lb).  Medication Reconciliation: complete  Maribel Andrew LPN    "

## 2020-06-24 ENCOUNTER — PREP FOR PROCEDURE (OUTPATIENT)
Dept: ORTHOPEDICS | Facility: OTHER | Age: 35
End: 2020-06-24

## 2020-06-24 DIAGNOSIS — T14.8XXA LIGAMENT TEAR: Primary | ICD-10-CM

## 2020-06-24 DIAGNOSIS — Z41.9 ELECTIVE SURGERY: Primary | ICD-10-CM

## 2020-06-24 RX ORDER — CEFAZOLIN SODIUM 2 G/100ML
2 INJECTION, SOLUTION INTRAVENOUS
Status: CANCELLED | OUTPATIENT
Start: 2020-07-10

## 2020-06-24 NOTE — PROGRESS NOTES
Preop orders placed.     Sara Pichardo PA-C  Orthopedic Physician Assistant  [facility]

## 2020-06-29 NOTE — PROGRESS NOTES
[facility]  [address] [facility] [address] [phone]  Dept: [phone]    PRE-OP EVALUATION:  Today's date: 2020    Vesna Fabian (: 1985) presents for pre-operative evaluation assessment as requested by Dr. Perkins.  She requires evaluation and anesthesia risk assessment prior to undergoing surgery/procedure for treatment of Left Knee Medial Patellofemoral Ligament Repair .    Proposed Surgery/ Procedure: Open Left Knee Patellofemoral Ligament Repair  Date of Surgery/ Procedure: 7/10/2020  Time of Surgery/ Procedure: [facility]  Hospital/Surgical Facility: [facility]  Primary Physician: Dixie Chun  Type of Anesthesia Anticipated: Choice    Patient has a Health Care Directive or Living Will:  NO    1. NO - Do you have a history of heart attack, stroke, stent, bypass or surgery on an artery in the head, neck, heart or legs?  2. NO - Do you ever have any pain or discomfort in your chest?  3. NO - Do you have a history of  Heart Failure?  4. NO - Are you troubled by shortness of breath when: walking on the level, up a slight hill or at night?  5. NO - Do you currently have a cold, bronchitis or other respiratory infection?  6. NO - Do you have a cough, shortness of breath or wheezing?  7. NO - Do you sometimes get pains in the calves of your legs when you walk?  8. NO - Do you or anyone in your family have previous history of blood clots?  9. NO - Do you or does anyone in your family have a serious bleeding problem such as prolonged bleeding following surgeries or cuts?  10. NO - Have you ever had problems with anemia or been told to take iron pills?  11. NO - Have you had any abnormal blood loss such as black, tarry or bloody stools, or abnormal vaginal bleeding?  12. NO - Have you ever had a blood transfusion?  13. NO - Have you or any of your relatives ever had problems with anesthesia?  14. NO - Do you have sleep apnea, excessive snoring or daytime  drowsiness?  15. NO - Do you have any prosthetic heart valves?  16. NO - Do you have prosthetic joints?  17. NO - Is there any chance that you may be pregnant?      HPI:     HPI related to upcoming procedure: Patient presented to  on 6/15/2020 with CC of left knee pain. Four days prior patient fell onto her left knee after slipping on water. 6/10 rest pain and 9/10 at worst, radiated up thigh, medial side groin pain. Associated numbness over knee, hip and ankle throbbing. Bending and straightening provocative in nature. No relief with OTC modalities of treatment. X-rays normal.  note is not finished to show A&P.      6/17 - PE difficult to obtain due to pain and swelling, however, based off findings an MRI of the knee ordered to rule out internal derangement. Continue with compression and use as tolerated. Crutches for weightbearing status if needed. Tramadol for pain control - short term Rx written.    MRI of the LEFT knee from 6/18/2020 showed a joint effusion and complex complete versus near complete full-thickness tear involving the patellar attachment of the patellofemoral ligament. As well as, partial-thickness tears involving the retropatellar articular Cartilage (apex). No fractures or dislocations noted. No bony edema. Ligamentous and meniscal structures intact.     Discussed tx plan - consisting of surgery - patient consented.     See problem list for active medical problems.  Problems all longstanding and stable, except as noted/documented.  See ROS for pertinent symptoms related to these conditions.      MEDICAL HISTORY:     Patient Active Problem List    Diagnosis Date Noted     Ligament tear 06/24/2020     Priority: Medium     Added automatically from request for surgery [id number]       ACP (advance care planning) 03/10/2016     Priority: Medium     Advance Care Planning 3/10/2016: ACP Review of Chart / Resources Provided:  Reviewed chart for advance care plan.  Vesna Fabian has been provided  information and resources to begin or update their advance care plan.  Added by Kenisha Chilel             NO SHOW 2015     Priority: Medium     No shows for Dr. Valdez : 5/15/13; 4/29/15         ASCUS (atypical squamous cells of undetermined significance) on Pap smear 2014     Priority: Medium     Encounter for routine gynecological examination 2014     Priority: Medium     Problem list name updated by automated process. Provider to review       Decreased libido 2014     Priority: Medium     Sensate focus       Fatigue 2014     Priority: Medium     Smoker 2014     Priority: Medium     Ready to quit. Only 3 cigs in a week       Family planning 2013     Priority: Medium     Moderate major depression (H) 2013     Priority: Medium     Denies suicidal or homicidal ideations at this time. Working with RAÚL Molina       Anxiety 2013     Priority: Medium     Chronic fatigue 2013     Priority: Medium      Past Medical History:   Diagnosis Date     Attention deficit disorder without mention of hyperactivity 2000     Bipolar affective disorder 2012     Bulimia 2012     Self-injurious behavior 2012     Suicide attempt 2012     Past Surgical History:   Procedure Laterality Date     cyst ovary      LT     MAMMOPLASTY REDUCTION       wisdom teeth removed       Current Outpatient Medications   Medication Sig Dispense Refill     lisdexamfetamine (VYVANSE) 60 MG capsule Take 1 capsule (60 mg) by mouth daily 30 capsule 0     tiZANidine (ZANAFLEX) 2 MG tablet Take 1 tablet (2 mg) by mouth 3 times daily as needed for muscle spasms 12 tablet 0     OTC products: None, except as noted above    Allergies   Allergen Reactions     Dust Mites      House dust      Latex Allergy: NO    Social History     Tobacco Use     Smoking status: Former Smoker     Years: 10.00     Types: Cigarettes     Last attempt to quit: 2020     Years since quittin.4      Smokeless tobacco: Never Used     Tobacco comment: quit 2011. longest tobacco free 1 year. no passive exposure   Substance Use Topics     Alcohol use: Yes     Comment: rarely     History   Drug Use No       REVIEW OF SYSTEMS:   CONSTITUTIONAL: NEGATIVE for fever, chills, change in weight  INTEGUMENTARY/SKIN: NEGATIVE for worrisome rashes, moles or lesions  EYES: NEGATIVE for vision changes or irritation  ENT/MOUTH: NEGATIVE for ear, mouth and throat problems  RESP: NEGATIVE for significant cough or SOB  BREAST: NEGATIVE for masses, tenderness or discharge  CV: NEGATIVE for chest pain, palpitations or peripheral edema  GI: NEGATIVE for nausea, abdominal pain, heartburn, or change in bowel habits  : NEGATIVE for frequency, dysuria, or hematuria  MUSCULOSKELETAL:POSITIVE  for injury and rupture to ligament of LEFT knee  NEURO: NEGATIVE for weakness, dizziness or paresthesias  ENDOCRINE: NEGATIVE for temperature intolerance, skin/hair changes  HEME: NEGATIVE for bleeding problems  PSYCHIATRIC: NEGATIVE for changes in mood or affect    EXAM:   There were no vitals taken for this visit.    GENERAL APPEARANCE: healthy, alert and no distress     EYES: EOMI, PERRL     HENT: ear canals and TM's normal and nose and mouth without ulcers or lesions     NECK: no adenopathy, no asymmetry, masses, or scars and thyroid normal to palpation     RESP: lungs clear to auscultation - no rales, rhonchi or wheezes     CV: regular rates and rhythm, normal S1 S2, no S3 or S4 and no murmur, click or rub     ABDOMEN:  soft, nontender, no HSM or masses and bowel sounds normal     MS: knee hinged brace (OTC) on left knee, TTP over medial joint line, MPFL and lateral joint line.     SKIN: no suspicious lesions or rashes     NEURO: Normal strength and tone, sensory exam grossly normal, mentation intact and speech normal     PSYCH: mentation appears normal. and affect normal/bright     LYMPHATICS: No cervical adenopathy    DIAGNOSTICS:     Labs  Resulted Today:   Results for orders placed or performed in visit on 07/07/20   HCG Qual, Urine ([id number])     Status: None   Result Value Ref Range    HCG Qual Urine Negative NEG^Negative   Comprehensive metabolic panel (BMP + Alb, Alk Phos, ALT, AST, Total. Bili, TP)     Status: Abnormal   Result Value Ref Range    Sodium 138 133 - 144 mmol/L    Potassium 3.6 3.4 - 5.3 mmol/L    Chloride 106 94 - 109 mmol/L    Carbon Dioxide 29 20 - 32 mmol/L    Anion Gap 3 3 - 14 mmol/L    Glucose 94 70 - 99 mg/dL    Urea Nitrogen 6 (L) 7 - 30 mg/dL    Creatinine 0.63 0.52 - 1.04 mg/dL    GFR Estimate >90 >60 mL/min/[1.73_m2]    GFR Estimate If Black >90 >60 mL/min/[1.73_m2]    Calcium 8.8 8.5 - 10.1 mg/dL    Bilirubin Total 0.5 0.2 - 1.3 mg/dL    Albumin 3.9 3.4 - 5.0 g/dL    Protein Total 8.0 6.8 - 8.8 g/dL    Alkaline Phosphatase 99 40 - 150 U/L    ALT 42 0 - 50 U/L    AST 20 0 - 45 U/L   CBC with platelets and differential     Status: None   Result Value Ref Range    WBC 7.0 4.0 - 11.0 10e9/L    RBC Count 5.13 3.8 - 5.2 10e12/L    Hemoglobin 14.3 11.7 - 15.7 g/dL    Hematocrit 43.8 35.0 - 47.0 %    MCV 85 78 - 100 fl    MCH 27.9 26.5 - 33.0 pg    MCHC 32.6 31.5 - 36.5 g/dL    RDW 13.4 10.0 - 15.0 %    Platelet Count 409 150 - 450 10e9/L    Diff Method Automated Method     % Neutrophils 65.3 %    % Lymphocytes 23.7 %    % Monocytes 6.0 %    % Eosinophils 2.9 %    % Basophils 0.9 %    % Immature Granulocytes 1.2 %    Nucleated RBCs 0 0 /100    Absolute Neutrophil 4.5 1.6 - 8.3 10e9/L    Absolute Lymphocytes 1.7 0.8 - 5.3 10e9/L    Absolute Monocytes 0.4 0.0 - 1.3 10e9/L    Absolute Eosinophils 0.2 0.0 - 0.7 10e9/L    Absolute Basophils 0.1 0.0 - 0.2 10e9/L    Abs Immature Granulocytes 0.1 0 - 0.4 10e9/L    Absolute Nucleated RBC 0.0      Recent Labs   Lab Test 03/09/18  0709 04/21/14  1113   HGB 15.0 14.9    380     --    POTASSIUM 4.6  --    CR 0.76  --       IMPRESSION:   Reason for surgery/procedure: Torn Medial  Patellofemoral Ligament  Diagnosis/reason for consult: Preoperative Clearance    The proposed surgical procedure is considered INTERMEDIATE risk.    REVISED CARDIAC RISK INDEX  The patient has the following serious cardiovascular risks for perioperative complications such as (MI, PE, VFib and 3  AV Block):  No serious cardiac risks  INTERPRETATION: 1 risks: Class II (low risk - 0.9% complication rate)    The patient has the following additional risks for perioperative complications:  No identified additional risks      ICD-10-CM    1. Elective surgery  Z41.9 CBC with platelets and differential     Comprehensive metabolic panel (BMP + Alb, Alk Phos, ALT, AST, Total. Bili, TP)     HCG Qual, Urine ([id number])   2. Preop general physical exam  Z01.818        RECOMMENDATIONS:     --Patient is to take all scheduled medications on the day of surgery EXCEPT for modifications listed below.    Approval given pending COVID test done on 7/7/2020.     Postoperative Pain Management Plan: Norco 5-325mg  Postoperative Nausea Plan: Zofran  Pharmacy of Choice: [facility]    APPROVAL GIVEN to proceed with proposed procedure, without further diagnostic evaluation       Signed Electronically by: Sara Pichardo PA-C    Copy of this evaluation report is provided to requesting physician.    [facility] Preop Guidelines    Revised Cardiac Risk Index

## 2020-07-06 ENCOUNTER — ANESTHESIA EVENT (OUTPATIENT)
Dept: SURGERY | Facility: HOSPITAL | Age: 35
End: 2020-07-06
Payer: COMMERCIAL

## 2020-07-06 NOTE — PROGRESS NOTES
Postoperative Visit:    Procedure: Repair of the left knee patellofemoral ligament on 7/10/2020 with Dr. Perkins    Patient Profile: 34-year old, RHD, Former Smoker, [employer], Patient of Dixie Chun PA-C     Chief Complaint: Left Knee     HPI: Patient presented to  on 6/15/2020 with CC of left knee pain. Four days prior patient fell onto her left knee after slipping on water. 6/10 rest pain and 9/10 at worst, radiated up thigh, medial side groin pain. Associated numbness over knee, hip and ankle throbbing. Bending and straightening provocative in nature. No relief with OTC modalities of treatment. X-rays normal.  note is not finished to show A&P.      6/17 - PE difficult to obtain due to pain and swelling, however, based off findings an MRI of the knee ordered to rule out internal derangement. Continue with compression and use as tolerated. Crutches for weightbearing status if needed. Tramadol for pain control - short term Rx written.    6/23 - Discussed anatomy, physiology and typical treatment plans for the above fracture pattern. Surgical intervention is recommended and will consist of: LEFT knee patellofemoral ligament repair (open). Patient will require a preoperative H&P, preoperative COVID test 72-hours prior to procedure and a two-week postoperative visit with the orthopedics department. All risks and benefits of surgical intervention were discussed at length with patient. Allergies review and preoperative orders (antibiotics, etc.). are to be placed.      Postoperative Pain Management Plan: will discuss at preop  Postoperative Nausea Plan: will discuss at preop (Zofran)  Pharmacy of Choice: 's [facility]    Subjective:   Location: LEFT Knee  Quality: stiff, sore, tingling  Severity: 6/10  Palliative: rest, ice  Provocative: walking  Associated Signs and Symptoms: tingling from nerve block - is slowly improving, but is still bothersome    Objective: /78   Pulse 74   Temp  97.5  F (36.4  C)   SpO2 99%   Vesna is a pleasant, 35-year old. Head is normocephalic and atraumatic, sclerae are clear. Patient hears me normally, trachea midline, chest excursion is normal. Respiratory efforts are non-labored. Cardiac: there are no audible cardiac murmurs or gallops. Patient is alert and oriented and expresses proper mood and affect. Gait - crutches.     Left Knee; incision sites are C/D/I. No effusion. TTP medial to incision. ROM 0-45 comfortably, but patient states she can get to 90. stable  to varus, valgus, Lachman, A&P drawer ligamentous stressing. Strength not assessed due to acute postoperative status. Neurovascular status, intact distal pulses 2+, light touch sensation intact.    Imaging; no new imaging    Impression:   #1. Postoperative State  #2. S/p Left Knee Surgery    Plan: Tingling and neuro symptoms should ware off as block subsides, possible that anesthesia came in close contact with nerve during surgical block and that this is the source of her symptoms, discussed that the nerve could be irritated and likely healing/settling down at a 1-1.5cm rate per day. Continue with crutches until feel stable to bear weight, can transition to one crutch then off crutches. Keep incision site clean, dry and covered. Work/School status - patient will drop off paperwork. PT referral to be placed (internal) at next visit. Patient may use heat, RICE protocol and OTC analgesics PRN for pain and swelling control. Patient is in agreement and understanding of the above treatment plan. All questions and concerns were addressed and answered to patient's satisfaction. If questions/concerns arise before next visit they may contact the office at any point in time and we would be happy to assist them. Patient will follow up with orthopedics 2-weeks.     Refill of Dilaudid 2mg PO Q4/Q618, # 18 (eighteen) PRN for pain control was written and sent through to pharmacy of patient's choice. Safe storage reviewed  with patient, as well as safe taking of medication as prescribed. All questions/concerns were answered.     Sara Pichardo PA-C  Orthopedic Physician Assistant  [facility]      Sara Pichardo PA-C  Orthopedic Physician Assistant  [facility]

## 2020-07-06 NOTE — ANESTHESIA PREPROCEDURE EVALUATION
Anesthesia Pre-Procedure Evaluation    Patient: Vesna Fabian   MRN: [id number] : 1985          Preoperative Diagnosis: Ligament tear [T14.8XXA]    Procedure(s):  Left knee patellorfemoral ligament repair    Past Medical History:   Diagnosis Date     Attention deficit disorder without mention of hyperactivity 2000     Bipolar affective disorder 2012     Bulimia 2012     Self-injurious behavior 2012     Suicide attempt 2012     Past Surgical History:   Procedure Laterality Date     cyst ovary      LT     MAMMOPLASTY REDUCTION       wisdom teeth removed         Anesthesia Evaluation     . Pt has had prior anesthetic.            ROS/MED HX    ENT/Pulmonary:     (+)tobacco use (quit 1 week ago), Past use , . .   (-) recent URI   Neurologic:       Cardiovascular:  - neg cardiovascular ROS       METS/Exercise Tolerance:     Hematologic:  - neg hematologic  ROS       Musculoskeletal:  - neg musculoskeletal ROS       GI/Hepatic:  - neg GI/hepatic ROS       Renal/Genitourinary:  - ROS Renal section negative       Endo:     (+) Obesity, .      Psychiatric:     (+) psychiatric history anxiety, other (comment), bipolar and depression (bulimia, suicide attempt, ADHD, chronic fatigue)      Infectious Disease:        (-) Recent Fever   Malignancy:      - no malignancy   Other: Comment: Negative urine pregnancy   (+) No chance of pregnancy   - neg other ROS                      Physical Exam  Normal systems: cardiovascular, pulmonary and dental    Airway   Mallampati: I  TM distance: >3 FB  Neck ROM: full    Dental     Cardiovascular   Rhythm and rate: regular and normal      Pulmonary             Lab Results   Component Value Date    WBC 7.4 2018    HGB 15.0 2018    HCT 43.2 2018     2018     2018    POTASSIUM 4.6 2018    CHLORIDE 104 2018    CO2 26 2018    BUN 12 2018    CR 0.76 2018    GLC 82 2018    EUSEBIO 8.9  "03/09/2018    ALBUMIN 4.3 03/09/2018    PROTTOTAL 7.8 03/09/2018    ALT 16 03/09/2018    AST 11 03/09/2018    ALKPHOS 65 03/09/2018    BILITOTAL 1.1 03/09/2018    LIPASE 72 (L) 03/09/2018    TSH 1.26 04/21/2014    HCGS Negative 08/13/2013       Preop Vitals  BP Readings from Last 3 Encounters:   06/23/20 116/74   06/17/20 118/89   06/15/20 118/70    Pulse Readings from Last 3 Encounters:   06/23/20 96   06/17/20 93   06/15/20 89      Resp Readings from Last 3 Encounters:   06/15/20 18   03/12/20 16   01/04/19 12    SpO2 Readings from Last 3 Encounters:   06/23/20 99%   06/17/20 97%   06/15/20 100%      Temp Readings from Last 1 Encounters:   06/23/20 97.9  F (36.6  C) (Tympanic)    Ht Readings from Last 1 Encounters:   06/23/20 1.651 m (5' 5\")      Wt Readings from Last 1 Encounters:   06/23/20 86.2 kg (190 lb)    Estimated body mass index is 31.62 kg/m  as calculated from the following:    Height as of 6/23/20: 1.651 m (5' 5\").    Weight as of 6/23/20: 86.2 kg (190 lb).       Anesthesia Plan      History & Physical Review  History and physical reviewed and following examination; no interval change.    ASA Status:  3 .    NPO Status:  > 8 hours    Plan for Peripheral Nerve Block and General with Intravenous and Propofol induction. Maintenance will be Inhalation.    PONV prophylaxis:  Ondansetron (or other 5HT-3) and Dexamethasone or Solumedrol  H and P 7/7/20        Postoperative Care  Postoperative pain management:  IV analgesics and Peripheral nerve block (Single Shot).      Consents  Anesthetic plan, risks, benefits and alternatives discussed with:  Patient.  Use of blood products discussed: Yes.   Use of blood products discussed with Patient.  Consented to blood products (verbal consent given).  .                 ZAFAR Huber CRNA  "

## 2020-07-07 ENCOUNTER — OFFICE VISIT (OUTPATIENT)
Dept: ORTHOPEDICS | Facility: OTHER | Age: 35
End: 2020-07-07
Attending: PHYSICIAN ASSISTANT
Payer: COMMERCIAL

## 2020-07-07 ENCOUNTER — OFFICE VISIT (OUTPATIENT)
Dept: FAMILY MEDICINE | Facility: OTHER | Age: 35
End: 2020-07-07
Attending: ORTHOPAEDIC SURGERY
Payer: COMMERCIAL

## 2020-07-07 VITALS
WEIGHT: 190 LBS | BODY MASS INDEX: 31.62 KG/M2 | DIASTOLIC BLOOD PRESSURE: 80 MMHG | HEART RATE: 77 BPM | OXYGEN SATURATION: 98 % | SYSTOLIC BLOOD PRESSURE: 118 MMHG | TEMPERATURE: 98.4 F

## 2020-07-07 DIAGNOSIS — Z41.9 ELECTIVE SURGERY: ICD-10-CM

## 2020-07-07 DIAGNOSIS — M25.869 SYMPTOM OF MECHANICAL DISORDER OF KNEE: ICD-10-CM

## 2020-07-07 DIAGNOSIS — Z41.9 ELECTIVE SURGERY: Primary | ICD-10-CM

## 2020-07-07 DIAGNOSIS — Z01.818 PREOP TESTING: ICD-10-CM

## 2020-07-07 DIAGNOSIS — T14.8XXA LIGAMENT TEAR: ICD-10-CM

## 2020-07-07 DIAGNOSIS — Z01.818 PREOP GENERAL PHYSICAL EXAM: ICD-10-CM

## 2020-07-07 LAB
ALBUMIN SERPL-MCNC: 3.9 G/DL (ref 3.4–5)
ALP SERPL-CCNC: 99 U/L (ref 40–150)
ALT SERPL W P-5'-P-CCNC: 42 U/L (ref 0–50)
ANION GAP SERPL CALCULATED.3IONS-SCNC: 3 MMOL/L (ref 3–14)
AST SERPL W P-5'-P-CCNC: 20 U/L (ref 0–45)
BASOPHILS # BLD AUTO: 0.1 10E9/L (ref 0–0.2)
BASOPHILS NFR BLD AUTO: 0.9 %
BILIRUB SERPL-MCNC: 0.5 MG/DL (ref 0.2–1.3)
BUN SERPL-MCNC: 6 MG/DL (ref 7–30)
CALCIUM SERPL-MCNC: 8.8 MG/DL (ref 8.5–10.1)
CHLORIDE SERPL-SCNC: 106 MMOL/L (ref 94–109)
CO2 SERPL-SCNC: 29 MMOL/L (ref 20–32)
CREAT SERPL-MCNC: 0.63 MG/DL (ref 0.52–1.04)
DIFFERENTIAL METHOD BLD: NORMAL
EOSINOPHIL # BLD AUTO: 0.2 10E9/L (ref 0–0.7)
EOSINOPHIL NFR BLD AUTO: 2.9 %
ERYTHROCYTE [DISTWIDTH] IN BLOOD BY AUTOMATED COUNT: 13.4 % (ref 10–15)
GFR SERPL CREATININE-BSD FRML MDRD: >90 ML/MIN/{1.73_M2}
GLUCOSE SERPL-MCNC: 94 MG/DL (ref 70–99)
HCG UR QL: NEGATIVE
HCT VFR BLD AUTO: 43.8 % (ref 35–47)
HGB BLD-MCNC: 14.3 G/DL (ref 11.7–15.7)
IMM GRANULOCYTES # BLD: 0.1 10E9/L (ref 0–0.4)
IMM GRANULOCYTES NFR BLD: 1.2 %
LYMPHOCYTES # BLD AUTO: 1.7 10E9/L (ref 0.8–5.3)
LYMPHOCYTES NFR BLD AUTO: 23.7 %
MCH RBC QN AUTO: 27.9 PG (ref 26.5–33)
MCHC RBC AUTO-ENTMCNC: 32.6 G/DL (ref 31.5–36.5)
MCV RBC AUTO: 85 FL (ref 78–100)
MONOCYTES # BLD AUTO: 0.4 10E9/L (ref 0–1.3)
MONOCYTES NFR BLD AUTO: 6 %
NEUTROPHILS # BLD AUTO: 4.5 10E9/L (ref 1.6–8.3)
NEUTROPHILS NFR BLD AUTO: 65.3 %
NRBC # BLD AUTO: 0 10*3/UL
NRBC BLD AUTO-RTO: 0 /100
PLATELET # BLD AUTO: 409 10E9/L (ref 150–450)
POTASSIUM SERPL-SCNC: 3.6 MMOL/L (ref 3.4–5.3)
PROT SERPL-MCNC: 8 G/DL (ref 6.8–8.8)
RBC # BLD AUTO: 5.13 10E12/L (ref 3.8–5.2)
SODIUM SERPL-SCNC: 138 MMOL/L (ref 133–144)
WBC # BLD AUTO: 7 10E9/L (ref 4–11)

## 2020-07-07 PROCEDURE — 81025 URINE PREGNANCY TEST: CPT | Performed by: PHYSICIAN ASSISTANT

## 2020-07-07 PROCEDURE — 99213 OFFICE O/P EST LOW 20 MIN: CPT | Performed by: PHYSICIAN ASSISTANT

## 2020-07-07 PROCEDURE — 80053 COMPREHEN METABOLIC PANEL: CPT | Performed by: PHYSICIAN ASSISTANT

## 2020-07-07 PROCEDURE — U0003 INFECTIOUS AGENT DETECTION BY NUCLEIC ACID (DNA OR RNA); SEVERE ACUTE RESPIRATORY SYNDROME CORONAVIRUS 2 (SARS-COV-2) (CORONAVIRUS DISEASE [COVID-19]), AMPLIFIED PROBE TECHNIQUE, MAKING USE OF HIGH THROUGHPUT TECHNOLOGIES AS DESCRIBED BY CMS-2020-01-R: HCPCS | Performed by: PHYSICIAN ASSISTANT

## 2020-07-07 PROCEDURE — 36415 COLL VENOUS BLD VENIPUNCTURE: CPT | Performed by: PHYSICIAN ASSISTANT

## 2020-07-07 PROCEDURE — 85025 COMPLETE CBC W/AUTO DIFF WBC: CPT | Performed by: PHYSICIAN ASSISTANT

## 2020-07-07 RX ORDER — HYDROCODONE BITARTRATE AND ACETAMINOPHEN 5; 325 MG/1; MG/1
1 TABLET ORAL EVERY 4 HOURS PRN
Qty: 8 TABLET | Refills: 0 | Status: SHIPPED | OUTPATIENT
Start: 2020-07-07 | End: 2020-07-15

## 2020-07-07 ASSESSMENT — PAIN SCALES - GENERAL: PAINLEVEL: SEVERE PAIN (6)

## 2020-07-07 NOTE — PROGRESS NOTES
Refill of Norco 5-325 mg PO Q4, # 8 (eight) PRN for pain control was written and sent through to pharmacy of patient's choice. Safe storage reviewed with patient, as well as safe taking of medication as prescribed. All questions/concerns were answered.     Sara Pichardo PA-C  Orthopedic Physician Assistant  [facility]

## 2020-07-07 NOTE — NURSING NOTE
"Chief Complaint   Patient presents with     Pre-Op Exam       Initial /80   Pulse 77   Temp 98.4  F (36.9  C)   Wt 86.2 kg (190 lb)   SpO2 98%   BMI 31.62 kg/m   Estimated body mass index is 31.62 kg/m  as calculated from the following:    Height as of 6/23/20: 1.651 m (5' 5\").    Weight as of this encounter: 86.2 kg (190 lb).  Medication Reconciliation: complete    Laurita Plascencia LPN    "

## 2020-07-08 LAB
SARS-COV-2 RNA SPEC QL NAA+PROBE: NOT DETECTED
SPECIMEN SOURCE: NORMAL

## 2020-07-09 NOTE — TELEPHONE ENCOUNTER
Pt called and stated that surgery on 6/26 would be too soon to do as she has many things to ameya up before shes out. Will come in for appt to discuss and schedule at a later time.     Laurita Plascencia LPN    Junctional beats on CPAP appears to have resolved.   -No plans for PPM at this time

## 2020-07-10 ENCOUNTER — APPOINTMENT (OUTPATIENT)
Dept: LAB | Facility: HOSPITAL | Age: 35
End: 2020-07-10
Attending: PHYSICIAN ASSISTANT
Payer: COMMERCIAL

## 2020-07-10 ENCOUNTER — HOSPITAL ENCOUNTER (OUTPATIENT)
Facility: HOSPITAL | Age: 35
Discharge: HOME | End: 2020-07-10
Attending: ORTHOPAEDIC SURGERY | Admitting: ORTHOPAEDIC SURGERY
Payer: COMMERCIAL

## 2020-07-10 ENCOUNTER — DOCUMENTATION ONLY (OUTPATIENT)
Dept: ORTHOPEDICS | Facility: OTHER | Age: 35
End: 2020-07-10

## 2020-07-10 ENCOUNTER — ANESTHESIA (OUTPATIENT)
Dept: SURGERY | Facility: HOSPITAL | Age: 35
End: 2020-07-10
Payer: COMMERCIAL

## 2020-07-10 VITALS
HEIGHT: 65 IN | OXYGEN SATURATION: 99 % | RESPIRATION RATE: 16 BRPM | HEART RATE: 75 BPM | BODY MASS INDEX: 31.82 KG/M2 | DIASTOLIC BLOOD PRESSURE: 88 MMHG | TEMPERATURE: 97.2 F | WEIGHT: 191 LBS | SYSTOLIC BLOOD PRESSURE: 118 MMHG

## 2020-07-10 DIAGNOSIS — T14.8XXA LIGAMENT TEAR: ICD-10-CM

## 2020-07-10 DIAGNOSIS — Z98.890 S/P LEFT KNEE SURGERY: Primary | ICD-10-CM

## 2020-07-10 DIAGNOSIS — Z41.9 ELECTIVE SURGERY: ICD-10-CM

## 2020-07-10 LAB — HCG UR QL: NEGATIVE

## 2020-07-10 PROCEDURE — 27110028 ZZH OR GENERAL SUPPLY NON-STERILE: Performed by: ORTHOPAEDIC SURGERY

## 2020-07-10 PROCEDURE — 37000009 ZZH ANESTHESIA TECHNICAL FEE, EACH ADDTL 15 MIN: Performed by: ORTHOPAEDIC SURGERY

## 2020-07-10 PROCEDURE — 37000008 ZZH ANESTHESIA TECHNICAL FEE, 1ST 30 MIN: Performed by: ORTHOPAEDIC SURGERY

## 2020-07-10 PROCEDURE — 81025 URINE PREGNANCY TEST: CPT | Performed by: NURSE ANESTHETIST, CERTIFIED REGISTERED

## 2020-07-10 PROCEDURE — 40000305 ZZH STATISTIC PRE PROC ASSESS I: Performed by: ORTHOPAEDIC SURGERY

## 2020-07-10 PROCEDURE — 76942 ECHO GUIDE FOR BIOPSY: CPT | Mod: 26 | Performed by: NURSE ANESTHETIST, CERTIFIED REGISTERED

## 2020-07-10 PROCEDURE — 71000014 ZZH RECOVERY PHASE 1 LEVEL 2 FIRST HR: Performed by: ORTHOPAEDIC SURGERY

## 2020-07-10 PROCEDURE — 27427 RECONSTRUCTION KNEE: CPT | Performed by: NURSE ANESTHETIST, CERTIFIED REGISTERED

## 2020-07-10 PROCEDURE — 27566 TREAT KNEECAP DISLOCATION: CPT | Mod: LT | Performed by: ORTHOPAEDIC SURGERY

## 2020-07-10 PROCEDURE — 64447 NJX AA&/STRD FEMORAL NRV IMG: CPT | Mod: XU | Performed by: NURSE ANESTHETIST, CERTIFIED REGISTERED

## 2020-07-10 PROCEDURE — 25800030 ZZH RX IP 258 OP 636: Performed by: NURSE ANESTHETIST, CERTIFIED REGISTERED

## 2020-07-10 PROCEDURE — 25000125 ZZHC RX 250: Performed by: NURSE ANESTHETIST, CERTIFIED REGISTERED

## 2020-07-10 PROCEDURE — 27566 TREAT KNEECAP DISLOCATION: CPT | Mod: AS | Performed by: PHYSICIAN ASSISTANT

## 2020-07-10 PROCEDURE — 25000128 H RX IP 250 OP 636: Performed by: PHYSICIAN ASSISTANT

## 2020-07-10 PROCEDURE — 25000128 H RX IP 250 OP 636: Performed by: NURSE ANESTHETIST, CERTIFIED REGISTERED

## 2020-07-10 PROCEDURE — 27210794 ZZH OR GENERAL SUPPLY STERILE: Performed by: ORTHOPAEDIC SURGERY

## 2020-07-10 PROCEDURE — 36000093 ZZH SURGERY LEVEL 4 1ST 30 MIN: Performed by: ORTHOPAEDIC SURGERY

## 2020-07-10 PROCEDURE — 25000566 ZZH SEVOFLURANE, EA 15 MIN: Performed by: NURSE ANESTHETIST, CERTIFIED REGISTERED

## 2020-07-10 PROCEDURE — 71000027 ZZH RECOVERY PHASE 2 EACH 15 MINS: Performed by: ORTHOPAEDIC SURGERY

## 2020-07-10 PROCEDURE — 36000063 ZZH SURGERY LEVEL 4 EA 15 ADDTL MIN: Performed by: ORTHOPAEDIC SURGERY

## 2020-07-10 RX ORDER — HYDROMORPHONE HYDROCHLORIDE 1 MG/ML
.3-.5 INJECTION, SOLUTION INTRAMUSCULAR; INTRAVENOUS; SUBCUTANEOUS EVERY 10 MIN PRN
Status: DISCONTINUED | OUTPATIENT
Start: 2020-07-10 | End: 2020-07-10 | Stop reason: HOSPADM

## 2020-07-10 RX ORDER — EPHEDRINE SULFATE 50 MG/ML
INJECTION, SOLUTION INTRAMUSCULAR; INTRAVENOUS; SUBCUTANEOUS PRN
Status: DISCONTINUED | OUTPATIENT
Start: 2020-07-10 | End: 2020-07-10

## 2020-07-10 RX ORDER — SODIUM CHLORIDE, SODIUM LACTATE, POTASSIUM CHLORIDE, CALCIUM CHLORIDE 600; 310; 30; 20 MG/100ML; MG/100ML; MG/100ML; MG/100ML
INJECTION, SOLUTION INTRAVENOUS CONTINUOUS
Status: DISCONTINUED | OUTPATIENT
Start: 2020-07-10 | End: 2020-07-10 | Stop reason: HOSPADM

## 2020-07-10 RX ORDER — MEPERIDINE HYDROCHLORIDE 25 MG/ML
12.5 INJECTION INTRAMUSCULAR; INTRAVENOUS; SUBCUTANEOUS
Status: DISCONTINUED | OUTPATIENT
Start: 2020-07-10 | End: 2020-07-10 | Stop reason: HOSPADM

## 2020-07-10 RX ORDER — OXYCODONE AND ACETAMINOPHEN 5; 325 MG/1; MG/1
1 TABLET ORAL
Status: DISCONTINUED | OUTPATIENT
Start: 2020-07-10 | End: 2020-07-10 | Stop reason: HOSPADM

## 2020-07-10 RX ORDER — ONDANSETRON 4 MG/1
4 TABLET, ORALLY DISINTEGRATING ORAL
Status: DISCONTINUED | OUTPATIENT
Start: 2020-07-10 | End: 2020-07-10 | Stop reason: HOSPADM

## 2020-07-10 RX ORDER — ONDANSETRON 2 MG/ML
INJECTION INTRAMUSCULAR; INTRAVENOUS PRN
Status: DISCONTINUED | OUTPATIENT
Start: 2020-07-10 | End: 2020-07-10

## 2020-07-10 RX ORDER — NALOXONE HYDROCHLORIDE 0.4 MG/ML
.1-.4 INJECTION, SOLUTION INTRAMUSCULAR; INTRAVENOUS; SUBCUTANEOUS
Status: DISCONTINUED | OUTPATIENT
Start: 2020-07-10 | End: 2020-07-10 | Stop reason: HOSPADM

## 2020-07-10 RX ORDER — LIDOCAINE HYDROCHLORIDE 20 MG/ML
INJECTION, SOLUTION INFILTRATION; PERINEURAL PRN
Status: DISCONTINUED | OUTPATIENT
Start: 2020-07-10 | End: 2020-07-10

## 2020-07-10 RX ORDER — DEXAMETHASONE SODIUM PHOSPHATE 10 MG/ML
INJECTION, SOLUTION INTRAMUSCULAR; INTRAVENOUS PRN
Status: DISCONTINUED | OUTPATIENT
Start: 2020-07-10 | End: 2020-07-10

## 2020-07-10 RX ORDER — PROPOFOL 10 MG/ML
INJECTION, EMULSION INTRAVENOUS PRN
Status: DISCONTINUED | OUTPATIENT
Start: 2020-07-10 | End: 2020-07-10

## 2020-07-10 RX ORDER — OXYCODONE AND ACETAMINOPHEN 5; 325 MG/1; MG/1
1-2 TABLET ORAL EVERY 4 HOURS PRN
Qty: 18 TABLET | Refills: 0 | Status: SHIPPED | OUTPATIENT
Start: 2020-07-10 | End: 2020-07-13

## 2020-07-10 RX ORDER — CEFAZOLIN SODIUM 1 G/3ML
1 INJECTION, POWDER, FOR SOLUTION INTRAMUSCULAR; INTRAVENOUS SEE ADMIN INSTRUCTIONS
Status: DISCONTINUED | OUTPATIENT
Start: 2020-07-10 | End: 2020-07-10 | Stop reason: HOSPADM

## 2020-07-10 RX ORDER — ONDANSETRON 4 MG/1
4 TABLET, ORALLY DISINTEGRATING ORAL EVERY 8 HOURS PRN
Qty: 20 TABLET | Refills: 0 | Status: SHIPPED | OUTPATIENT
Start: 2020-07-10 | End: 2020-12-09

## 2020-07-10 RX ORDER — ALBUTEROL SULFATE 0.83 MG/ML
2.5 SOLUTION RESPIRATORY (INHALATION) EVERY 4 HOURS PRN
Status: DISCONTINUED | OUTPATIENT
Start: 2020-07-10 | End: 2020-07-10 | Stop reason: HOSPADM

## 2020-07-10 RX ORDER — FENTANYL CITRATE 50 UG/ML
25-50 INJECTION, SOLUTION INTRAMUSCULAR; INTRAVENOUS
Status: DISCONTINUED | OUTPATIENT
Start: 2020-07-10 | End: 2020-07-10 | Stop reason: HOSPADM

## 2020-07-10 RX ORDER — ONDANSETRON 2 MG/ML
4 INJECTION INTRAMUSCULAR; INTRAVENOUS EVERY 30 MIN PRN
Status: DISCONTINUED | OUTPATIENT
Start: 2020-07-10 | End: 2020-07-10 | Stop reason: HOSPADM

## 2020-07-10 RX ORDER — FENTANYL CITRATE 50 UG/ML
INJECTION, SOLUTION INTRAMUSCULAR; INTRAVENOUS PRN
Status: DISCONTINUED | OUTPATIENT
Start: 2020-07-10 | End: 2020-07-10

## 2020-07-10 RX ORDER — ONDANSETRON 4 MG/1
4 TABLET, ORALLY DISINTEGRATING ORAL EVERY 30 MIN PRN
Status: DISCONTINUED | OUTPATIENT
Start: 2020-07-10 | End: 2020-07-10 | Stop reason: HOSPADM

## 2020-07-10 RX ORDER — CEFAZOLIN SODIUM 2 G/100ML
2 INJECTION, SOLUTION INTRAVENOUS
Status: COMPLETED | OUTPATIENT
Start: 2020-07-10 | End: 2020-07-10

## 2020-07-10 RX ORDER — LIDOCAINE 40 MG/G
CREAM TOPICAL
Status: DISCONTINUED | OUTPATIENT
Start: 2020-07-10 | End: 2020-07-10 | Stop reason: HOSPADM

## 2020-07-10 RX ORDER — ROPIVACAINE HYDROCHLORIDE 5 MG/ML
INJECTION, SOLUTION EPIDURAL; INFILTRATION; PERINEURAL PRN
Status: DISCONTINUED | OUTPATIENT
Start: 2020-07-10 | End: 2020-07-10

## 2020-07-10 RX ADMIN — ONDANSETRON 4 MG: 2 INJECTION INTRAMUSCULAR; INTRAVENOUS at 08:53

## 2020-07-10 RX ADMIN — ROPIVACAINE HYDROCHLORIDE 30 ML: 5 INJECTION, SOLUTION EPIDURAL; INFILTRATION; PERINEURAL at 07:44

## 2020-07-10 RX ADMIN — MIDAZOLAM 2 MG: 1 INJECTION INTRAMUSCULAR; INTRAVENOUS at 08:10

## 2020-07-10 RX ADMIN — DEXAMETHASONE SODIUM PHOSPHATE 10 MG: 10 INJECTION, SOLUTION INTRAMUSCULAR; INTRAVENOUS at 07:44

## 2020-07-10 RX ADMIN — Medication 10 MG: at 08:43

## 2020-07-10 RX ADMIN — CEFAZOLIN SODIUM 2 G: 2 INJECTION, SOLUTION INTRAVENOUS at 08:14

## 2020-07-10 RX ADMIN — DEXAMETHASONE SODIUM PHOSPHATE 10 MG: 10 INJECTION, SOLUTION INTRAMUSCULAR; INTRAVENOUS at 08:22

## 2020-07-10 RX ADMIN — LIDOCAINE HYDROCHLORIDE 80 MG: 20 INJECTION, SOLUTION INFILTRATION; PERINEURAL at 08:19

## 2020-07-10 RX ADMIN — SODIUM CHLORIDE, POTASSIUM CHLORIDE, SODIUM LACTATE AND CALCIUM CHLORIDE: 600; 310; 30; 20 INJECTION, SOLUTION INTRAVENOUS at 07:30

## 2020-07-10 RX ADMIN — Medication 10 MG: at 08:40

## 2020-07-10 RX ADMIN — FENTANYL CITRATE 25 MCG: 50 INJECTION, SOLUTION INTRAMUSCULAR; INTRAVENOUS at 08:23

## 2020-07-10 RX ADMIN — FENTANYL CITRATE 25 MCG: 50 INJECTION, SOLUTION INTRAMUSCULAR; INTRAVENOUS at 08:26

## 2020-07-10 RX ADMIN — Medication 10 MG: at 08:49

## 2020-07-10 RX ADMIN — PROPOFOL 200 MG: 10 INJECTION, EMULSION INTRAVENOUS at 08:19

## 2020-07-10 ASSESSMENT — LIFESTYLE VARIABLES: TOBACCO_USE: 1

## 2020-07-10 ASSESSMENT — MIFFLIN-ST. JEOR: SCORE: 1552.25

## 2020-07-10 NOTE — ANESTHESIA CARE TRANSFER NOTE
Patient: Vesna Fabian    Procedure(s):  Left knee patellorfemoral ligament repair    Diagnosis: Ligament tear [T14.8XXA]  Diagnosis Additional Information: No value filed.    Anesthesia Type:   Peripheral Nerve Block, General     Note:  Airway :Nasal Cannula  Patient transferred to:PACU  Handoff Report: Identifed the Patient, Identified the Reponsible Provider, Reviewed the pertinent medical history, Discussed the surgical course, Reviewed Intra-OP anesthesia mangement and issues during anesthesia, Set expectations for post-procedure period and Allowed opportunity for questions and acknowledgement of understanding      Vitals: (Last set prior to Anesthesia Care Transfer)    CRNA VITALS  7/10/2020 0838 - 7/10/2020 0913      7/10/2020             Resp Rate (set):  8                Electronically Signed By: ZAFAR James CRNA  July 10, 2020  9:13 AM

## 2020-07-10 NOTE — ANESTHESIA POSTPROCEDURE EVALUATION
Patient: Vesna Fabian    Procedure(s):  Left knee patellorfemoral ligament repair    Diagnosis:Ligament tear [T14.8XXA]  Diagnosis Additional Information: No value filed.    Anesthesia Type:  Peripheral Nerve Block, General    Note:  Anesthesia Post Evaluation    Patient location during evaluation: Bedside and Phase 2  Patient participation: Able to fully participate in evaluation  Level of consciousness: awake and alert  Pain management: adequate  Airway patency: patent  Cardiovascular status: acceptable  Respiratory status: acceptable  Hydration status: acceptable  PONV: none     Anesthetic complications: None          Last vitals:  Vitals:    07/10/20 0945 07/10/20 0955 07/10/20 1000   BP: 123/77 124/61 107/74   Pulse: 70 66 72   Resp: 18 16 16   Temp: 97.2  F (36.2  C)     SpO2: 97% 96% 98%         Electronically Signed By: ZAFAR Mendoza CRNA  July 10, 2020  10:13 AM

## 2020-07-10 NOTE — PROGRESS NOTES
Called patient in regards to stumble in SDS unit. No damage suspected at this time, as patient did not fall and ROM did not exceed 90*, as well, no worrisome symptoms per CAROL Conner in SDS.     Also discussed knee immobilizer wear until block wears off in 24-30 hours. Use of crutches for ambulation recommended. Do not exceed 85-90 degrees ROM at this time.     All questions/concerns answered.     Sara Pichardo PA-C  Orthopedic Physician Assistant  [facility]

## 2020-07-10 NOTE — ANESTHESIA PROCEDURE NOTES
Procedure note : Femoral  Staff -       CRNA: Johnathan Leigh APRN CRNA    Performed By: CRNA    Referred By: Dr. Perkins    Pre-Procedure  Performed by   Referred by Dr. Perkins  Location: pre-op    Procedure Times:7/10/2020 7:37 AM and 7/10/2020 7:44 AM  Pre-Anesthestic Checklist: patient identified, IV checked, site marked, risks and benefits discussed, informed consent, monitors and equipment checked, pre-op evaluation, at physician/surgeon's request and post-op pain management    Timeout  Correct Patient: Yes   Correct Procedure: Yes   Correct Site: Yes   Correct Laterality: Yes   Correct Position: Yes   Site Marked: Yes   .   Procedure Documentation    Diagnosis:SURGICAL.    Procedure: Femoral, left.   Patient Position:supine   Ultrasound used to identify targeted nerve, plexus, or vascular marker and placed a needle adjacent to it., Ultrasound was used to visualize the spread of the anesthetic in close proximity to the above stated nerve. A permanent image is entered into the patient's record.  Patient Prep/Sterile Barriers; chlorhexidine gluconate and isopropyl alcohol.  .  Needle: insulated   Needle Gauge: 20.    Needle Length (Inches) 4   Insertion Method: Single Shot.        Assessment/Narrative  Paresthesias: No.  Injection made incrementally with aspirations every 5 mL..  The placement was negative for: blood aspirated, painful injection and site bleeding.  Bolus given via needle..   Secured via.   Complications: none. Comments:  No complications.

## 2020-07-10 NOTE — DISCHARGE INSTRUCTIONS
Post-Anesthesia Patient Instructions    IMMEDIATELY FOLLOWING SURGERY:  Do not drive or operate machinery for the first twenty four hours after surgery.  Do not make any important decisions for twenty four hours after surgery or while taking narcotic pain medications or sedatives.  If you develop intractable nausea and vomiting or a severe headache please notify your doctor immediately.    FOLLOW-UP:  Please make an appointment with your surgeon as instructed. You do not need to follow up with anesthesia unless specifically instructed to do so.    WOUND CARE INSTRUCTIONS (if applicable):  Keep a dry clean dressing on the anesthesia/puncture wound site if there is drainage.  Once the wound has quit draining you may leave it open to air.  Generally you should leave the bandage intact for twenty four hours unless there is drainage.  If the epidural site drains for more than 36-48 hours please call the anesthesia department.    QUESTIONS?:  Please feel free to call your physician or the hospital  if you have any questions, and they will be happy to assist you.                  KNEE SURGERY        POST OPERATIVE PATIENT INFORMATION     Please refer to these instructions as they will be helpful throughout your initial postoperative course.     POSTOPERATIVE VISIT:  *You were set up with a postoperative visit with the orthopedics department approximately 2-weeks after your procedure. Your appointment will be listed on your discharge summary today - and you will see either Dr. Perkins and/or Sara FOSS in the office.     DIET  No restrictions. Drink plenty of fluids. If you become nauseated, stay as quiet as possible and try fluids such as tea, soup, or lemon-lime pop.   Take Zofran as needed for nausea (if it was prescribed post-operatively to you).     DISCOMFORT  You may experience some discomfort. To reduce discomfort, try the following:    * Keep leg elevated on pillows as much as  "possible for 2-3 days to reduce swelling (swelling increases pain.)   * Apply an ice pack to your knee for 3 days after surgery to help reduce swelling,  and periodically for the first 1-2 weeks post operatively.   * Take aspirin, Ascriptin, Tylenol or the prescription (Oxycodone) you may have received.     ACTIVITY  *It is not unusual to feel tired and weak the day after surgery. Resting quietly is recommended, keeping the operative leg elevated. You may put weight on your operative leg as tolerated using the crutches for balance, unless otherwise instructed.   *You received a nerve block from the anesthesia team, most patients are in a \"straight-leg\" immobilizer brace until the block wears off, refer to instructions from Sara and Dr. Perkins on how long you should be in this brace/dressing for  *See notes below about knee exercises.   *Orthopedics will instruct you upon how long you should using your crutches.     CARE OF OPERATIVE SITE  Keep the elastic wrap in place to keep the bandage dry with these exceptions:  *If your dressing is too tight, you may remove the ACE wrap dressing and reapply it in a looser fashion.    Depending which type of sutures you received - either your sutures will absorb on their own, or if above the skin - we will take them out at your 2-week postoperative visit with orthopedics.    IMPORTANT OBSERVATIONS  Check C-M-S of operative leg every 4 hours while you are awake for the first 48 hours:    * C: color - should appear normal/pink   * M: motion - able to move and wiggle fingers and toes.    * S: sensation - able to \"feel\": no numbness, tingling  If you experience changes in C-M-S and/or in severe pain, you may remove the elastic bandages and reapply it - tight enough to provide support for the gauze dressing but loose enough to improve C-M-S. The gauze dressing should NOT be removed when re wrapping the elastic bandage.     KNEE EXERCISES - See Below  Unless instructed otherwise, " begin these on days 3-5 postoperatively  These exercises help maintain muscle tone in you leg and strengthen the muscles supporting the knee. Do them a minimum of three times a day; ten times each. If you have questions about the exercises or problems doing them please contact your doctor.     Quad Sets. Lying on your back, press the back side of your knee down against the bed, tightening the muscles on the of your thigh. Hold for a count of five. Relax and repeat.     Hamstring Isometrics. Push your heel into the bed as if attempting to bend your knee. You should feel the muscles on the back of your thigh tighten. Hold for a count of five. Relax and repeat.     CONTACT YOUR DOCTOR...if you have any problems, such as:    * Fever of 100 degrees or higher ( a fever below 100 degrees may occur as a  normal response to the surgical procedure)   * Changes in C-M-S and/or pain, unrelieved by pain medication or re-wrapping  the elastic bandage   * Pain or tenderness in the calf of either leg      Knee Exercises  -Knee Exercises may begin 3-5 days post-operatively unless instructed otherwise       Quadriceps Sets:      Lie on your back in bed, legs straight.    Tighten the muscle at the front of the thigh as you press the back of your knee down toward the bed. Hold for 5 to 10 seconds. Then relax the leg.    Straight Leg Raises:      Lie in bed. Bend one leg. Keep your other leg straight on the bed.    Lift your straight leg as high as you         comfortably can, but not higher than        12 inches. Hold for 5 seconds. Then         slowly lower the leg.      [id number] The [employer] software. [address] [place] [address], [place] [address]. All rights reserved. This information is not intended as a substitute for professional medical care. Always follow your healthcare professional's instructions.

## 2020-07-13 ENCOUNTER — TELEPHONE (OUTPATIENT)
Dept: ORTHOPEDICS | Facility: OTHER | Age: 35
End: 2020-07-13

## 2020-07-13 DIAGNOSIS — Z98.890 S/P LEFT KNEE SURGERY: ICD-10-CM

## 2020-07-13 RX ORDER — OXYCODONE AND ACETAMINOPHEN 5; 325 MG/1; MG/1
1-2 TABLET ORAL EVERY 4 HOURS PRN
Qty: 18 TABLET | Refills: 0 | Status: SHIPPED | OUTPATIENT
Start: 2020-07-13 | End: 2020-12-09

## 2020-07-13 NOTE — TELEPHONE ENCOUNTER
Refill of Percocet 5-325mg written and sent through to [facility]. Patient called, safe storage and taking reviewed.     Sara Pichardo PA-C  Orthopedic Physician Assistant  [facility]

## 2020-07-13 NOTE — TELEPHONE ENCOUNTER
Patient called left message that she was told to call when she need a refill. She is running low.     Can call patient at [phone]

## 2020-07-15 ENCOUNTER — TELEPHONE (OUTPATIENT)
Dept: PSYCHIATRY | Facility: OTHER | Age: 35
End: 2020-07-15

## 2020-07-15 ENCOUNTER — VIRTUAL VISIT (OUTPATIENT)
Dept: PSYCHIATRY | Facility: OTHER | Age: 35
End: 2020-07-15
Attending: PSYCHIATRY & NEUROLOGY
Payer: COMMERCIAL

## 2020-07-15 DIAGNOSIS — F90.2 ADHD (ATTENTION DEFICIT HYPERACTIVITY DISORDER), COMBINED TYPE: Primary | ICD-10-CM

## 2020-07-15 PROCEDURE — 99214 OFFICE O/P EST MOD 30 MIN: CPT | Mod: TEL | Performed by: PSYCHIATRY & NEUROLOGY

## 2020-07-15 RX ORDER — LISDEXAMFETAMINE DIMESYLATE 60 MG/1
60 CAPSULE ORAL DAILY
Qty: 30 CAPSULE | Refills: 0 | Status: SHIPPED | OUTPATIENT
Start: 2020-08-12 | End: 2020-09-11

## 2020-07-15 RX ORDER — LISDEXAMFETAMINE DIMESYLATE 60 MG/1
60 CAPSULE ORAL DAILY
Qty: 30 CAPSULE | Refills: 0 | Status: SHIPPED | OUTPATIENT
Start: 2020-09-09 | End: 2020-10-09

## 2020-07-15 RX ORDER — LISDEXAMFETAMINE DIMESYLATE 60 MG/1
60 CAPSULE ORAL EVERY MORNING
Qty: 30 CAPSULE | Refills: 0 | Status: SHIPPED | OUTPATIENT
Start: 2020-07-15 | End: 2020-08-14

## 2020-07-15 ASSESSMENT — ANXIETY QUESTIONNAIRES
3. WORRYING TOO MUCH ABOUT DIFFERENT THINGS: NOT AT ALL
1. FEELING NERVOUS, ANXIOUS, OR ON EDGE: NOT AT ALL
6. BECOMING EASILY ANNOYED OR IRRITABLE: SEVERAL DAYS
2. NOT BEING ABLE TO STOP OR CONTROL WORRYING: NOT AT ALL
5. BEING SO RESTLESS THAT IT IS HARD TO SIT STILL: NOT AT ALL
7. FEELING AFRAID AS IF SOMETHING AWFUL MIGHT HAPPEN: NOT AT ALL
GAD7 TOTAL SCORE: 1

## 2020-07-15 ASSESSMENT — PATIENT HEALTH QUESTIONNAIRE - PHQ9
SUM OF ALL RESPONSES TO PHQ QUESTIONS 1-9: 1
5. POOR APPETITE OR OVEREATING: NOT AT ALL

## 2020-07-15 NOTE — PROGRESS NOTES
"Vesna Fabian is a 35 year old female who is being evaluated via a billable telephone visit.      The patient has been notified of following:     \"This telephone visit will be conducted via a call between you and your physician/provider. We have found that certain health care needs can be provided without the need for a physical exam.  This service lets us provide the care you need with a short phone conversation.  If a prescription is necessary we can send it directly to your pharmacy.  If lab work is needed we can place an order for that and you can then stop by our lab to have the test done at a later time.    Telephone visits are billed at different rates depending on your insurance coverage. During this emergency period, for some insurers they may be billed the same as an in-person visit.  Please reach out to your insurance provider with any questions.    If during the course of the call the physician/provider feels a telephone visit is not appropriate, you will not be charged for this service.\"    Patient has given verbal consent for Telephone visit?  Yes    What phone number would you like to be contacted at? [phone]    How would you like to obtain your AVS? Fior    Phone call duration: 8 minutes          SUBJECTIVE / INTERIM HISTORY                                                                        Social- with parents and Rafael Children- [place] is 15 yo daughter   Last visit April 14 2020: Refill Vyvanse today 60 mg daily and last script was March so filled for 4/14/, 5/11 and for 6/8   - on leave from work given knee surgery   - going okay despite we are in covid-19  -  Working at [employer]. Positives: more regular hours. Negatives: desk job.   - been really tired. Exhausted. Works until 4:30 and tends to not do much when she gets home besides get dinner and go to bed    SUBSTANCE USE- reports no issues    SYMPTOMS-   SIB: Burning (last was in Oct '15) , Overwhelming, irritability, SIB  were " becoming more of an issue again so she started working with therapist Hugh Uribe.  Irritability gotten better and moods bit better. Stress with uncertainly, lack of control  MEDICAL ROS-  Fatigue, (headaches, constipation) these are more with stress  MEDICAL / SURGICAL HISTORY                pregnant [if applicable]--no     Patient Active Problem List   Diagnosis     Family planning     Moderate major depression (H)     Anxiety     Chronic fatigue     Encounter for routine gynecological examination     Decreased libido     Fatigue     Smoker     ASCUS (atypical squamous cells of undetermined significance) on Pap smear     NO SHOW     ACP (advance care planning)     Ligament tear     ALLERGY   Patient has no known allergies.  MEDICATIONS                                                                                             Current Outpatient Medications   Medication Sig     ondansetron (ZOFRAN-ODT) 4 MG ODT tab Take 1 tablet (4 mg) by mouth every 8 hours as needed for nausea     oxyCODONE-acetaminophen (PERCOCET) 5-325 MG tablet Take 1-2 tablets by mouth every 4 hours as needed for moderate to severe pain     No current facility-administered medications for this visit.        VITALS   There were no vitals taken for this visit.     PHQ9                     [unfilled]  LABS                                                                                                                           TSH   Date Value Ref Range Status   04/21/2014 1.26 0.34 - 4.82 mU/L Final   ]    CBC RESULTS:   Recent Labs   Lab Test  04/21/14   1113   WBC  7.9   RBC  5.06   HGB  14.9   HCT  43.6   MCV  86   MCH  29.4   MCHC  34.2   RDW  13.2   PLT  380       MENTAL STATUS EXAM                                                                                       Speech: normal volume, rhythm, rate.  Mood was euthymic . Thought process, including associations, was unremarkable and thought content was devoid of suicidal and  homicidal ideation and psychotic thought. No hallucinations. Insight was good. Judgment was intact and adequate for safety. Fund of knowledge was intact. Pt demonstrates no obvious problems with attention, concentration, language, recent or remote memory although these were not formally tested.     ASSESSMENT                                                                                                      HISTORICAL:  Initial psych note 4/15/16         NOTES:   Notes: Luvox sedation     Vesna Fabian is a 34 yo with psychiatric hx of : depression, anxiety, ADHD, one hospital stay here at  around 2013. Was working with Hugh Uribe for psychotherapy several months on some trauma issues that happened and she has never discussed with others. Therapy helped significantly.  No changes today.      TREATMENT RISK STATEMENT:  The risks, benefits, alternatives and potential adverse effects have been explained and are understood by the pt.  The pt agrees to the treatment plan with the ability to do so.   The pt knows to call the clinic for any problems or access emergency care if needed.        DIAGNOSES                    MDD, recurrent, mod  ADHD       PLAN                                                                                                                    1)  MEDICATIONS:         -- Refill Vyvanse today 60 mg daily and last script was 6/13/20 so filled for today 7/15/20, 8/12/20 and for 9/9/20  2)  THERAPY: no change    3)  LABS: none    4)  PT MONITOR [call for probs]:  Worsening symptoms, SI/HI, SEs from meds    5)  REFERRALS [CD, medical, other]:  None    6)  RTC:    ~3 months

## 2020-07-15 NOTE — TELEPHONE ENCOUNTER
Please schedule a follow up appointment with Dr. Candelaria Clarke in 3 months about Oct 16 for a 20 minute appointment.

## 2020-07-16 ASSESSMENT — ANXIETY QUESTIONNAIRES: GAD7 TOTAL SCORE: 1

## 2020-07-22 ENCOUNTER — OFFICE VISIT (OUTPATIENT)
Dept: ORTHOPEDICS | Facility: OTHER | Age: 35
End: 2020-07-22
Attending: PHYSICIAN ASSISTANT
Payer: COMMERCIAL

## 2020-07-22 VITALS
TEMPERATURE: 97.5 F | OXYGEN SATURATION: 99 % | DIASTOLIC BLOOD PRESSURE: 78 MMHG | HEART RATE: 74 BPM | SYSTOLIC BLOOD PRESSURE: 128 MMHG

## 2020-07-22 DIAGNOSIS — Z98.890 S/P LEFT KNEE SURGERY: Primary | ICD-10-CM

## 2020-07-22 DIAGNOSIS — Z98.890 POST-OPERATIVE STATE: ICD-10-CM

## 2020-07-22 PROCEDURE — 99024 POSTOP FOLLOW-UP VISIT: CPT | Performed by: PHYSICIAN ASSISTANT

## 2020-07-22 RX ORDER — HYDROMORPHONE HYDROCHLORIDE 2 MG/1
2 TABLET ORAL EVERY 6 HOURS PRN
Qty: 18 TABLET | Refills: 0 | Status: SHIPPED | OUTPATIENT
Start: 2020-07-22 | End: 2020-07-27

## 2020-07-22 ASSESSMENT — PAIN SCALES - GENERAL: PAINLEVEL: SEVERE PAIN (6)

## 2020-07-22 NOTE — NURSING NOTE
"Chief Complaint   Patient presents with     Musculoskeletal Problem       Initial /78   Pulse 74   Temp 97.5  F (36.4  C)   SpO2 99%  Estimated body mass index is 31.78 kg/m  as calculated from the following:    Height as of 7/10/20: 1.651 m (5' 5\").    Weight as of 7/10/20: 86.6 kg (191 lb).  Medication Reconciliation: complete  Jessa Behrman, LPN  "

## 2020-07-27 ENCOUNTER — DOCUMENTATION ONLY (OUTPATIENT)
Dept: ORTHOPEDICS | Facility: OTHER | Age: 35
End: 2020-07-27

## 2020-07-27 NOTE — PROGRESS NOTES
Return to work form filled out for patient. RTW 7/29/2020 with no lifting, pushing or pulling. Minimal standing. Ability to take breaks to ice/elevate as needed. Sedentary duty.     Faxed to patient's employer at [phone]    Patient mailed copy as well.     Sara Pichardo PA-C  Orthopedic Physician Assistant  [facility]

## 2020-07-28 DIAGNOSIS — Z98.890 S/P LEFT KNEE SURGERY: Primary | ICD-10-CM

## 2020-07-28 DIAGNOSIS — Z98.890 POST-OPERATIVE STATE: ICD-10-CM

## 2020-08-03 NOTE — PROGRESS NOTES
Postoperative Visit:    Procedure: Repair of the left knee patellofemoral ligament on 7/10/2020 with Dr. Perkins     Patient Profile: 34-year old, RHD, Former Smoker, [employer] Employee, Patient of Dixie Chun PA-C     Chief Complaint: Left Knee     HPI: Patient presented to  on 6/15/2020 with CC of left knee pain. Four days prior patient fell onto her left knee after slipping on water. 6/10 rest pain and 9/10 at worst, radiated up thigh, medial side groin pain. Associated numbness over knee, hip and ankle throbbing. Bending and straightening provocative in nature. No relief with OTC modalities of treatment. X-rays normal.  note is not finished to show A&P.      6/17 - PE difficult to obtain due to pain and swelling, however, based off findings an MRI of the knee ordered to rule out internal derangement. Continue with compression and use as tolerated. Crutches for weightbearing status if needed. Tramadol for pain control - short term Rx written.     6/23 - Discussed anatomy, physiology and typical treatment plans for the above fracture pattern. Surgical intervention is recommended and will consist of: LEFT knee patellofemoral ligament repair (open). Patient will require a preoperative H&P, preoperative COVID test 72-hours prior to procedure and a two-week postoperative visit with the orthopedics department. All risks and benefits of surgical intervention were discussed at length with patient. Allergies review and preoperative orders (antibiotics, etc.). are to be placed.     7/22 - Tingling and neuro symptoms should ware off as block subsides, possible that anesthesia came in close contact with nerve during surgical block and that this is the source of her symptoms, discussed that the nerve could be irritated and likely healing/settling down at a 1-1.5cm rate per day. Continue with crutches until feel stable to bear weight, can transition to one crutch then off crutches. Keep incision site clean, dry and  covered. RTC 2-weeks.     Subjective:   Location: LEFT Knee  Quality: stiff, sore  Severity: 3/10  Palliative: rest, ice  Provocative: knee brace  Associated Signs and Symptoms: numbness    Objective: /80   Pulse 89   Temp 97.8  F (36.6  C)   Wt 86.6 kg (191 lb)   SpO2 99%   BMI 31.78 kg/m    Vesna is a pleasant, 35-year old. Head is normocephalic and atraumatic, sclerae are clear. Patient hears me normally, trachea midline, chest excursion is normal. Respiratory efforts are non-labored. Cardiac: there are no audible cardiac murmurs or gallops. Patient is alert and oriented and expresses proper mood and affect. Gait with crutches.     Left Knee; incision sites are C/D/I. No effusion. TTP medial anterior joint line. ROM 0-15. stable  to varus, valgus, Lachman, A&P drawer ligamentous stressing. Strength not assessed due to acute postoperative status. Neurovascular status, intact distal pulses 2+, light touch sensation intact.    Imaging; no new imaging    Impression:   #1. Postoperative State  #2. S/p Left Knee Surgery    Plan: PT scheduled while patient in office, she is scheduled to begin PT on 8/10/2020 with MARÍA ELENA Huerta.we kindly appreciate Deanna's role and help in patient care. Work/School status - as tolerated, avoid deep flexion and bending. Patient instructed on heel slides. DME for knee ranger brace given. Patient may use heat, RICE protocol and OTC analgesics PRN for pain and swelling control. Patient is in agreement and understanding of the above treatment plan. All questions and concerns were addressed and answered to patient's satisfaction. If questions/concerns arise before next visit they may contact the office at any point in time and we would be happy to assist them. Patient will follow up with orthopedics 3-weeks.    Sara Pichardo PA-C  Orthopedic Physician Assistant  [facility]

## 2020-08-04 ENCOUNTER — OFFICE VISIT (OUTPATIENT)
Dept: ORTHOPEDICS | Facility: OTHER | Age: 35
End: 2020-08-04
Attending: PHYSICIAN ASSISTANT
Payer: COMMERCIAL

## 2020-08-04 VITALS
WEIGHT: 191 LBS | SYSTOLIC BLOOD PRESSURE: 120 MMHG | BODY MASS INDEX: 31.78 KG/M2 | DIASTOLIC BLOOD PRESSURE: 80 MMHG | OXYGEN SATURATION: 99 % | HEART RATE: 89 BPM | TEMPERATURE: 97.8 F

## 2020-08-04 DIAGNOSIS — Z87.39 STATUS POST RECONSTRUCTION OF MEDIAL PATELLOFEMORAL LIGAMENT: ICD-10-CM

## 2020-08-04 DIAGNOSIS — Z98.890 POST-OPERATIVE STATE: ICD-10-CM

## 2020-08-04 DIAGNOSIS — Z98.890 STATUS POST RECONSTRUCTION OF MEDIAL PATELLOFEMORAL LIGAMENT: ICD-10-CM

## 2020-08-04 DIAGNOSIS — Z98.890 S/P LEFT KNEE SURGERY: Primary | ICD-10-CM

## 2020-08-04 PROCEDURE — 99024 POSTOP FOLLOW-UP VISIT: CPT | Performed by: PHYSICIAN ASSISTANT

## 2020-08-04 ASSESSMENT — PAIN SCALES - GENERAL: PAINLEVEL: MODERATE PAIN (5)

## 2020-08-04 NOTE — NURSING NOTE
"Chief Complaint   Patient presents with     Surgical Followup       Initial /80   Pulse 89   Temp 97.8  F (36.6  C)   Wt 86.6 kg (191 lb)   SpO2 99%   BMI 31.78 kg/m   Estimated body mass index is 31.78 kg/m  as calculated from the following:    Height as of 7/10/20: 1.651 m (5' 5\").    Weight as of this encounter: 86.6 kg (191 lb).  Medication Reconciliation: complete    Laurita Plascencia LPN    "

## 2020-08-10 ENCOUNTER — HOSPITAL ENCOUNTER (OUTPATIENT)
Dept: PHYSICAL THERAPY | Facility: HOSPITAL | Age: 35
Setting detail: THERAPIES SERIES
End: 2020-08-10
Attending: PHYSICIAN ASSISTANT
Payer: COMMERCIAL

## 2020-08-10 DIAGNOSIS — Z98.890 S/P LEFT KNEE SURGERY: ICD-10-CM

## 2020-08-10 DIAGNOSIS — Z98.890 POST-OPERATIVE STATE: ICD-10-CM

## 2020-08-10 PROCEDURE — 97110 THERAPEUTIC EXERCISES: CPT | Mod: GP | Performed by: PHYSICAL THERAPIST

## 2020-08-10 PROCEDURE — 97162 PT EVAL MOD COMPLEX 30 MIN: CPT | Mod: GP | Performed by: PHYSICAL THERAPIST

## 2020-08-10 PROCEDURE — 97140 MANUAL THERAPY 1/> REGIONS: CPT | Mod: GP | Performed by: PHYSICAL THERAPIST

## 2020-08-10 ASSESSMENT — ACTIVITIES OF DAILY LIVING (ADL)
HOW_WOULD_YOU_RATE_THE_OVERALL_FUNCTION_OF_YOUR_KNEE_DURING_YOUR_USUAL_DAILY_ACTIVITIES?: ABNORMAL
GO UP STAIRS: ACTIVITY IS VERY DIFFICULT
SIT WITH YOUR KNEE BENT: I AM UNABLE TO DO THE ACTIVITY
HOW_WOULD_YOU_RATE_THE_CURRENT_FUNCTION_OF_YOUR_KNEE_DURING_YOUR_USUAL_DAILY_ACTIVITIES_ON_A_SCALE_FROM_0_TO_100_WITH_100_BEING_YOUR_LEVEL_OF_KNEE_FUNCTION_PRIOR_TO_YOUR_INJURY_AND_0_BEING_THE_INABILITY_TO_PERFORM_ANY_OF_YOUR_USUAL_DAILY_ACTIVITIES?: 25
STAND: ACTIVITY IS SOMEWHAT DIFFICULT
RISE FROM A CHAIR: ACTIVITY IS FAIRLY DIFFICULT
WALK: ACTIVITY IS VERY DIFFICULT
KNEEL ON THE FRONT OF YOUR KNEE: I AM UNABLE TO DO THE ACTIVITY
GO DOWN STAIRS: ACTIVITY IS VERY DIFFICULT
SQUAT: I AM UNABLE TO DO THE ACTIVITY
AS_A_RESULT_OF_YOUR_KNEE_INJURY,_HOW_WOULD_YOU_RATE_YOUR_CURRENT_LEVEL_OF_DAILY_ACTIVITY?: SEVERELY ABNORMAL

## 2020-08-10 NOTE — PROGRESS NOTES
08/10/20 1200   General Information   Type of Visit Initial OP Ortho PT Evaluation   Start of Care Date 08/10/20   Referring Physician Sara Pichardo PA-C   Patient/Family Goals Statement less pain, regain function and motion   Orders Evaluate and Treat   Date of Order 08/04/20   Certification Required? No   Medical Diagnosis s/p L MPFL reconstruction   Surgical/Medical history reviewed Yes   Precautions/Limitations   (Brace locked at 30 degrees now)   General Information Comments DOS: [id number]   Body Part(s)   Body Part(s) Knee   Presentation and Etiology   Pertinent history of current problem (include personal factors and/or comorbidities that impact the POC) Patient presents 4 weeks post-op L MPFL reconstruction after slipping on water and injuring her L knee on 6/10/2020. She was immobilized following surgery.  She is now in a ranger brace that is locked at 15-20 degrees - wanted 30 but  brace does not allow for it. She has been working on SLRs and gentle heel slides. She has residual pain, numbness/tlinging into her adductor muscle distribution that has improved since surgery, but it still wuite miserable. She is walking with one crutch and will use two crutches when she needs to get around faster.  She is here to progress her ROM and strength and fuction and progress walking as tolerated   Impairments A. Pain;B. Decreased WB tolerance;C. Swelling;D. Decreased ROM;E. Decreased flexibility;F. Decreased strength and endurance;G. Impaired balance;H. Impaired gait;J. Burning;K. Numbness;L. Tingling   Functional Limitations perform activities of daily living;perform required work activities;perform desired leisure / sports activities   Symptom Location L knee   How/Where did it occur With a fall   Onset date of current episode/exacerbation 06/10/20   Chronicity New   Pain rating (0-10 point scale) Best (/10);Worst (/10)   Best (/10) 4   Worst (/10) 8   Pain quality A. Sharp;C. Aching;D. Burning;F. Stabbing    Frequency of pain/symptoms A. Constant   Pain/symptoms are: The same all the time   Pain/symptoms exacerbated by A. Sitting;B. Walking;C. Lifting;D. Carrying;G. Certain positions;I. Bending;K. Home tasks  (sleeping)   Pain/symptoms eased by H. Cold   Progression of symptoms since onset: Unchanged   Current Level of Function   Current Community Support Family/friend caregiver   Patient role/employment history A. Employed   Employment Comments [employer] reported   Fall Risk Screen   Fall screen completed by PT   Have you fallen 2 or more times in the past year? Yes   Have you fallen and had an injury in the past year? Yes   Is patient a fall risk? Yes   Fall screen comments will address in PT   System Outcome Measures   Outcome Measures   (Knee)   Knee Objective Findings   Side (if bilateral, select both right and left) Left   Observation mild swelling still noted primarily medial aspect   Integumentary  healing well, Suprapatellar: 44cm, Infrapatellar: 39cm   Gait/Locomotion with knee brace, 1 crutch with decreased knee flex and difficulty withswing phase of gait   Balance/Proprioception (Single Leg Stance) Impaired B   Knee ROM Comment Improved following manual techniques as gravity hang stretch - 45 degrees   Knee Special Test Comments NT due to post-op state   Palpation TTP around medial knee, decreased sensation around adductor distribution   Accessory Motion/Joint Mobility hypo tib-femoral joint   Left Knee Extension AROM lacks 6 degrees   Left Knee Extension PROM lacks 4 degrees   Left Knee Flexion AROM 30   Left Knee Flexion PROM 34   Left Knee Flexion Strength NT   Left Knee Extension Strength NT   Left Quad Set Strength fair - 4-/5   L VMO Strength 4-/5   Left Gastrocnemius Flexibility hypo   Left Hamstring Flexibility hypo   Left Quadricep Flexibility hypo   Planned Therapy Interventions   Planned Therapy Interventions balance training;joint mobilization;manual therapy;neuromuscular  re-education;ROM;strengthening;stretching   Planned Modality Interventions   Planned Modality Interventions Comments as needed, possible nmes   Clinical Impression   Criteria for Skilled Therapeutic Interventions Met yes, treatment indicated   PT Diagnosis s/p L MPFL reconstruction   Influenced by the following impairments pain, tissue resistance and weakness   Functional limitations due to impairments difficulty with performing home and ADLs   Clinical Presentation Stable/Uncomplicated   Clinical Presentation Rationale due to lack of additional comorbidiites that may influence anticipated PT progress   Clinical Decision Making (Complexity) Low complexity   Therapy Frequency 2 times/Week   Predicted Duration of Therapy Intervention (days/wks) up to 8 weeks, progress to 1x/week fo rup to 4 weeks   Risk & Benefits of therapy have been explained Yes   Patient, Family & other staff in agreement with plan of care Yes   Clinical Impression Comments Presenation is consistent with s/p MPFL reconstruction that is expected to improve with skilled PT intervention   Education Assessment   Preferred Learning Style Demonstration   Barriers to Learning No barriers   ORTHO GOALS   PT Ortho Eval Goals 1;2;3;4   Ortho Goal 1   Goal Identifier STG 1   Goal Description Patient will be compliant with HEP in order to make progress while at home   Target Date 08/31/20   Ortho Goal 2   Goal Identifier LTG 1   Goal Description Patient will demonstrate -2 to 120 degrees of flexion motion in order to improve function at work and home   Target Date 09/21/20   Ortho Goal 3   Goal Identifier LTG 2   Goal Description Patient will report overall 80% return to normal function in order to perform all home and work activities   Target Date 10/05/20   Ortho Goal 4   Goal Identifier LTG 3   Goal Description Patient will perform running/jogging activities without pain and with safe mechanics in order to perform all home, work and recreational activities  safely   Target Date 11/02/20   Total Evaluation Time   PT Eval, Moderate Complexity Minutes (06302) 20

## 2020-08-11 ENCOUNTER — VIRTUAL VISIT (OUTPATIENT)
Dept: ORTHOPEDICS | Facility: OTHER | Age: 35
End: 2020-08-11
Attending: PHYSICIAN ASSISTANT
Payer: COMMERCIAL

## 2020-08-11 ENCOUNTER — TELEPHONE (OUTPATIENT)
Dept: ORTHOPEDICS | Facility: OTHER | Age: 35
End: 2020-08-11

## 2020-08-11 DIAGNOSIS — Z98.890 POST-OPERATIVE STATE: ICD-10-CM

## 2020-08-11 DIAGNOSIS — Z98.890 S/P LEFT KNEE SURGERY: Primary | ICD-10-CM

## 2020-08-11 DIAGNOSIS — R52 PAIN MANAGEMENT: ICD-10-CM

## 2020-08-11 PROCEDURE — 99024 POSTOP FOLLOW-UP VISIT: CPT | Performed by: PHYSICIAN ASSISTANT

## 2020-08-11 RX ORDER — HYDROMORPHONE HYDROCHLORIDE 2 MG/1
2 TABLET ORAL EVERY 6 HOURS PRN
Qty: 10 TABLET | Refills: 0 | Status: SHIPPED | OUTPATIENT
Start: 2020-08-11 | End: 2020-08-14

## 2020-08-11 NOTE — PROGRESS NOTES
Telephone Visit    Telephone visit was initiated between patient and provider. Patient was selected for this portfolio of visit by the orthopedics department based off the stability of their prior examination in office. If prescription is needed, the orthopedics clinic will send it directly to pharmacy of patient's choice.     If during the course of the call, we (orthopedics department) feels a telephone visit is not appropriate, patient will not be charged for the visit and a future office visit will be set up for patient instead, or they will be referred to the appropriate location to be examined.     Procedure: Repair of the left knee patellofemoral ligament on 7/10/2020 with Dr. Perkins     Patient Profile: 34-year old, RHD, Former Smoker, [employer] Employee, Patient of Dixie Chun PA-C     Chief Complaint: Left Knee     HPI: Patient presented to  on 6/15/2020 with CC of left knee pain. Four days prior patient fell onto her left knee after slipping on water. 6/10 rest pain and 9/10 at worst, radiated up thigh, medial side groin pain. Associated numbness over knee, hip and ankle throbbing. Bending and straightening provocative in nature. No relief with OTC modalities of treatment. X-rays normal.  note is not finished to show A&P.      6/17 - PE difficult to obtain due to pain and swelling, however, based off findings an MRI of the knee ordered to rule out internal derangement. Continue with compression and use as tolerated. Crutches for weightbearing status if needed. Tramadol for pain control - short term Rx written.     6/23 - Discussed anatomy, physiology and typical treatment plans for the above fracture pattern. Surgical intervention is recommended and will consist of: LEFT knee patellofemoral ligament repair (open). Patient will require a preoperative H&P, preoperative COVID test 72-hours prior to procedure and a two-week postoperative visit with the orthopedics department. All risks and benefits of  surgical intervention were discussed at length with patient. Allergies review and preoperative orders (antibiotics, etc.). are to be placed.      7/22 - Tingling and neuro symptoms should ware off as block subsides, possible that anesthesia came in close contact with nerve during surgical block and that this is the source of her symptoms, discussed that the nerve could be irritated and likely healing/settling down at a 1-1.5cm rate per day. Continue with crutches until feel stable to bear weight, can transition to one crutch then off crutches. Keep incision site clean, dry and covered. RTC 2-weeks.     8/4 - PT scheduled while patient in office, she is scheduled to begin PT on 8/10/2020 with MARÍA ELENA Huerta.we kindly appreciate Deanna's role and help in patient care. Work/School status - as tolerated, avoid deep flexion and bending. RTC 3-weeks ROM check.     8/10 - PT eval with Deanna DTChristiano    8/11 patient called clinic - called into work due to pain - requesting office note and pain medication - offered virtual visit to discuss.     PMHx, Social Hx, Surgical Hx and Family Hx reviewed.  Allergies and Rx reviewed by LPN during telephone rooming visit process.     Subjective:   Location: LEFT Knee   Quality: stiff, sore  Severity: 8/10 - feels like fire, remains despite taking two extra strength advil - last dose 1000  Duration: 1-day  Palliative Measures: RICE protocol, compression cube/tube given by PT  Provocative Measures: PT eval, recent increase in activity  PT Progress: eval 8/10/2020   Associated Signs and Symptoms: swelling -  moderate  Questions/Concerns: work note and pain control    Objective: No vitals taken for telephone visit  Vesna is a pleasant, 35-year old. Patient hears me normally. Respiratory efforts are non-labored. Patient is alert and oriented and expresses proper mood and affect during phone encounter. Due to nature of phone visit, no orthopedic examination took place.     Impression:   #1. Postoperative  State  #2. S/p Left Knee Surgery  #3. Pain Management    Plan: Reviewed pain control with patient - short term RX written to [facility] - discussed acute versus chronic pain. Dilaudid 2mg PO Q6 #10 (ten) written for pain control - she has to space this out, if turns into chronic pain she will have to discuss this with PCP. Recommend alternating tylenol and Ibuprofen as well, rest, ice and elevation recommended. Believe this is acute pain flare up from recent PT evaluation on 8/10/2020 . Work - work note to excuse for absence 8/11/2020. May use heat, ice and OTC analgesics PRN. Patient is in agreement and understanding of the above treatment plan. All questions and concerns were addressed and answered to patient's satisfaction. If questions/concerns arise before next visit they may contact the office at any point in time and we would be happy to assist them. Patient will follow up with orthopedics regularly scheduled visit for ROM exam. Continue with PT treatment plan.    I have reviewed the note as documented above. This accurately captures the substance of my conversation with the patient.     Phone Call Contact Time:  Call with provider began at: 1504  Call with provider ended at 1511  *Note this does NOT include time spent with [facility] or nursing staff (which is non-billable time)    Sara Pichardo PA-C  Orthopedic Physician Assistant  [facility]

## 2020-08-11 NOTE — TELEPHONE ENCOUNTER
Is patient able to do a phone visit this afternoon? Would like to discuss pain control with her.     Sara Pichardo PA-C  Orthopedic Physician Assistant  [facility]

## 2020-08-11 NOTE — PROGRESS NOTES
"Vesna Fabian is a 35 year old female who is being evaluated via a billable telephone visit.      The patient has been notified of following:     \"This telephone visit will be conducted via a call between you and your physician/provider. We have found that certain health care needs can be provided without the need for a physical exam.  This service lets us provide the care you need with a short phone conversation.  If a prescription is necessary we can send it directly to your pharmacy.  If lab work is needed we can place an order for that and you can then stop by our lab to have the test done at a later time.    Telephone visits are billed at different rates depending on your insurance coverage. During this emergency period, for some insurers they may be billed the same as an in-person visit.  Please reach out to your insurance provider with any questions.    If during the course of the call the physician/provider feels a telephone visit is not appropriate, you will not be charged for this service.\"    Patient has given verbal consent for Telephone visit?  Yes    What phone number would you like to be contacted at? [phone]    How would you like to obtain your AVS? declined    Laurita Plascencia LPN       "

## 2020-08-11 NOTE — LETTER
[facility] - [place]  [address]  [place] [address]  Phone: [phone]    August 11, 2020        Vesna Fabian  [address]  [place] [address]          To whom it may concern:    RE: Vesna Fabian    Patient was seen and treated today at our clinic via virtual visit. Please excuse patient for her absence due to left knee pain s/p surgery 07/2020.     Please contact me for questions or concerns.      Sincerely,        Sara Pichardo PA-C

## 2020-08-11 NOTE — TELEPHONE ENCOUNTER
Pt called requesting something more for pain as ibuprofen and tylenol are not helping. Pt stated she had to call into work because the pain was so unbearable. Pt also requested a note for missing work.     Laurita Plascencia LPN

## 2020-08-14 ENCOUNTER — HOSPITAL ENCOUNTER (OUTPATIENT)
Dept: PHYSICAL THERAPY | Facility: HOSPITAL | Age: 35
Setting detail: THERAPIES SERIES
End: 2020-08-14
Attending: PHYSICIAN ASSISTANT
Payer: COMMERCIAL

## 2020-08-14 PROCEDURE — 97140 MANUAL THERAPY 1/> REGIONS: CPT | Mod: GP

## 2020-08-14 PROCEDURE — 97110 THERAPEUTIC EXERCISES: CPT | Mod: GP

## 2020-08-17 ENCOUNTER — HOSPITAL ENCOUNTER (OUTPATIENT)
Dept: PHYSICAL THERAPY | Facility: HOSPITAL | Age: 35
Setting detail: THERAPIES SERIES
End: 2020-08-17
Attending: PHYSICIAN ASSISTANT
Payer: COMMERCIAL

## 2020-08-17 PROCEDURE — 97140 MANUAL THERAPY 1/> REGIONS: CPT | Mod: GP

## 2020-08-17 PROCEDURE — 97110 THERAPEUTIC EXERCISES: CPT | Mod: GP

## 2020-08-19 ENCOUNTER — HOSPITAL ENCOUNTER (OUTPATIENT)
Dept: PHYSICAL THERAPY | Facility: HOSPITAL | Age: 35
Setting detail: THERAPIES SERIES
End: 2020-08-19
Attending: PHYSICIAN ASSISTANT
Payer: COMMERCIAL

## 2020-08-19 PROCEDURE — 97140 MANUAL THERAPY 1/> REGIONS: CPT | Mod: GP

## 2020-08-19 PROCEDURE — 97110 THERAPEUTIC EXERCISES: CPT | Mod: GP

## 2020-08-24 ENCOUNTER — HOSPITAL ENCOUNTER (OUTPATIENT)
Dept: PHYSICAL THERAPY | Facility: HOSPITAL | Age: 35
Setting detail: THERAPIES SERIES
End: 2020-08-24
Attending: PHYSICIAN ASSISTANT
Payer: COMMERCIAL

## 2020-08-24 PROCEDURE — 97110 THERAPEUTIC EXERCISES: CPT | Mod: GP | Performed by: PHYSICAL THERAPIST

## 2020-08-24 NOTE — PROGRESS NOTES
Outpatient Physical Therapy Progress Note     Patient: Vesna Fabian  : 1985    Beginning/End Dates of Reporting Period:  8/10/2020 to 2020    Referring Provider: PRUDENCE Yang/ Dr Perkins    Therapy Diagnosis: s/p L MPFL reconstruction     Client Self Report: Patient has been seen for 4 skilled PT sessions following her L MPFL reconstruction. She reports increasing motion and increasing strength. She still has numbness and discomfort in the adductor distribution, however she feels her function is improving overall and she notes improvements with her gait. We unlocked her brace to 0-60 degrees at her second session and we unlocked it to 0-90 degrees today. She is ambulating well without an assistive device and we practiced stairs as well.  She goes back to see her PA tomorrow.    Objective Measurements:  L knee ROM; AROM 0-95, PROM -6-0-100 without pull  Quad strength improving to 4+/5. Hip strength grossly 4+/5  Gait: Ambulates with safe mechanics with brace 0-90 degrees when cued to focus on heel-toe  Stairs: Ambulates up stairs with cueing on using knee flexion vs circumducting  HEP: Progressed to lateral step downs, continue gentle knee ROM exercises, practice gait mechanics and stair mechanics at home. Tubigrip provided for edema management       Goals:  Goal Identifier STG 1   Goal Description Patient will be compliant with HEP in order to make progress while at home   Target Date 20   Date Met  20(2020)   Progress:     Goal Identifier LTG 1   Goal Description Patient will demonstrate -2 to 120 degrees of flexion motion in order to improve function at work and home   Target Date 20   Date Met      Progress:     Goal Identifier LTG 2   Goal Description Patient will report overall 80% return to normal function in order to perform all home and work activities   Target Date 10/05/20   Date Met      Progress:     Goal Identifier LTG 3   Goal Description Patient will perform  running/jogging activities without pain and with safe mechanics in order to perform all home, work and recreational activities safely   Target Date 11/02/20   Date Met      Progress:       Progress Toward Goals:   Progress this reporting period: Patient is making great progress      Plan:  Continue therapy per current plan of care.  We will continue progressing motion and strength gradually.  Will await notes from PA to determine when it is safe to progress motion more and get rid of bracing.    Discharge:  No

## 2020-08-25 ENCOUNTER — OFFICE VISIT (OUTPATIENT)
Dept: ORTHOPEDICS | Facility: OTHER | Age: 35
End: 2020-08-25
Attending: PHYSICIAN ASSISTANT
Payer: COMMERCIAL

## 2020-08-25 VITALS
TEMPERATURE: 97.7 F | OXYGEN SATURATION: 99 % | DIASTOLIC BLOOD PRESSURE: 62 MMHG | HEART RATE: 90 BPM | SYSTOLIC BLOOD PRESSURE: 110 MMHG

## 2020-08-25 DIAGNOSIS — Z98.890 S/P LEFT KNEE SURGERY: Primary | ICD-10-CM

## 2020-08-25 DIAGNOSIS — Z98.890 POST-OPERATIVE STATE: ICD-10-CM

## 2020-08-25 PROCEDURE — 99024 POSTOP FOLLOW-UP VISIT: CPT | Performed by: PHYSICIAN ASSISTANT

## 2020-08-25 ASSESSMENT — PAIN SCALES - GENERAL: PAINLEVEL: MILD PAIN (3)

## 2020-08-25 NOTE — NURSING NOTE
"Chief Complaint   Patient presents with     Musculoskeletal Problem       Initial /62   Pulse 90   Temp 97.7  F (36.5  C)   SpO2 99%  Estimated body mass index is 31.78 kg/m  as calculated from the following:    Height as of 7/10/20: 1.651 m (5' 5\").    Weight as of 8/4/20: 86.6 kg (191 lb).  Medication Reconciliation: complete  Jessa Behrman, LPN  "

## 2020-08-25 NOTE — PROGRESS NOTES
Postoperative Visit:    Procedure: Repair of the left knee patellofemoral ligament on 7/10/2020 with Dr. Perkins     Patient Profile: 34-year old, RHD, Former Smoker, [employer] Employee, Patient of Dixie Chun PA-C     Chief Complaint: Left Knee     HPI: Patient presented to  on 6/15/2020 with CC of left knee pain. Four days prior patient fell onto her left knee after slipping on water. 6/10 rest pain and 9/10 at worst, radiated up thigh, medial side groin pain. Associated numbness over knee, hip and ankle throbbing. Bending and straightening provocative in nature. No relief with OTC modalities of treatment. X-rays normal.  note is not finished to show A&P.      6/17 - PE difficult to obtain due to pain and swelling, however, based off findings an MRI of the knee ordered to rule out internal derangement. Continue with compression and use as tolerated. Crutches for weightbearing status if needed. Tramadol for pain control - short term Rx written.     6/23 - Discussed anatomy, physiology and typical treatment plans for the above fracture pattern. Surgical intervention is recommended and will consist of: LEFT knee patellofemoral ligament repair (open). Patient will require a preoperative H&P, preoperative COVID test 72-hours prior to procedure and a two-week postoperative visit with the orthopedics department. All risks and benefits of surgical intervention were discussed at length with patient. Allergies review and preoperative orders (antibiotics, etc.). are to be placed.      7/22 - Tingling and neuro symptoms should ware off as block subsides, possible that anesthesia came in close contact with nerve during surgical block and that this is the source of her symptoms, discussed that the nerve could be irritated and likely healing/settling down at a 1-1.5cm rate per day. Continue with crutches until feel stable to bear weight, can transition to one crutch then off crutches. Keep incision site clean, dry and  covered. RTC 2-weeks.      8/4 - PT scheduled while patient in office, she is scheduled to begin PT on 8/10/2020 with MARÍA ELENA Huerta.we kindly appreciate Deanna's role and help in patient care. Work/School status - as tolerated, avoid deep flexion and bending. RTC 3-weeks ROM check.      8/10 - PT eval with Deanna Laird     8/11 patient called clinic - called into work due to pain - requesting office note and pain medication - offered virtual visit to discuss.     Subjective:   Location: LEFT Knee  Quality: Stiff  Severity: 3/10  Palliative: rest, ice, elevation  Provocative: stairs, overuse  Associated Signs and Symptoms: numbness medial hip to upper 1/2 calf medially    Objective: /62   Pulse 90   Temp 97.7  F (36.5  C)   SpO2 99%   Vesna is a pleasant, 35-year old. Head is normocephalic and atraumatic, sclerae are clear. Patient hears me normally, trachea midline, chest excursion is normal. Respiratory efforts are non-labored. Cardiac: there are no audible cardiac murmurs or gallops. Patient is alert and oriented and expresses proper mood and affect. Gait is normal.     Left Knee; incision sites are C/D/I. No effusion. TTP medial patellofemoral ligament. ROM 0-90. stable  to varus, valgus, Lachman, A&P drawer ligamentous stressing. Strength not assessed due to acute postoperative status. Neurovascular status, intact distal pulses 2+, light touch sensation intact.    Imaging; no new imaging.     Impression:   #1. Postoperative State  #2. S/p Left Knee Surgery    Plan: Reviewed PT note, patient OK to be out brace. Continue with HEP and formal PT course. Work/School status - as tolerated. Patient may use heat, RICE protocol and OTC analgesics PRN for pain and swelling control. Patient is in agreement and understanding of the above treatment plan. All questions and concerns were addressed and answered to patient's satisfaction. If questions/concerns arise before next visit they may contact the office at any point in time  and we would be happy to assist them. Patient will follow up with orthopedics 5-weeks    Sara Pichardo PA-C  Orthopedic Physician Assistant  [facility]

## 2020-08-26 ENCOUNTER — HOSPITAL ENCOUNTER (OUTPATIENT)
Dept: PHYSICAL THERAPY | Facility: HOSPITAL | Age: 35
Setting detail: THERAPIES SERIES
End: 2020-08-26
Attending: PHYSICIAN ASSISTANT
Payer: COMMERCIAL

## 2020-08-26 PROCEDURE — 97110 THERAPEUTIC EXERCISES: CPT | Mod: GP

## 2020-08-31 ENCOUNTER — HOSPITAL ENCOUNTER (OUTPATIENT)
Dept: PHYSICAL THERAPY | Facility: HOSPITAL | Age: 35
Setting detail: THERAPIES SERIES
End: 2020-08-31
Attending: PHYSICIAN ASSISTANT
Payer: COMMERCIAL

## 2020-08-31 PROCEDURE — 97110 THERAPEUTIC EXERCISES: CPT | Mod: GP

## 2020-09-03 ENCOUNTER — HOSPITAL ENCOUNTER (OUTPATIENT)
Dept: PHYSICAL THERAPY | Facility: HOSPITAL | Age: 35
Setting detail: THERAPIES SERIES
End: 2020-09-03
Attending: PHYSICIAN ASSISTANT
Payer: COMMERCIAL

## 2020-09-03 PROCEDURE — 97110 THERAPEUTIC EXERCISES: CPT | Mod: GP

## 2020-09-08 ENCOUNTER — HOSPITAL ENCOUNTER (OUTPATIENT)
Dept: PHYSICAL THERAPY | Facility: HOSPITAL | Age: 35
Setting detail: THERAPIES SERIES
End: 2020-09-08
Attending: PHYSICIAN ASSISTANT
Payer: COMMERCIAL

## 2020-09-08 PROCEDURE — 97110 THERAPEUTIC EXERCISES: CPT | Mod: GP | Performed by: PHYSICAL THERAPIST

## 2020-09-08 PROCEDURE — 97112 NEUROMUSCULAR REEDUCATION: CPT | Mod: GP | Performed by: PHYSICAL THERAPIST

## 2020-09-10 ENCOUNTER — HOSPITAL ENCOUNTER (OUTPATIENT)
Dept: PHYSICAL THERAPY | Facility: HOSPITAL | Age: 35
Setting detail: THERAPIES SERIES
End: 2020-09-10
Attending: PHYSICIAN ASSISTANT
Payer: COMMERCIAL

## 2020-09-10 PROCEDURE — 97112 NEUROMUSCULAR REEDUCATION: CPT | Mod: GP

## 2020-09-10 PROCEDURE — 97110 THERAPEUTIC EXERCISES: CPT | Mod: GP

## 2020-09-15 ENCOUNTER — HOSPITAL ENCOUNTER (OUTPATIENT)
Dept: PHYSICAL THERAPY | Facility: HOSPITAL | Age: 35
Setting detail: THERAPIES SERIES
End: 2020-09-15
Attending: PHYSICIAN ASSISTANT
Payer: COMMERCIAL

## 2020-09-15 PROCEDURE — 97110 THERAPEUTIC EXERCISES: CPT | Mod: GP

## 2020-09-24 ENCOUNTER — HOSPITAL ENCOUNTER (OUTPATIENT)
Dept: PHYSICAL THERAPY | Facility: HOSPITAL | Age: 35
Setting detail: THERAPIES SERIES
End: 2020-09-24
Attending: PHYSICIAN ASSISTANT
Payer: COMMERCIAL

## 2020-09-24 PROCEDURE — 97110 THERAPEUTIC EXERCISES: CPT | Mod: GP | Performed by: PHYSICAL THERAPIST

## 2020-09-24 PROCEDURE — 97112 NEUROMUSCULAR REEDUCATION: CPT | Mod: GP | Performed by: PHYSICAL THERAPIST

## 2020-10-08 ENCOUNTER — MYC REFILL (OUTPATIENT)
Dept: PSYCHIATRY | Facility: OTHER | Age: 35
End: 2020-10-08

## 2020-10-08 DIAGNOSIS — F90.2 ADHD (ATTENTION DEFICIT HYPERACTIVITY DISORDER), COMBINED TYPE: ICD-10-CM

## 2020-10-08 RX ORDER — LISDEXAMFETAMINE DIMESYLATE 60 MG/1
60 CAPSULE ORAL DAILY
Qty: 30 CAPSULE | Refills: 0 | Status: CANCELLED | OUTPATIENT
Start: 2020-10-08

## 2020-11-09 ENCOUNTER — MYC REFILL (OUTPATIENT)
Dept: PSYCHIATRY | Facility: OTHER | Age: 35
End: 2020-11-09

## 2020-11-09 DIAGNOSIS — F90.2 ADHD (ATTENTION DEFICIT HYPERACTIVITY DISORDER), COMBINED TYPE: ICD-10-CM

## 2020-11-09 RX ORDER — LISDEXAMFETAMINE DIMESYLATE 60 MG/1
60 CAPSULE ORAL DAILY
Qty: 30 CAPSULE | Refills: 0 | Status: SHIPPED | OUTPATIENT
Start: 2020-11-09 | End: 2020-12-09

## 2020-11-09 NOTE — TELEPHONE ENCOUNTER
Italo      Last Written Prescription Date:  10/09/20  Last Fill Quantity: 30,   # refills: 0  Last Office Visit: 01/07/2019

## 2020-12-07 NOTE — PROGRESS NOTES
Subjective     Vesna Fabian is a 35 year old female who presents to clinic today for the following health issues:    HPI          Depression Followup    How are you doing with your depression since your last visit? No change    Are you having other symptoms that might be associated with depression? No    Have you had a significant life event?  No     Are you feeling anxious or having panic attacks?   No    Do you have any concerns with your use of alcohol or other drugs? No    Social History     Tobacco Use     Smoking status: Former Smoker     Years: 10.00     Types: Cigarettes     Quit date: 2020     Years since quittin.8     Smokeless tobacco: Never Used     Tobacco comment: quit . longest tobacco free 1 year. no passive exposure   Substance Use Topics     Alcohol use: Yes     Comment: rarely     Drug use: No     PHQ 2020 7/15/2020 2020   PHQ-9 Total Score 6 1 7   Q9: Thoughts of better off dead/self-harm past 2 weeks Not at all Not at all Not at all   F/U: Thoughts of suicide or self-harm - - -   F/U: Safety concerns - - -     JAZZ-7 SCORE 7/15/2020 2020 2020   Total Score 1 0 1     Last PHQ-9 2020   1.  Little interest or pleasure in doing things 0   2.  Feeling down, depressed, or hopeless 1   3.  Trouble falling or staying asleep, or sleeping too much 1   4.  Feeling tired or having little energy 3   5.  Poor appetite or overeating 0   6.  Feeling bad about yourself 0   7.  Trouble concentrating 2   8.  Moving slowly or restless 0   Q9: Thoughts of better off dead/self-harm past 2 weeks 0   PHQ-9 Total Score 7   Difficulty at work, home, or with people -   In the past two weeks have you had thoughts of suicide or self harm? -   Do you have concerns about your personal safety or the safety of others? -     JAZZ-7  2020   1. Feeling nervous, anxious, or on edge 0   2. Not being able to stop or control worrying 0   3. Worrying too much about different things 0   4.  Trouble relaxing 0   5. Being so restless that it is hard to sit still 0   6. Becoming easily annoyed or irritable 1   7. Feeling afraid, as if something awful might happen 0   JAZZ-7 Total Score 1   If you checked any problems, how difficult have they made it for you to do your work, take care of things at home, or get along with other people? -         Suicide Assessment Five-step Evaluation and Treatment (SAFE-T)    Hypothyroidism Follow-up      Since last visit, patient describes the following symptoms: fatigue and hair loss        Review of Systems   Constitutional, HEENT, cardiovascular, pulmonary, gi and gu systems are negative, except as otherwise noted.      Objective    /60 (BP Location: Right arm, Patient Position: Sitting, Cuff Size: Adult Regular)   Pulse 84   Temp 98.9  F (37.2  C) (Tympanic)   Wt 82.6 kg (182 lb)   SpO2 98%   BMI 30.29 kg/m    Body mass index is 30.29 kg/m .  Physical Exam   GENERAL: healthy, alert and no distress  EYES: Eyes grossly normal to inspection, PERRL and conjunctivae and sclerae normal  HENT: ear canals and TM's normal, nose and mouth without ulcers or lesions  NECK: no adenopathy, no asymmetry, masses, or scars and thyroid normal to palpation  RESP: lungs clear to auscultation - no rales, rhonchi or wheezes  CV: regular rate and rhythm, normal S1 S2, no S3 or S4, no murmur, click or rub, no peripheral edema and peripheral pulses strong  ABDOMEN: soft, nontender, no hepatosplenomegaly, no masses and bowel sounds normal  MS: no gross musculoskeletal defects noted, no edema  SKIN: no suspicious lesions or rashes, has extra facial hair.   NEURO: Normal strength and tone, mentation intact and speech normal  LYMPH: no cervical, supraclavicular, axillary, or inguinal adenopathy  Psyche doing better with her emotions and coping skills. With the same partner.        No results found for this or any previous visit (from the past 24 hour(s)).        Assessment & Plan  "    ADHD (attention deficit hyperactivity disorder), combined type  She is going to be given refill of Vyvance.    - lisdexamfetamine (VYVANSE) 60 MG capsule; Take 1 capsule (60 mg) by mouth daily    Fatigue, unspecified type  Chronic with hair loss and really struggling .    - TSH with free T4 reflex  - Thyroid peroxidase antibody    Hair loss  Thinning but only this no patches of loss. Mild vertex balding.   - TSH with free T4 reflex  - Thyroid peroxidase antibody     BMI:   Estimated body mass index is 30.29 kg/m  as calculated from the following:    Height as of 7/10/20: 1.651 m (5' 5\").    Weight as of this encounter: 82.6 kg (182 lb).            See Patient Instructions    No follow-ups on file.    PRUDECNE Jj  [facility] - [place]    "

## 2020-12-09 ENCOUNTER — OFFICE VISIT (OUTPATIENT)
Dept: FAMILY MEDICINE | Facility: OTHER | Age: 35
End: 2020-12-09
Attending: PHYSICIAN ASSISTANT
Payer: COMMERCIAL

## 2020-12-09 VITALS
WEIGHT: 182 LBS | DIASTOLIC BLOOD PRESSURE: 60 MMHG | HEART RATE: 84 BPM | BODY MASS INDEX: 30.29 KG/M2 | OXYGEN SATURATION: 98 % | SYSTOLIC BLOOD PRESSURE: 110 MMHG | TEMPERATURE: 98.9 F

## 2020-12-09 DIAGNOSIS — F90.2 ADHD (ATTENTION DEFICIT HYPERACTIVITY DISORDER), COMBINED TYPE: ICD-10-CM

## 2020-12-09 DIAGNOSIS — R53.83 FATIGUE, UNSPECIFIED TYPE: Primary | ICD-10-CM

## 2020-12-09 DIAGNOSIS — L65.9 HAIR LOSS: ICD-10-CM

## 2020-12-09 LAB — TSH SERPL DL<=0.005 MIU/L-ACNC: 1.71 MU/L (ref 0.4–4)

## 2020-12-09 PROCEDURE — 84443 ASSAY THYROID STIM HORMONE: CPT | Performed by: PHYSICIAN ASSISTANT

## 2020-12-09 PROCEDURE — 86376 MICROSOMAL ANTIBODY EACH: CPT | Performed by: PHYSICIAN ASSISTANT

## 2020-12-09 PROCEDURE — 36415 COLL VENOUS BLD VENIPUNCTURE: CPT | Performed by: PHYSICIAN ASSISTANT

## 2020-12-09 PROCEDURE — 99214 OFFICE O/P EST MOD 30 MIN: CPT | Performed by: PHYSICIAN ASSISTANT

## 2020-12-09 RX ORDER — LISDEXAMFETAMINE DIMESYLATE 60 MG/1
60 CAPSULE ORAL DAILY
Qty: 30 CAPSULE | Refills: 0 | Status: SHIPPED | OUTPATIENT
Start: 2020-12-09 | End: 2021-01-06

## 2020-12-09 ASSESSMENT — ANXIETY QUESTIONNAIRES
2. NOT BEING ABLE TO STOP OR CONTROL WORRYING: NOT AT ALL
7. FEELING AFRAID AS IF SOMETHING AWFUL MIGHT HAPPEN: NOT AT ALL
GAD7 TOTAL SCORE: 0
3. WORRYING TOO MUCH ABOUT DIFFERENT THINGS: NOT AT ALL
5. BEING SO RESTLESS THAT IT IS HARD TO SIT STILL: NOT AT ALL
4. TROUBLE RELAXING: NOT AT ALL
GAD7 TOTAL SCORE: 1
1. FEELING NERVOUS, ANXIOUS, OR ON EDGE: NOT AT ALL
7. FEELING AFRAID AS IF SOMETHING AWFUL MIGHT HAPPEN: NOT AT ALL
1. FEELING NERVOUS, ANXIOUS, OR ON EDGE: NOT AT ALL
6. BECOMING EASILY ANNOYED OR IRRITABLE: NOT AT ALL
2. NOT BEING ABLE TO STOP OR CONTROL WORRYING: NOT AT ALL
3. WORRYING TOO MUCH ABOUT DIFFERENT THINGS: NOT AT ALL
6. BECOMING EASILY ANNOYED OR IRRITABLE: SEVERAL DAYS
5. BEING SO RESTLESS THAT IT IS HARD TO SIT STILL: NOT AT ALL
4. TROUBLE RELAXING: NOT AT ALL

## 2020-12-09 ASSESSMENT — PAIN SCALES - GENERAL: PAINLEVEL: NO PAIN (0)

## 2020-12-09 ASSESSMENT — PATIENT HEALTH QUESTIONNAIRE - PHQ9: SUM OF ALL RESPONSES TO PHQ QUESTIONS 1-9: 7

## 2020-12-09 NOTE — NURSING NOTE
"Chief Complaint   Patient presents with     medication review       Initial /60 (BP Location: Right arm, Patient Position: Sitting, Cuff Size: Adult Regular)   Pulse 84   Temp 98.9  F (37.2  C) (Tympanic)   Wt 82.6 kg (182 lb)   SpO2 98%   BMI 30.29 kg/m   Estimated body mass index is 30.29 kg/m  as calculated from the following:    Height as of 7/10/20: 1.651 m (5' 5\").    Weight as of this encounter: 82.6 kg (182 lb).  Medication Reconciliation: complete  Vesna Kahn LPN  "

## 2020-12-10 ASSESSMENT — ANXIETY QUESTIONNAIRES: GAD7 TOTAL SCORE: 1

## 2020-12-11 LAB — THYROPEROXIDASE AB SERPL-ACNC: 19 IU/ML

## 2020-12-20 ENCOUNTER — HEALTH MAINTENANCE LETTER (OUTPATIENT)
Age: 35
End: 2020-12-20

## 2020-12-31 NOTE — PROGRESS NOTES
SUBJECTIVE:   CC: Vesna Fabian is an 35 year old woman who presents for preventive health visit.       Patient has been advised of split billing requirements and indicates understanding: Yes  Healthy Habits:    Do you get at least three servings of calcium containing foods daily (dairy, green leafy vegetables, etc.)? yes    Amount of exercise or daily activities, outside of work: walks daily    Problems taking medications regularly No    Medication side effects: No    Have you had an eye exam in the past two years? yes    Do you see a dentist twice per year? no    Do you have sleep apnea, excessive snoring or daytime drowsiness?tired all day long      Depression and Anxiety Follow-Up    How are you doing with your depression since your last visit? No change    How are you doing with your anxiety since your last visit?  No change    Are you having other symptoms that might be associated with depression or anxiety? No    Have you had a significant life event? No     Do you have any concerns with your use of alcohol or other drugs? No    Social History     Tobacco Use     Smoking status: Former Smoker     Years: 10.00     Types: Cigarettes     Quit date: 2020     Years since quittin.9     Smokeless tobacco: Never Used     Tobacco comment: quit . longest tobacco free 1 year. no passive exposure   Substance Use Topics     Alcohol use: Yes     Comment: rarely     Drug use: No     PHQ 7/15/2020 2020 2021   PHQ-9 Total Score 1 7 7   Q9: Thoughts of better off dead/self-harm past 2 weeks Not at all Not at all Not at all   F/U: Thoughts of suicide or self-harm - - -   F/U: Safety concerns - - -     JAZZ-7 SCORE 2020   Total Score 0 1 1     Last PHQ-9 2021   1.  Little interest or pleasure in doing things 0   2.  Feeling down, depressed, or hopeless 0   3.  Trouble falling or staying asleep, or sleeping too much 2   4.  Feeling tired or having little energy 3   5.  Poor  appetite or overeating 0   6.  Feeling bad about yourself 0   7.  Trouble concentrating 2   8.  Moving slowly or restless 0   Q9: Thoughts of better off dead/self-harm past 2 weeks 0   PHQ-9 Total Score 7   Difficulty at work, home, or with people -   In the past two weeks have you had thoughts of suicide or self harm? -   Do you have concerns about your personal safety or the safety of others? -     JAZZ-7  2021   1. Feeling nervous, anxious, or on edge 0   2. Not being able to stop or control worrying 0   3. Worrying too much about different things 0   4. Trouble relaxing 0   5. Being so restless that it is hard to sit still 0   6. Becoming easily annoyed or irritable 1   7. Feeling afraid, as if something awful might happen 0   JAZZ-7 Total Score 1   If you checked any problems, how difficult have they made it for you to do your work, take care of things at home, or get along with other people? -       Suicide Assessment Five-step Evaluation and Treatment (SAFE-T)      Today's PHQ-2 Score:   PHQ-2 (  [employer]) 2020   Q1: Little interest or pleasure in doing things 0 1   Q2: Feeling down, depressed or hopeless 0 0   PHQ-2 Score 0 1       Abuse: Current or Past(Physical, Sexual or Emotional)- No  Do you feel safe in your environment? Yes        Social History     Tobacco Use     Smoking status: Former Smoker     Years: 10.00     Types: Cigarettes     Quit date: 2020     Years since quittin.9     Smokeless tobacco: Never Used     Tobacco comment: quit . longest tobacco free 1 year. no passive exposure   Substance Use Topics     Alcohol use: Yes     Comment: rarely     If you drink alcohol do you typically have >3 drinks per day or >7 drinks per week? No                     Reviewed orders with patient.  Reviewed health maintenance and updated orders accordingly - Yes  Labs reviewed in EPIC  BP Readings from Last 3 Encounters:   21 108/70   20 110/60   20 110/62    Wt  Readings from Last 3 Encounters:   21 86.2 kg (190 lb)   20 82.6 kg (182 lb)   20 86.6 kg (191 lb)                  Patient Active Problem List   Diagnosis     Family planning     Moderate major depression (H)     Anxiety     Chronic fatigue     Encounter for routine gynecological examination     Decreased libido     Fatigue     Smoker     ASCUS (atypical squamous cells of undetermined significance) on Pap smear     NO SHOW     ACP (advance care planning)     Ligament tear     Past Surgical History:   Procedure Laterality Date     cyst ovary      LT     MAMMOPLASTY REDUCTION       REPAIR TENDON PATELLA Left 7/10/2020    Procedure: Left knee patellorfemoral ligament repair;  Surgeon: Melchor Perkins DO;  Location: [facility]     wisdom teeth removed         Social History     Tobacco Use     Smoking status: Former Smoker     Years: 10.00     Types: Cigarettes     Quit date: 2020     Years since quittin.9     Smokeless tobacco: Never Used     Tobacco comment: quit . longest tobacco free 1 year. no passive exposure   Substance Use Topics     Alcohol use: Yes     Comment: rarely     Family History   Problem Relation Age of Onset     Cancer Maternal Grandmother         pancreatic cancer; cause of death     Cancer Father         rectal     Colon Cancer Father      Heart Disease Paternal Grandmother         heart disease     Hypertension Mother          Current Outpatient Medications   Medication Sig Dispense Refill     lisdexamfetamine (VYVANSE) 60 MG capsule Take 1 capsule (60 mg) by mouth daily 30 capsule 0     No Known Allergies    Mammogram not appropriate for this patient based on age.    Pertinent mammograms are reviewed under the imaging tab.  History of abnormal Pap smear:   Last 3 Pap Results:   PAP (no units)   Date Value   2014 ASC-US (A)   2014 NIL     Last 3 Pap and HPV Results:   PAP / HPV 2014   PAP ASC-US(A) NIL   HPV HIGH RISK DNA PROBE  Negative  Reference range: Negative  (Note)  INTERPRETIVE INFORMATION: HPV High Risk, Hybrid Capture,  Brush    This test detects the high-risk HPV genotypes 16, 18, 31,  33, 35, 39, 45, 51, 52, 56, 58, 59 and 68 associated with  cervical cancer and its precursor lesions. However,  cross-reactions with other genotypes may occur. Results  should be correlated with cytologic and histologic  findings.  Sensitivity may be affected by cellularity of  specimen.    This test is intended for medical purposes only and is not  valid for the evaluation of suspected sexual abuse or for  other forensic purposes.    HPV testing should not be used for screening or management  of atypical squamous cells of undetermined significance  (ASCUS) in women under age 21.  Performed by [facility],  [address] [facility],[place] [id number] [phone]  [email or web address], Jose Mckinnon MD, Lab. Director   Negative  Reference range: Negative  (Note)  INTERPRETIVE INFORMATION: HPV High Risk, Hybrid Capture,  Brush    This test detects the high-risk HPV genotypes 16, 18, 31,  33, 35, 39, 45, 51, 52, 56, 58, 59 and 68 associated with  cervical cancer and its precursor lesions. However,  cross-reactions with other genotypes may occur. Results  should be correlated with cytologic and histologic  findings.  Sensitivity may be affected by cellularity of  specimen.    This test is intended for medical purposes only and is not  valid for the evaluation of suspected sexual abuse or for  other forensic purposes.    HPV testing should not be used for screening or management  of atypical squamous cells of undetermined significance  (ASCUS) in women under age 21.  Performed by [facility],  [address] [facility] [place] [address] [phone]  [email or web address], Jose Mckinnon MD, Lab. Director       PAP / HPV 5/14/2014 4/21/2014   PAP ASC-US(A) NIL   HPV HIGH RISK DNA PROBE Negative  Reference range: Negative  (Note)  INTERPRETIVE INFORMATION: HPV High Risk,  Hybrid Capture,  Brush    This test detects the high-risk HPV genotypes 16, 18, 31,  33, 35, 39, 45, 51, 52, 56, 58, 59 and 68 associated with  cervical cancer and its precursor lesions. However,  cross-reactions with other genotypes may occur. Results  should be correlated with cytologic and histologic  findings.  Sensitivity may be affected by cellularity of  specimen.    This test is intended for medical purposes only and is not  valid for the evaluation of suspected sexual abuse or for  other forensic purposes.    HPV testing should not be used for screening or management  of atypical squamous cells of undetermined significance  (ASCUS) in women under age 21.  Performed by [email or web address],  [address],[place] [id number] [phone]  [email or web address], Jose Mckinnon MD, Lab. Director   Negative  Reference range: Negative  (Note)  INTERPRETIVE INFORMATION: HPV High Risk, Hybrid Capture,  Brush    This test detects the high-risk HPV genotypes 16, 18, 31,  33, 35, 39, 45, 51, 52, 56, 58, 59 and 68 associated with  cervical cancer and its precursor lesions. However,  cross-reactions with other genotypes may occur. Results  should be correlated with cytologic and histologic  findings.  Sensitivity may be affected by cellularity of  specimen.    This test is intended for medical purposes only and is not  valid for the evaluation of suspected sexual abuse or for  other forensic purposes.    HPV testing should not be used for screening or management  of atypical squamous cells of undetermined significance  (ASCUS) in women under age 21.  Performed by [email or web address],  [address],[place] [id number] [phone]  [email or web address], Jose Mckinnon MD, Lab. Director       Reviewed and updated as needed this visit by clinical staff  Tobacco  Allergies  Meds   Med Hx  Surg Hx  Fam Hx          Reviewed and updated as needed this visit by Provider                  Past Medical History:   Diagnosis Date      "Attention deficit disorder without mention of hyperactivity 2000     Bipolar affective disorder 2012     Bulimia 2012     Self-injurious behavior 2012     Suicide attempt 2012      Past Surgical History:   Procedure Laterality Date     cyst ovary      LT     MAMMOPLASTY REDUCTION  2011     REPAIR TENDON PATELLA Left 7/10/2020    Procedure: Left knee patellorfemoral ligament repair;  Surgeon: Melchor Perkins DO;  Location: [facility]     wisdom teeth removed       OB History    Para Term  AB Living   2 1 1 0 1 1   SAB TAB Ectopic Multiple Live Births   1 0 0 0 1      # Outcome Date GA Lbr Ajit/2nd Weight Sex Delivery Anes PTL Lv   2 SAB 13 4w0d    SAB      1 Term 06   3.6 kg (7 lb 15 oz) F Vag-Spont   VILLA      Birth Comments: tear      Name: Rafael       ROS:  CONSTITUTIONAL: NEGATIVE for fever, chills, change in weight  INTEGUMENTARU/SKIN: NEGATIVE for worrisome rashes, moles or lesions  EYES: NEGATIVE for vision changes or irritation  ENT: NEGATIVE for ear, mouth and throat problems  RESP: NEGATIVE for significant cough or SOB  BREAST: NEGATIVE for masses, tenderness or discharge  CV: NEGATIVE for chest pain, palpitations or peripheral edema  GI: NEGATIVE for nausea, abdominal pain, heartburn, or change in bowel habits  : NEGATIVE for unusual urinary or vaginal symptoms. Periods are regular.  MUSCULOSKELETAL: NEGATIVE for significant arthralgias or myalgia  NEURO: NEGATIVE for weakness, dizziness or paresthesias  ENDOCRINE: NEGATIVE for temperature intolerance, skin/hair changes  HEME/ALLERGY/IMMUNE: NEGATIVE for bleeding problems  PSYCHIATRIC: NEGATIVE for changes in mood or affect    OBJECTIVE:   /70 (BP Location: Right arm, Patient Position: Sitting, Cuff Size: Adult Regular)   Pulse 82   Temp 97.3  F (36.3  C) (Tympanic)   Ht 1.651 m (5' 5\")   Wt 86.2 kg (190 lb)   LMP 2021   SpO2 98%   BMI 31.62 kg/m    EXAM:  GENERAL: healthy, alert and no " distress  EYES: Eyes grossly normal to inspection, PERRL and conjunctivae and sclerae normal  HENT: ear canals and TM's normal, nose and mouth without ulcers or lesions  NECK: no adenopathy, no asymmetry, masses, or scars and thyroid normal to palpation  RESP: lungs clear to auscultation - no rales, rhonchi or wheezes  BREAST: normal without masses, tenderness or nipple discharge and no palpable axillary masses or adenopathy  CV: regular rate and rhythm, normal S1 S2, no S3 or S4, no murmur, click or rub, no peripheral edema and peripheral pulses strong  ABDOMEN: soft, nontender, no hepatosplenomegaly, no masses and bowel sounds normal  MS: no gross musculoskeletal defects noted, no edema  SKIN: no suspicious lesions or rashes  NEURO: Normal strength and tone, mentation intact and speech normal  PSYCH: mentation appears normal, affect normal/bright  LYMPH: no cervical, supraclavicular, axillary, or inguinal adenopathy    Diagnostic Test Results:  Labs reviewed in Epic  No results found for this or any previous visit (from the past 24 hour(s)).    ASSESSMENT/PLAN:   1. Well woman exam with routine gynecological exam  All her screening is done. Fatigue Is overwhelming. Vyvance placed. Has very low vit d and very low ferritin. Given recommendations and will see he rback in 6 weeks. Will also see her back and do levels and discussion held with Vesna.  Needing covid scfeening that was also placed.   - HPV High Risk Types DNA Cervical  - A pap thin layer screen with  HPV - recommended age 30 - 65 years (select HPV order below)    Patient has been advised of split billing requirements and indicates understanding: Yes  COUNSELING:   Reviewed preventive health counseling, as reflected in patient instructions       Regular exercise       Vision screening       Hearing screening       Immunizations    Vaccinated for: Pneumococcal and TDAP             Aspirin prophylaxis       Safe sex practices/STD prevention       Advance  "Care Planning    Estimated body mass index is 31.62 kg/m  as calculated from the following:    Height as of this encounter: 1.651 m (5' 5\").    Weight as of this encounter: 86.2 kg (190 lb).        She reports that she quit smoking about a year ago. Her smoking use included cigarettes. She quit after 10.00 years of use. She has never used smokeless tobacco.      Counseling Resources:  ATP IV Guidelines  Pooled Cohorts Equation Calculator  Breast Cancer Risk Calculator  BRCA-Related Cancer Risk Assessment: FHS-7 Tool  FRAX Risk Assessment  ICSI Preventive Guidelines  Dietary Guidelines for Americans, 2010  USDA's MyPlate  ASA Prophylaxis  Lung CA Screening    PRUDENCE Jj  [facility] [place]  "

## 2021-01-06 ENCOUNTER — OFFICE VISIT (OUTPATIENT)
Dept: FAMILY MEDICINE | Facility: OTHER | Age: 36
End: 2021-01-06
Attending: PHYSICIAN ASSISTANT
Payer: COMMERCIAL

## 2021-01-06 VITALS
HEIGHT: 65 IN | OXYGEN SATURATION: 98 % | DIASTOLIC BLOOD PRESSURE: 70 MMHG | HEART RATE: 82 BPM | TEMPERATURE: 97.3 F | BODY MASS INDEX: 31.65 KG/M2 | SYSTOLIC BLOOD PRESSURE: 108 MMHG | WEIGHT: 190 LBS

## 2021-01-06 DIAGNOSIS — Z01.419 WELL WOMAN EXAM WITH ROUTINE GYNECOLOGICAL EXAM: Primary | ICD-10-CM

## 2021-01-06 DIAGNOSIS — Z86.19 HISTORY OF CHICKEN POX: ICD-10-CM

## 2021-01-06 DIAGNOSIS — Z23 NEED FOR PNEUMOCOCCAL VACCINE: ICD-10-CM

## 2021-01-06 DIAGNOSIS — F90.2 ADHD (ATTENTION DEFICIT HYPERACTIVITY DISORDER), COMBINED TYPE: ICD-10-CM

## 2021-01-06 DIAGNOSIS — E55.9 VITAMIN D DEFICIENCY: ICD-10-CM

## 2021-01-06 DIAGNOSIS — Z23 NEED FOR TD VACCINE: ICD-10-CM

## 2021-01-06 DIAGNOSIS — R53.82 CHRONIC FATIGUE: ICD-10-CM

## 2021-01-06 DIAGNOSIS — R79.0 DECREASED FERRITIN: ICD-10-CM

## 2021-01-06 LAB — FERRITIN SERPL-MCNC: 9 NG/ML (ref 12–150)

## 2021-01-06 PROCEDURE — 90714 TD VACC NO PRESV 7 YRS+ IM: CPT | Performed by: PHYSICIAN ASSISTANT

## 2021-01-06 PROCEDURE — 90471 IMMUNIZATION ADMIN: CPT | Performed by: PHYSICIAN ASSISTANT

## 2021-01-06 PROCEDURE — 87624 HPV HI-RISK TYP POOLED RSLT: CPT | Performed by: PHYSICIAN ASSISTANT

## 2021-01-06 PROCEDURE — 36415 COLL VENOUS BLD VENIPUNCTURE: CPT | Performed by: PHYSICIAN ASSISTANT

## 2021-01-06 PROCEDURE — G0123 SCREEN CERV/VAG THIN LAYER: HCPCS | Performed by: PHYSICIAN ASSISTANT

## 2021-01-06 PROCEDURE — 90732 PPSV23 VACC 2 YRS+ SUBQ/IM: CPT | Performed by: PHYSICIAN ASSISTANT

## 2021-01-06 PROCEDURE — 82728 ASSAY OF FERRITIN: CPT | Performed by: PHYSICIAN ASSISTANT

## 2021-01-06 PROCEDURE — 90472 IMMUNIZATION ADMIN EACH ADD: CPT | Performed by: PHYSICIAN ASSISTANT

## 2021-01-06 PROCEDURE — 86787 VARICELLA-ZOSTER ANTIBODY: CPT | Performed by: PHYSICIAN ASSISTANT

## 2021-01-06 PROCEDURE — 99395 PREV VISIT EST AGE 18-39: CPT | Mod: 25 | Performed by: PHYSICIAN ASSISTANT

## 2021-01-06 PROCEDURE — 82607 VITAMIN B-12: CPT | Performed by: PHYSICIAN ASSISTANT

## 2021-01-06 PROCEDURE — 82306 VITAMIN D 25 HYDROXY: CPT | Performed by: PHYSICIAN ASSISTANT

## 2021-01-06 RX ORDER — LISDEXAMFETAMINE DIMESYLATE 60 MG/1
60 CAPSULE ORAL DAILY
Qty: 30 CAPSULE | Refills: 0 | Status: SHIPPED | OUTPATIENT
Start: 2021-01-06 | End: 2021-02-02

## 2021-01-06 ASSESSMENT — ANXIETY QUESTIONNAIRES
6. BECOMING EASILY ANNOYED OR IRRITABLE: SEVERAL DAYS
4. TROUBLE RELAXING: NOT AT ALL
5. BEING SO RESTLESS THAT IT IS HARD TO SIT STILL: NOT AT ALL
3. WORRYING TOO MUCH ABOUT DIFFERENT THINGS: NOT AT ALL
1. FEELING NERVOUS, ANXIOUS, OR ON EDGE: NOT AT ALL
7. FEELING AFRAID AS IF SOMETHING AWFUL MIGHT HAPPEN: NOT AT ALL
GAD7 TOTAL SCORE: 1
2. NOT BEING ABLE TO STOP OR CONTROL WORRYING: NOT AT ALL

## 2021-01-06 ASSESSMENT — MIFFLIN-ST. JEOR: SCORE: 1547.71

## 2021-01-06 ASSESSMENT — PATIENT HEALTH QUESTIONNAIRE - PHQ9: SUM OF ALL RESPONSES TO PHQ QUESTIONS 1-9: 7

## 2021-01-06 ASSESSMENT — PAIN SCALES - GENERAL: PAINLEVEL: NO PAIN (0)

## 2021-01-06 NOTE — NURSING NOTE
"Chief Complaint   Patient presents with     Physical       Initial /70 (BP Location: Right arm, Patient Position: Sitting, Cuff Size: Adult Regular)   Pulse 82   Temp 97.3  F (36.3  C) (Tympanic)   Ht 1.651 m (5' 5\")   Wt 86.2 kg (190 lb)   LMP 01/03/2021   SpO2 98%   BMI 31.62 kg/m   Estimated body mass index is 31.62 kg/m  as calculated from the following:    Height as of this encounter: 1.651 m (5' 5\").    Weight as of this encounter: 86.2 kg (190 lb).  Medication Reconciliation: complete  Vesna Kahn LPN  "

## 2021-01-06 NOTE — LETTER
My Depression Action Plan  Name: Vesna Fabian   Date of Birth 1985  Date: 1/6/2021    My doctor: Dixie Chun   My clinic: [facility] [place]  [facility] [address] [place] [address]  [phone]          GREEN    ZONE   Good Control    What it looks like:     Things are going generally well. You have normal ups and downs. You may even feel depressed from time to time, but bad moods usually last less than a day.   What you need to do:  1. Continue to care for yourself (see self care plan)  2. Check your depression survival kit and update it as needed  3. Follow your physician s recommendations including any medication.  4. Do not stop taking medication unless you consult with your physician first.           YELLOW         ZONE Getting Worse    What it looks like:     Depression is starting to interfere with your life.     It may be hard to get out of bed; you may be starting to isolate yourself from others.    Symptoms of depression are starting to last most all day and this has happened for several days.     You may have suicidal thoughts but they are not constant.   What you need to do:     1. Call your care team. Your response to treatment will improve if you keep your care team informed of your progress. Yellow periods are signs an adjustment may need to be made.     2. Continue your self-care.  Just get dressed and ready for the day.  Don't give yourself time to talk yourself out of it.    3. Talk to someone in your support network.    4. Open up your Depression Self-Care Plan/Wellness Kit.           RED    ZONE Medical Alert - Get Help    What it looks like:     Depression is seriously interfering with your life.     You may experience these or other symptoms: You can t get out of bed most days, can t work or engage in other necessary activities, you have trouble taking care of basic hygiene, or basic responsibilities, thoughts of suicide or death that will not go away,  self-injurious behavior.     What you need to do:  1. Call your care team and request a same-day appointment. If they are not available (weekends or after hours) call your local crisis line, emergency room or 911.            Depression Self-Care Plan / Wellness Kit    Self-Care for Depression  Here s the deal. Your body and mind are really not as separate as most people think.  What you do and think affects how you feel and how you feel influences what you do and think. This means if you do things that people who feel good do, it will help you feel better.  Sometimes this is all it takes.  There is also a place for medication and therapy depending on how severe your depression is, so be sure to consult with your medical provider and/ or Behavioral Health Consultant if your symptoms are worsening or not improving.     In order to better manage my stress, I will:    Exercise  Get some form of exercise, every day. This will help reduce pain and release endorphins, the  feel good  chemicals in your brain. This is almost as good as taking antidepressants!  This is not the same as joining a gym and then never going! (they count on that by the way ) It can be as simple as just going for a walk or doing some gardening, anything that will get you moving.      Hygiene   Maintain good hygiene (get out of bed in the morning, make your bed, brush your teeth, take a shower, and get dressed like you were going to work, even if you are unemployed).  If your clothes don't fit try to get ones that do.    Diet  Strive to eat foods that are good for me, drink plenty of water, and avoid excessive sugar, caffeine, alcohol, and other mood-altering substances.  Some foods that are helpful in depression are: complex carbohydrates, B vitamins, flaxseed, fish or fish oil, fresh fruits and vegetables.    Psychotherapy  Agree to participate in Individual Therapy (if recommended).    Medication  If prescribed medications, I agree to take them.   Missing doses can result in serious side effects.  I understand that drinking alcohol, or other illicit drug use, may cause potential side effects.  I will not stop my medication abruptly without first discussing it with my provider.    Staying Connected With Others  Stay in touch with my friends, family members, and my primary care provider/team.    Use your imagination  Be creative.  We all have a creative side; it doesn t matter if it s oil painting, sand castles, or mud pies! This will also kick up the endorphins.    Witness Beauty  (AKA stop and smell the roses) Take a look outside, even in mid-winter. Notice colors, textures. Watch the squirrels and birds.     Service to others  Be of service to others.  There is always someone else in need.  By helping others we can  get out of ourselves  and remember the really important things.  This also provides opportunities for practicing all the other parts of the program.    Humor  Laugh and be silly!  Adjust your TV habits for less news and crime-drama and more comedy.    Control your stress  Try breathing deep, massage therapy, biofeedback, and meditation. Find time to relax each day.     Crisis Text Line  http://www.crisistextline.org    The Crisis Text Line serves anyone, in any type of crisis, providing access to free, 24/7 support and information via the medium people already use and trust:    Here's how it works:  1.  Text 135-041 from anywhere in the [place], anytime, about any type of crisis.  2.  A live, trained Crisis Counselor receives the text and responds quickly.  3.  The volunteer Crisis Counselor will help you move from a 'hot moment to a cool moment'.    My support system    Clinic Contact:  Phone number:    Contact 1:  Phone number:    Contact 2:  Phone number:    Rastafarian/:  Phone number:    Therapist:  Phone number:    Local crisis center:    Phone number:    Other community support:  Phone number:

## 2021-01-06 NOTE — LETTER
My Depression Action Plan  Name: Vesna Fabian   Date of Birth 1985  Date: 12/31/2020    My doctor: Dixie Chun   My clinic: [facility] [place]  [facility] [place] [address]  [phone]          GREEN    ZONE   Good Control    What it looks like:     Things are going generally well. You have normal ups and downs. You may even feel depressed from time to time, but bad moods usually last less than a day.   What you need to do:  1. Continue to care for yourself (see self care plan)  2. Check your depression survival kit and update it as needed  3. Follow your physician s recommendations including any medication.  4. Do not stop taking medication unless you consult with your physician first.           YELLOW         ZONE Getting Worse    What it looks like:     Depression is starting to interfere with your life.     It may be hard to get out of bed; you may be starting to isolate yourself from others.    Symptoms of depression are starting to last most all day and this has happened for several days.     You may have suicidal thoughts but they are not constant.   What you need to do:     1. Call your care team. Your response to treatment will improve if you keep your care team informed of your progress. Yellow periods are signs an adjustment may need to be made.     2. Continue your self-care.  Just get dressed and ready for the day.  Don't give yourself time to talk yourself out of it.    3. Talk to someone in your support network.    4. Open up your Depression Self-Care Plan/Wellness Kit.           RED    ZONE Medical Alert - Get Help    What it looks like:     Depression is seriously interfering with your life.     You may experience these or other symptoms: You can t get out of bed most days, can t work or engage in other necessary activities, you have trouble taking care of basic hygiene, or basic responsibilities, thoughts of suicide or death that will not go away,  self-injurious behavior.     What you need to do:  1. Call your care team and request a same-day appointment. If they are not available (weekends or after hours) call your local crisis line, emergency room or 911.            Depression Self-Care Plan / Wellness Kit    Self-Care for Depression  Here s the deal. Your body and mind are really not as separate as most people think.  What you do and think affects how you feel and how you feel influences what you do and think. This means if you do things that people who feel good do, it will help you feel better.  Sometimes this is all it takes.  There is also a place for medication and therapy depending on how severe your depression is, so be sure to consult with your medical provider and/ or Behavioral Health Consultant if your symptoms are worsening or not improving.     In order to better manage my stress, I will:    Exercise  Get some form of exercise, every day. This will help reduce pain and release endorphins, the  feel good  chemicals in your brain. This is almost as good as taking antidepressants!  This is not the same as joining a gym and then never going! (they count on that by the way ) It can be as simple as just going for a walk or doing some gardening, anything that will get you moving.      Hygiene   Maintain good hygiene (get out of bed in the morning, make your bed, brush your teeth, take a shower, and get dressed like you were going to work, even if you are unemployed).  If your clothes don't fit try to get ones that do.    Diet  Strive to eat foods that are good for me, drink plenty of water, and avoid excessive sugar, caffeine, alcohol, and other mood-altering substances.  Some foods that are helpful in depression are: complex carbohydrates, B vitamins, flaxseed, fish or fish oil, fresh fruits and vegetables.    Psychotherapy  Agree to participate in Individual Therapy (if recommended).    Medication  If prescribed medications, I agree to take them.   Missing doses can result in serious side effects.  I understand that drinking alcohol, or other illicit drug use, may cause potential side effects.  I will not stop my medication abruptly without first discussing it with my provider.    Staying Connected With Others  Stay in touch with my friends, family members, and my primary care provider/team.    Use your imagination  Be creative.  We all have a creative side; it doesn t matter if it s oil painting, sand castles, or mud pies! This will also kick up the endorphins.    Witness Beauty  (AKA stop and smell the roses) Take a look outside, even in mid-winter. Notice colors, textures. Watch the squirrels and birds.     Service to others  Be of service to others.  There is always someone else in need.  By helping others we can  get out of ourselves  and remember the really important things.  This also provides opportunities for practicing all the other parts of the program.    Humor  Laugh and be silly!  Adjust your TV habits for less news and crime-drama and more comedy.    Control your stress  Try breathing deep, massage therapy, biofeedback, and meditation. Find time to relax each day.     Crisis Text Line  http://www.crisistextline.org    The Crisis Text Line serves anyone, in any type of crisis, providing access to free, 24/7 support and information via the medium people already use and trust:    Here's how it works:  1.  Text 484-115 from anywhere in the [place], anytime, about any type of crisis.  2.  A live, trained Crisis Counselor receives the text and responds quickly.  3.  The volunteer Crisis Counselor will help you move from a 'hot moment to a cool moment'.    My support system    Clinic Contact:  Phone number:    Contact 1:  Phone number:    Contact 2:  Phone number:    [facility]/:  Phone number:    Therapist:  Phone number:    Local crisis center:    Phone number:    Other community support:  Phone number:

## 2021-01-07 LAB — VIT B12 SERPL-MCNC: 490 PG/ML (ref 193–986)

## 2021-01-07 ASSESSMENT — ANXIETY QUESTIONNAIRES: GAD7 TOTAL SCORE: 1

## 2021-01-08 PROBLEM — R79.0 DECREASED FERRITIN: Status: ACTIVE | Noted: 2021-01-08

## 2021-01-08 LAB
COPATH REPORT: NORMAL
DEPRECATED CALCIDIOL+CALCIFEROL SERPL-MC: 11 UG/L (ref 20–75)
PAP: NORMAL
VZV IGG SER QL IA: 0.6 AI (ref 0–0.8)

## 2021-01-08 RX ORDER — HEPARIN SODIUM (PORCINE) LOCK FLUSH IV SOLN 100 UNIT/ML 100 UNIT/ML
5 SOLUTION INTRAVENOUS
Status: CANCELLED | OUTPATIENT
Start: 2021-01-11

## 2021-01-08 RX ORDER — HEPARIN SODIUM,PORCINE 10 UNIT/ML
5 VIAL (ML) INTRAVENOUS
Status: CANCELLED | OUTPATIENT
Start: 2021-01-11

## 2021-01-08 RX ORDER — ERGOCALCIFEROL 1.25 MG/1
50000 CAPSULE, LIQUID FILLED ORAL WEEKLY
Qty: 12 CAPSULE | Refills: 3 | Status: SHIPPED | OUTPATIENT
Start: 2021-01-08 | End: 2022-01-12

## 2021-01-12 ENCOUNTER — APPOINTMENT (OUTPATIENT)
Dept: GENERAL RADIOLOGY | Facility: HOSPITAL | Age: 36
End: 2021-01-12
Attending: NURSE PRACTITIONER
Payer: COMMERCIAL

## 2021-01-12 ENCOUNTER — HOSPITAL ENCOUNTER (EMERGENCY)
Facility: HOSPITAL | Age: 36
Discharge: HOME | End: 2021-01-12
Attending: NURSE PRACTITIONER | Admitting: NURSE PRACTITIONER
Payer: COMMERCIAL

## 2021-01-12 VITALS
TEMPERATURE: 98.4 F | OXYGEN SATURATION: 97 % | DIASTOLIC BLOOD PRESSURE: 93 MMHG | SYSTOLIC BLOOD PRESSURE: 129 MMHG | RESPIRATION RATE: 16 BRPM | HEART RATE: 80 BPM

## 2021-01-12 DIAGNOSIS — J06.9 VIRAL UPPER RESPIRATORY TRACT INFECTION WITH COUGH: Primary | ICD-10-CM

## 2021-01-12 DIAGNOSIS — Z20.822 COVID-19 RULED OUT BY LABORATORY TESTING: ICD-10-CM

## 2021-01-12 DIAGNOSIS — R52 GENERALIZED BODY ACHES: ICD-10-CM

## 2021-01-12 LAB
FLUAV RNA RESP QL NAA+PROBE: NEGATIVE
FLUBV RNA RESP QL NAA+PROBE: NEGATIVE
LABORATORY COMMENT REPORT: NORMAL
RSV RNA SPEC QL NAA+PROBE: NEGATIVE
SARS-COV-2 RNA RESP QL NAA+PROBE: NEGATIVE
SPECIMEN SOURCE: NORMAL

## 2021-01-12 PROCEDURE — 99214 OFFICE O/P EST MOD 30 MIN: CPT | Performed by: NURSE PRACTITIONER

## 2021-01-12 PROCEDURE — 87636 SARSCOV2 & INF A&B AMP PRB: CPT | Performed by: NURSE PRACTITIONER

## 2021-01-12 PROCEDURE — 71045 X-RAY EXAM CHEST 1 VIEW: CPT

## 2021-01-12 PROCEDURE — C9803 HOPD COVID-19 SPEC COLLECT: HCPCS

## 2021-01-12 PROCEDURE — G0463 HOSPITAL OUTPT CLINIC VISIT: HCPCS | Mod: 25

## 2021-01-12 RX ORDER — BENZONATATE 100 MG/1
100 CAPSULE ORAL 3 TIMES DAILY PRN
Qty: 21 CAPSULE | Refills: 0 | Status: SHIPPED | OUTPATIENT
Start: 2021-01-12 | End: 2022-02-13

## 2021-01-12 ASSESSMENT — ENCOUNTER SYMPTOMS
COUGH: 1
MYALGIAS: 1
DIARRHEA: 0
SHORTNESS OF BREATH: 0
FEVER: 1
APPETITE CHANGE: 1
DYSURIA: 0
NAUSEA: 1
VOMITING: 0
ABDOMINAL PAIN: 0
TROUBLE SWALLOWING: 0

## 2021-01-12 NOTE — LETTER
[facility]  [address] [place] [id number] [phone]  Dept: [phone]      January 12, 2021      Patient: Vesna Fabian   YOB: 1985   Date of Visit: 1/12/2021       To Whom It May Concern:    Vesna Fabian was seen and treated in our Urgent Care on 1/12/2021.         Sincerely,         Prudence Mpofu, CNP

## 2021-01-12 NOTE — DISCHARGE INSTRUCTIONS
Your test came back negative for COVID-19 and influenza A and B.    Take Tessalon Perles as prescribed.  Continue pushing fluids, Tylenol or ibuprofen as needed on getting some rest. Take Mucinex.    Follow-up with your doctor as needed if no improvement in symptoms.    Return to emergency department for worsening concerning symptoms.

## 2021-01-12 NOTE — ED AVS SNAPSHOT
[facility]  [address] [address]  Phone: [phone]                                    Vesna Fabian   MRN: [id number]    Department: [facility]   Date of Visit: 1/12/2021           After Visit Summary Signature Page    I have received my discharge instructions, and my questions have been answered. I have discussed any challenges I see with this plan with the nurse or doctor.    ..........................................................................................................................................  Patient/Patient Representative Signature      ..........................................................................................................................................  Patient Representative Print Name and Relationship to Patient    ..................................................               ................................................  Date                                   Time    ..........................................................................................................................................  Reviewed by Signature/Title    ...................................................              ..............................................  Date                                               Time          22EPIC Rev 08/18

## 2021-01-12 NOTE — ED PROVIDER NOTES
History     Chief Complaint   Patient presents with     Pharyngitis     c/o sore throat, cough and body aches     HPI  Vesna Fabian is a 35 year old female who presents to urgent care during the current COVID-19 pandemic for  RESPIRATORY SYMPTOMS      Duration: 3 days ago    Description  cough, fever of up to 101  F, fatigue/malaise and myalgias    Severity: moderate, symptoms gradually worsening    Accompanying signs and symptoms: None    History (predisposing factors):  none    Precipitating or alleviating factors: None    Therapies tried and outcome:  rest and fluids, Robitussin, OTC cold and flu medications     Denies shortness of breath, chest pain, vomiting or diarrhea.  No known recent ill contacts.    Allergies:  No Known Allergies    Problem List:    Patient Active Problem List    Diagnosis Date Noted     Decreased ferritin 01/08/2021     Priority: Medium     Ligament tear 06/24/2020     Priority: Medium     Added automatically from request for surgery [id number]       ACP (advance care planning) 03/10/2016     Priority: Medium     Advance Care Planning 3/10/2016: ACP Review of Chart / Resources Provided:  Reviewed chart for advance care plan.  Vesna Fabian has been provided information and resources to begin or update their advance care plan.  Added by Kenisha Chilel             NO SHOW 06/18/2015     Priority: Medium     No shows for Dr. Valdez : 5/15/13; 4/29/15         ASCUS (atypical squamous cells of undetermined significance) on Pap smear 04/29/2014     Priority: Medium     Encounter for routine gynecological examination 04/21/2014     Priority: Medium     Problem list name updated by automated process. Provider to review       Decreased libido 04/21/2014     Priority: Medium     Sensate focus       Fatigue 04/21/2014     Priority: Medium     Smoker 04/21/2014     Priority: Medium     Ready to quit. Only 3 cigs in a week       Family planning 04/22/2013     Priority: Medium     Moderate major  depression (H) 2013     Priority: Medium     Denies suicidal or homicidal ideations at this time. Working with RAÚL Molina       Anxiety 2013     Priority: Medium     Chronic fatigue 2013     Priority: Medium        Past Medical History:    Past Medical History:   Diagnosis Date     Attention deficit disorder without mention of hyperactivity 2000     Bipolar affective disorder 2012     Bulimia 2012     Self-injurious behavior 2012     Suicide attempt 2012       Past Surgical History:    Past Surgical History:   Procedure Laterality Date     cyst ovary      LT     MAMMOPLASTY REDUCTION  2011     REPAIR TENDON PATELLA Left 7/10/2020    Procedure: Left knee patellorfemoral ligament repair;  Surgeon: Melchor Perkins DO;  Location: [facility]     wisdom teeth removed         Family History:    Family History   Problem Relation Age of Onset     Cancer Maternal Grandmother         pancreatic cancer; cause of death     Cancer Father         rectal     Colon Cancer Father      Heart Disease Paternal Grandmother         heart disease     Hypertension Mother        Social History:  Marital Status:  Single [1]  Social History     Tobacco Use     Smoking status: Former Smoker     Years: 10.00     Types: Cigarettes     Quit date: 2020     Years since quittin.9     Smokeless tobacco: Never Used     Tobacco comment: quit . longest tobacco free 1 year. no passive exposure   Substance Use Topics     Alcohol use: Yes     Comment: rarely     Drug use: No        Medications:         benzonatate (TESSALON) 100 MG capsule       lisdexamfetamine (VYVANSE) 60 MG capsule       vitamin D2 (ERGOCALCIFEROL) 91794 units (1250 mcg) capsule          Review of Systems   Constitutional: Positive for appetite change and fever.   HENT: Positive for congestion. Negative for trouble swallowing.    Respiratory: Positive for cough. Negative for shortness of breath.    Cardiovascular: Negative for chest pain.    Gastrointestinal: Positive for nausea. Negative for abdominal pain, diarrhea and vomiting.   Genitourinary: Negative for dysuria.   Musculoskeletal: Positive for myalgias.   All other systems reviewed and are negative.      Physical Exam   BP: 129/93  Pulse: 80  Temp: 98.4  F (36.9  C)  Resp: 16  SpO2: 97 %      Physical Exam  Vitals signs and nursing note reviewed.   Constitutional:       Appearance: She is well-developed. She is ill-appearing. She is not toxic-appearing or diaphoretic.   HENT:      Head: Normocephalic and atraumatic.      Right Ear: Tympanic membrane and ear canal normal.      Left Ear: Tympanic membrane and ear canal normal.      Nose: Congestion present.      Mouth/Throat:      Mouth: Mucous membranes are moist.      Pharynx: Uvula midline. Posterior oropharyngeal erythema present.      Tonsils: No tonsillar exudate or tonsillar abscesses. 0 on the right. 0 on the left.   Eyes:      Pupils: Pupils are equal, round, and reactive to light.   Cardiovascular:      Rate and Rhythm: Normal rate and regular rhythm.      Heart sounds: Normal heart sounds.   Pulmonary:      Effort: Pulmonary effort is normal. No respiratory distress.      Breath sounds: Normal breath sounds. No stridor. No wheezing, rhonchi or rales.   Abdominal:      General: Bowel sounds are normal. There is no distension.      Palpations: Abdomen is soft.      Tenderness: There is no abdominal tenderness. There is no guarding or rebound.   Lymphadenopathy:      Cervical: Cervical adenopathy present.   Skin:     General: Skin is warm and dry.      Capillary Refill: Capillary refill takes less than 2 seconds.   Neurological:      Mental Status: She is alert and oriented to person, place, and time.         ED Course        Procedures                 Results for orders placed or performed during the hospital encounter of 01/12/21 (from the past 24 hour(s))   Symptomatic Influenza A/B & SARS-CoV2 (COVID-19) Virus PCR Multiplex     Specimen: Nasopharyngeal   Result Value Ref Range    Flu A/B & SARS-COV-2 PCR Source Nasopharyngeal     SARS-CoV-2 PCR Result NEGATIVE     Influenza A PCR Negative NEG^Negative    Influenza B PCR Negative NEG^Negative    Respiratory Syncytial Virus PCR Negative NEG^Negative    Flu A/B & SARS-CoV-2 PCR Comment       Testing was performed using the Xpert Xpress SARS-CoV2/Flu/RSV Assay on the [employer]   GeneXpert Instrument. Additional information about the Emergency Use Authorization (EUA)   assay can be found via the Lab Guide.     XR Chest Port 1 View    Narrative    PROCEDURE:  XR CHEST PORT 1 VW    HISTORY: cough and fever, PUI. .    COMPARISON:  3/9/2018    FINDINGS:    The cardiomediastinal contours are stable.  No focal consolidation, effusion or pneumothorax.      Impression    IMPRESSION:  Negative portable chest.      GRAY BARROW MD       Medications - No data to display    Assessments & Plan (with Medical Decision Making)     I have reviewed the nursing notes.    I have reviewed the findings, diagnosis, plan and need for follow up with the patient.  Pleasant 35-year-old female presented ambulatory to urgent care for concerns of URI symptoms that started about 3 days ago.  Patient is ill-appearing but nontoxic.  She does have frequent harsh cough.  Respirations are nonlabored with oxygen saturation 97% on room air.  Patient is afebrile during this visit.  Bilateral lung sounds CTA.  Heart rate and rhythm regular.  Differentials include COVID-19, influenza, viral illness, bronchitis, pneumonia.  Patient tested negative for COVID-19, influenza A  and B.  Negative for chest x-ray.  Discussed findings with patient.  Tessalon Perles as prescribed.  Recommended conservative treatment for a viral URI with pushing fluids, Mucinex, tea with honey, Tylenol or ibuprofen as needed and rest.  Follow-up with PCP if no improvement in symptoms.  Return to ED/UC for worsening concerning symptoms.  Patient verbalized  understanding.    This document was prepared using a combination of typing and voice generated software.  While every attempt was made for accuracy, spelling and grammatical errors may exist.    New Prescriptions    BENZONATATE (TESSALON) 100 MG CAPSULE    Take 1 capsule (100 mg) by mouth 3 times daily as needed for cough       Final diagnoses:   Viral upper respiratory tract infection with cough   Generalized body aches   COVID-19 ruled out by laboratory testing       1/12/2021   [facility]     Mpofu, Prudence, CNP  01/12/21 [id number]

## 2021-01-13 ENCOUNTER — MYC MEDICAL ADVICE (OUTPATIENT)
Dept: FAMILY MEDICINE | Facility: OTHER | Age: 36
End: 2021-01-13

## 2021-01-13 LAB
FINAL DIAGNOSIS: NORMAL
HPV HR 12 DNA CVX QL NAA+PROBE: NEGATIVE
HPV16 DNA SPEC QL NAA+PROBE: NEGATIVE
HPV18 DNA SPEC QL NAA+PROBE: NEGATIVE
SPECIMEN DESCRIPTION: NORMAL
SPECIMEN SOURCE CVX/VAG CYTO: NORMAL

## 2021-01-15 ENCOUNTER — INFUSION THERAPY VISIT (OUTPATIENT)
Dept: INFUSION THERAPY | Facility: OTHER | Age: 36
End: 2021-01-15
Attending: PHYSICIAN ASSISTANT
Payer: COMMERCIAL

## 2021-01-15 ENCOUNTER — TELEPHONE (OUTPATIENT)
Dept: FAMILY MEDICINE | Facility: OTHER | Age: 36
End: 2021-01-15

## 2021-01-15 VITALS
HEIGHT: 65 IN | SYSTOLIC BLOOD PRESSURE: 118 MMHG | OXYGEN SATURATION: 100 % | BODY MASS INDEX: 31.63 KG/M2 | RESPIRATION RATE: 16 BRPM | HEART RATE: 85 BPM | WEIGHT: 189.82 LBS | DIASTOLIC BLOOD PRESSURE: 74 MMHG | TEMPERATURE: 99.2 F

## 2021-01-15 DIAGNOSIS — R79.0 DECREASED FERRITIN: Primary | ICD-10-CM

## 2021-01-15 PROCEDURE — 96365 THER/PROPH/DIAG IV INF INIT: CPT | Performed by: PHYSICIAN ASSISTANT

## 2021-01-15 RX ORDER — HEPARIN SODIUM,PORCINE 10 UNIT/ML
5 VIAL (ML) INTRAVENOUS
Status: CANCELLED | OUTPATIENT
Start: 2021-01-17

## 2021-01-15 RX ORDER — HEPARIN SODIUM (PORCINE) LOCK FLUSH IV SOLN 100 UNIT/ML 100 UNIT/ML
5 SOLUTION INTRAVENOUS
Status: CANCELLED | OUTPATIENT
Start: 2021-01-17

## 2021-01-15 RX ADMIN — Medication 250 ML: at 14:13

## 2021-01-15 ASSESSMENT — PAIN SCALES - GENERAL: PAINLEVEL: NO PAIN (0)

## 2021-01-15 ASSESSMENT — MIFFLIN-ST. JEOR: SCORE: 1546.88

## 2021-01-15 NOTE — PATIENT INSTRUCTIONS
Thank you for scheduling your appointment with us today. If you have any questions or concerns related to procedure/medication at today's visit, please contact your primary care provider, or the provider who ordered today's procedure/medication. If you have any questions related to scheduling with our unit, or if you are having trouble getting in contact with your ordering provider, we can be reached at [phone].     Patient Education     Iron Sucrose injection  Brand Name: Venofer  What is this medicine?  IRON SUCROSE (CHANTELLE cuadraohs) is an iron complex. Iron is used to make healthy red blood cells, which carry oxygen and nutrients throughout the body. This medicine is used to treat iron deficiency anemia in people with chronic kidney disease.  How should I use this medicine?  This medicine is for infusion into a vein. It is given by a health care professional in a hospital or clinic setting.  Talk to your pediatrician regarding the use of this medicine in children. While this drug may be prescribed for children as young as 2 years for selected conditions, precautions do apply.  What side effects may I notice from receiving this medicine?  Side effects that you should report to your doctor or health care professional as soon as possible:    allergic reactions like skin rash, itching or hives, swelling of the face, lips, or tongue    breathing problems    changes in blood pressure    cough    fast, irregular heartbeat    feeling faint or lightheaded, falls    fever or chills    flushing, sweating, or hot feelings    joint or muscle aches/pains    seizures    swelling of the ankles or feet    unusually weak or tired  Side effects that usually do not require medical attention (report to your doctor or health care professional if they continue or are bothersome):    diarrhea    feeling achy    headache    irritation at site where injected    nausea, vomiting    stomach upset    tiredness  What may interact with  this medicine?  Do not take this medicine with any of the following medications:    deferoxamine    dimercaprol    other iron products  This medicine may also interact with the following medications:    chloramphenicol    deferasirox  What if I miss a dose?  It is important not to miss your dose. Call your doctor or health care professional if you are unable to keep an appointment.  Where should I keep my medicine?  This drug is given in a hospital or clinic and will not be stored at home.  What should I tell my health care provider before I take this medicine?  They need to know if you have any of these conditions:    anemia not caused by low iron levels    heart disease    high levels of iron in the blood    kidney disease    liver disease    an unusual or allergic reaction to iron, other medicines, foods, dyes, or preservatives    pregnant or trying to get pregnant    breast-feeding  What should I watch for while using this medicine?  Visit your doctor or healthcare professional regularly. Tell your doctor or healthcare professional if your symptoms do not start to get better or if they get worse. You may need blood work done while you are taking this medicine.  You may need to follow a special diet. Talk to your doctor. Foods that contain iron include: whole grains/cereals, dried fruits, beans, or peas, leafy green vegetables, and organ meats (liver, kidney).  NOTE:This sheet is a summary. It may not cover all possible information. If you have questions about this medicine, talk to your doctor, pharmacist, or health care provider. Copyright  2020 [email or web address]

## 2021-01-15 NOTE — PROGRESS NOTES
24 gauge angio cath inserted into Lt arm.  Immediate blood return noted.  IV secured with sterile, transparent dressing and tape.  Patient tolerated well, denies pain or discomfort at this time.  Flushes easily without resistance, no signs or symptoms of infiltration or infection.  Flushed with 3mL normal saline to clear line. Patient denies questions or concerns regarding infusion and/or medication(s) being administered.

## 2021-01-15 NOTE — TELEPHONE ENCOUNTER
Patient called and reported her work would be faxing a form at 1:30. If it can be filled out prior to 3 she will pick it up.

## 2021-01-15 NOTE — PROGRESS NOTES
Patient is a 35 year old female here accompanied by self today for infusion of Venofer per order of Dixie Chun.  Patient identified with two identifiers, order verified, and verbal consent for today's infusion obtained from patient.      IV pump verified with dose, drug, and rate of administration.  Infusion administered per protocol.  Patient tolerated infusion well, no signs or symptoms of adverse reaction noted.  Patient denies pain nor discomfort.     IV removed, catheter intact.  Site clean, dry and intact.  No signs or symptoms of infiltration or infection.  Covered with a sterile bandage, slight pressure applied for 30 seconds.  Pt instructed to leave bandage intact for a minimum of one hour, and to call with questions or concerns.  Copy of appointments, discharge instructions, and after visit summary (AVS) provided to patient.  Patient states understanding, discharged.

## 2021-01-18 ENCOUNTER — INFUSION THERAPY VISIT (OUTPATIENT)
Dept: INFUSION THERAPY | Facility: OTHER | Age: 36
End: 2021-01-18
Attending: PHYSICIAN ASSISTANT
Payer: COMMERCIAL

## 2021-01-18 VITALS
WEIGHT: 184.53 LBS | RESPIRATION RATE: 18 BRPM | HEART RATE: 71 BPM | DIASTOLIC BLOOD PRESSURE: 86 MMHG | BODY MASS INDEX: 30.71 KG/M2 | SYSTOLIC BLOOD PRESSURE: 132 MMHG | TEMPERATURE: 97.4 F | OXYGEN SATURATION: 100 %

## 2021-01-18 DIAGNOSIS — R79.0 DECREASED FERRITIN: Primary | ICD-10-CM

## 2021-01-18 PROCEDURE — 96365 THER/PROPH/DIAG IV INF INIT: CPT | Performed by: PHYSICIAN ASSISTANT

## 2021-01-18 RX ORDER — HEPARIN SODIUM,PORCINE 10 UNIT/ML
5 VIAL (ML) INTRAVENOUS
Status: CANCELLED | OUTPATIENT
Start: 2021-01-19

## 2021-01-18 RX ORDER — HEPARIN SODIUM (PORCINE) LOCK FLUSH IV SOLN 100 UNIT/ML 100 UNIT/ML
5 SOLUTION INTRAVENOUS
Status: CANCELLED | OUTPATIENT
Start: 2021-01-19

## 2021-01-18 RX ADMIN — Medication 250 ML: at 14:28

## 2021-01-18 NOTE — PATIENT INSTRUCTIONS

## 2021-01-18 NOTE — PROGRESS NOTES
24 gauge angio cath inserted into RT hand.  Immediate blood return noted.  IV secured with sterile, transparent dressing and tape.  Patient tolerated well, denies pain or discomfort at this time.  Flushes easily without resistance, no signs or symptoms of infiltration or infection.Flushed with 3mL normal saline to clear line. Patient denies questions or concerns regarding infusion and/or medication(s) being administered.

## 2021-01-18 NOTE — PROGRESS NOTES
Patient is a 35 year old female here accompanied by self today for infusion of venofer per order of PRUDENCE Mccloud.  Patient identified with two identifiers, order verified, and verbal consent for today's infusion obtained from patient.         24 gauge angio cath inserted into right hand by NPALBA.  Immediate blood return noted.  IV secured with sterile, transparent dressing and tape.  Patient tolerated well, denies pain or discomfort at this time.  Flushes easily without resistance, no signs or symptoms of infiltration or infection.   Patient denies questions or concerns regarding infusion and/or medication(s) being administered.      1428 IV pump verified with venofer dose, drug, and rate of administration.  Infusion administered per protocol.  Patient tolerated infusion well, no signs or symptoms of adverse reaction noted.  Patient denies pain nor discomfort.     IV removed, catheter intact.  Site clean, dry and intact.  No signs or symptoms of infiltration or infection.  Covered with a sterile bandage, slight pressure applied for 30 seconds.  Pt instructed to leave bandage intact for a minimum of one hour, and to call with questions or concerns.  Patient declines copy of appointments, discharge instructions, and after visit summary (AVS).  Patient states understanding, discharged.

## 2021-01-20 ENCOUNTER — INFUSION THERAPY VISIT (OUTPATIENT)
Dept: INFUSION THERAPY | Facility: OTHER | Age: 36
End: 2021-01-20
Attending: PHYSICIAN ASSISTANT
Payer: COMMERCIAL

## 2021-01-20 DIAGNOSIS — R79.0 DECREASED FERRITIN: Primary | ICD-10-CM

## 2021-01-20 PROCEDURE — 96365 THER/PROPH/DIAG IV INF INIT: CPT | Performed by: PHYSICIAN ASSISTANT

## 2021-01-20 RX ORDER — HEPARIN SODIUM,PORCINE 10 UNIT/ML
5 VIAL (ML) INTRAVENOUS
Status: CANCELLED | OUTPATIENT
Start: 2021-01-22

## 2021-01-20 RX ORDER — HEPARIN SODIUM (PORCINE) LOCK FLUSH IV SOLN 100 UNIT/ML 100 UNIT/ML
5 SOLUTION INTRAVENOUS
Status: CANCELLED | OUTPATIENT
Start: 2021-01-22

## 2021-01-20 RX ADMIN — Medication 250 ML: at 14:13

## 2021-01-20 NOTE — PROGRESS NOTES
Patient is a 35 year old female here accompanied by self today for infusion of Venofer per order of Dixie Chun   Patient identified with two identifiers, order verified, and verbal consent for today's infusion obtained from patient.      Labs were not required for today's infusion.      Patient meets order parameters for today's treatment.    1414 IV pump verified with dose, drug, and rate of administration.  Infusion administered per protocol.  Patient tolerated infusion well, no signs or symptoms of adverse reaction noted.  Patient denies pain nor discomfort.     IV removed, catheter intact.  Site clean, dry and intact.  No signs or symptoms of infiltration or infection.  Covered with a sterile bandage, slight pressure applied for 30 seconds.  Pt instructed to leave bandage intact for a minimum of one hour, and to call with questions or concerns.  Copy of appointments, discharge instructions, and after visit summary (AVS) provided to patient.  Patient states understanding, discharged.

## 2021-01-20 NOTE — PROGRESS NOTES
24 gauge angio cath inserted into Rt hand.  Immediate blood return noted.  IV secured with sterile, transparent dressing and tape.  Patient tolerated well, denies pain or discomfort at this time.  Flushes easily without resistance, no signs or symptoms of infiltration or infection.  Flushed with 3mL normal saline to clear line. Patient denies questions or concerns regarding infusion and/or medication(s) being administered.

## 2021-01-22 ENCOUNTER — INFUSION THERAPY VISIT (OUTPATIENT)
Dept: INFUSION THERAPY | Facility: OTHER | Age: 36
End: 2021-01-22
Attending: PHYSICIAN ASSISTANT
Payer: COMMERCIAL

## 2021-01-22 VITALS
TEMPERATURE: 98.3 F | WEIGHT: 185.19 LBS | OXYGEN SATURATION: 100 % | RESPIRATION RATE: 18 BRPM | DIASTOLIC BLOOD PRESSURE: 88 MMHG | SYSTOLIC BLOOD PRESSURE: 124 MMHG | BODY MASS INDEX: 30.82 KG/M2 | HEART RATE: 83 BPM

## 2021-01-22 DIAGNOSIS — R79.0 DECREASED FERRITIN: Primary | ICD-10-CM

## 2021-01-22 PROCEDURE — 96365 THER/PROPH/DIAG IV INF INIT: CPT | Performed by: PHYSICIAN ASSISTANT

## 2021-01-22 RX ORDER — HEPARIN SODIUM (PORCINE) LOCK FLUSH IV SOLN 100 UNIT/ML 100 UNIT/ML
5 SOLUTION INTRAVENOUS
Status: CANCELLED | OUTPATIENT
Start: 2021-01-24

## 2021-01-22 RX ORDER — HEPARIN SODIUM,PORCINE 10 UNIT/ML
5 VIAL (ML) INTRAVENOUS
Status: CANCELLED | OUTPATIENT
Start: 2021-01-24

## 2021-01-22 RX ADMIN — Medication 250 ML: at 14:15

## 2021-01-22 NOTE — PROGRESS NOTES
Patient is a 35 year old here today for infusion of Venofer per order of PRUDENCE Mccloud.  Patient identified with two identifiers, order verified, and verbal consent for today's infusion obtained from patient.       Patient meets order parameters for today's treatment.     24 gauge angio cath inserted into right AC.  Immediate blood return noted. IV secured with sterile, transparent dressing and tape. Patient tolerated well, denies pain or discomfort at this time. Flushes easily without resistance, no signs or symptoms of infiltration or infection.   Patient denies questions or concerns regarding infusion and/or medication(s) being administered.    1415 IV pump verified with Venofer dose, drug, and rate of administration. Infusion administered per protocol.  Patient tolerated infusion well, no signs or symptoms of adverse reaction noted. Patient denies pain nor discomfort.     IV removed, catheter intact.  Site clean, dry and intact. No signs or symptoms of infiltration or infection. Covered with a sterile bandage, slight pressure applied for 30 seconds. Pt instructed to leave bandage intact for a minimum of one hour, and to call with questions or concerns. Patient declines copy of appointments, discharge instructions, and after visit summary (AVS). Patient states understanding, discharged.

## 2021-01-22 NOTE — PATIENT INSTRUCTIONS
Thank you for scheduling your appointment with us today. If you have any questions or concerns related to procedure/medication at today's visit, please contact your primary care provider, or the provider who ordered today's procedure/medication. If you have any questions related to scheduling with our unit, or if you are having trouble getting in contact with your ordering provider, we can be reached at [phone].   Patient Education     Iron Sucrose injection  Brand Name: Venofer  What is this medicine?  IRON SUCROSE (CHANTELLE cuadraohs) is an iron complex. Iron is used to make healthy red blood cells, which carry oxygen and nutrients throughout the body. This medicine is used to treat iron deficiency anemia in people with chronic kidney disease.  How should I use this medicine?  This medicine is for infusion into a vein. It is given by a health care professional in a hospital or clinic setting.  Talk to your pediatrician regarding the use of this medicine in children. While this drug may be prescribed for children as young as 2 years for selected conditions, precautions do apply.  What side effects may I notice from receiving this medicine?  Side effects that you should report to your doctor or health care professional as soon as possible:    allergic reactions like skin rash, itching or hives, swelling of the face, lips, or tongue    breathing problems    changes in blood pressure    cough    fast, irregular heartbeat    feeling faint or lightheaded, falls    fever or chills    flushing, sweating, or hot feelings    joint or muscle aches/pains    seizures    swelling of the ankles or feet    unusually weak or tired  Side effects that usually do not require medical attention (report to your doctor or health care professional if they continue or are bothersome):    diarrhea    feeling achy    headache    irritation at site where injected    nausea, vomiting    stomach upset    tiredness  What may interact with  this medicine?  Do not take this medicine with any of the following medications:    deferoxamine    dimercaprol    other iron products  This medicine may also interact with the following medications:    chloramphenicol    deferasirox  What if I miss a dose?  It is important not to miss your dose. Call your doctor or health care professional if you are unable to keep an appointment.  Where should I keep my medicine?  This drug is given in a hospital or clinic and will not be stored at home.  What should I tell my health care provider before I take this medicine?  They need to know if you have any of these conditions:    anemia not caused by low iron levels    heart disease    high levels of iron in the blood    kidney disease    liver disease    an unusual or allergic reaction to iron, other medicines, foods, dyes, or preservatives    pregnant or trying to get pregnant    breast-feeding  What should I watch for while using this medicine?  Visit your doctor or healthcare professional regularly. Tell your doctor or healthcare professional if your symptoms do not start to get better or if they get worse. You may need blood work done while you are taking this medicine.  You may need to follow a special diet. Talk to your doctor. Foods that contain iron include: whole grains/cereals, dried fruits, beans, or peas, leafy green vegetables, and organ meats (liver, kidney).  NOTE:This sheet is a summary. It may not cover all possible information. If you have questions about this medicine, talk to your doctor, pharmacist, or health care provider. Copyright  2020 [employer]

## 2021-01-28 ENCOUNTER — INFUSION THERAPY VISIT (OUTPATIENT)
Dept: INFUSION THERAPY | Facility: OTHER | Age: 36
End: 2021-01-28
Attending: PHYSICIAN ASSISTANT
Payer: COMMERCIAL

## 2021-01-28 VITALS
SYSTOLIC BLOOD PRESSURE: 117 MMHG | RESPIRATION RATE: 18 BRPM | TEMPERATURE: 97.5 F | HEART RATE: 85 BPM | BODY MASS INDEX: 30.08 KG/M2 | OXYGEN SATURATION: 100 % | WEIGHT: 180.78 LBS | DIASTOLIC BLOOD PRESSURE: 73 MMHG

## 2021-01-28 DIAGNOSIS — R79.0 DECREASED FERRITIN: Primary | ICD-10-CM

## 2021-01-28 PROCEDURE — 96365 THER/PROPH/DIAG IV INF INIT: CPT | Performed by: PHYSICIAN ASSISTANT

## 2021-01-28 RX ORDER — HEPARIN SODIUM (PORCINE) LOCK FLUSH IV SOLN 100 UNIT/ML 100 UNIT/ML
5 SOLUTION INTRAVENOUS
Status: CANCELLED | OUTPATIENT
Start: 2021-01-28

## 2021-01-28 RX ORDER — HEPARIN SODIUM,PORCINE 10 UNIT/ML
5 VIAL (ML) INTRAVENOUS
Status: CANCELLED | OUTPATIENT
Start: 2021-01-28

## 2021-01-28 RX ADMIN — Medication 250 ML: at 12:49

## 2021-01-28 NOTE — PATIENT INSTRUCTIONS

## 2021-01-28 NOTE — PROGRESS NOTES
Patient is a 35 year old female here today for infusion of venofer per order of PRUDENCE Mccloud. Patient identified with two identifiers, order verified, and verbal consent for today's infusion obtained from patient.      Patient meets order parameters for today's treatment.     24 gauge angio cath inserted into right AC.  Immediate blood return noted. IV secured with sterile, transparent dressing and tape.  Patient tolerated well, denies pain or discomfort at this time. Flushes easily without resistance, no signs or symptoms of infiltration or infection.   Patient denies questions or concerns regarding infusion and/or medication(s) being administered.    1250 IV pump verified with venofer dose, drug, and rate of administration.  Infusion administered per protocol.  Patient tolerated infusion well, no signs or symptoms of adverse reaction noted.  Patient denies pain nor discomfort.     IV removed, catheter intact.  Site clean, dry and intact.  No signs or symptoms of infiltration or infection.  Covered with a sterile bandage, slight pressure applied for 30 seconds.  Pt instructed to leave bandage intact for a minimum of one hour, and to call with questions or concerns.  Patient declines copy of appointments, discharge instructions, and after visit summary (AVS). Patient states understanding, discharged.

## 2021-02-02 ENCOUNTER — MYC REFILL (OUTPATIENT)
Dept: FAMILY MEDICINE | Facility: OTHER | Age: 36
End: 2021-02-02

## 2021-02-02 DIAGNOSIS — F90.2 ADHD (ATTENTION DEFICIT HYPERACTIVITY DISORDER), COMBINED TYPE: ICD-10-CM

## 2021-02-03 RX ORDER — LISDEXAMFETAMINE DIMESYLATE 60 MG/1
60 CAPSULE ORAL DAILY
Qty: 30 CAPSULE | Refills: 0 | OUTPATIENT
Start: 2021-02-03

## 2021-02-03 RX ORDER — LISDEXAMFETAMINE DIMESYLATE 60 MG/1
60 CAPSULE ORAL DAILY
Qty: 30 CAPSULE | Refills: 0 | Status: SHIPPED | OUTPATIENT
Start: 2021-02-03 | End: 2021-03-01

## 2021-02-03 NOTE — TELEPHONE ENCOUNTER
Yana      Last Written Prescription Date:  1.6.21  Last Fill Quantity: #30,   # refills: 0  Last Office Visit: 1.6.21  Future Office visit:       Routing refill request to provider for review/approval because:  Drug not on the [facility], P or [facility] refill protocol or controlled substance

## 2021-02-03 NOTE — TELEPHONE ENCOUNTER
lisdexamfetamine (VYVANSE) 60 MG capsule      Last Written Prescription Date:  01/06/21  Last Fill Quantity: 30,   # refills: 0  Last Office Visit: 01/06/21  Future Office visit:       Routing refill request to provider for review/approval because:  Drug not on the [facility], [facility] or [facility] refill protocol or controlled substance

## 2021-03-01 ENCOUNTER — MYC REFILL (OUTPATIENT)
Dept: FAMILY MEDICINE | Facility: OTHER | Age: 36
End: 2021-03-01

## 2021-03-01 DIAGNOSIS — F90.2 ADHD (ATTENTION DEFICIT HYPERACTIVITY DISORDER), COMBINED TYPE: ICD-10-CM

## 2021-03-01 RX ORDER — LISDEXAMFETAMINE DIMESYLATE 60 MG/1
60 CAPSULE ORAL DAILY
Qty: 30 CAPSULE | Refills: 0 | Status: SHIPPED | OUTPATIENT
Start: 2021-03-01 | End: 2021-03-30

## 2021-03-01 NOTE — TELEPHONE ENCOUNTER
VYVANXE      Last Written Prescription Date:  2-3-2021  Last Fill Quantity: 30,   # refills: 0  Last Office Visit: 1-6-2021  Future Office visit:       Routing refill request to provider for review/approval because:  Drug not on the [facility], [facility] or [facility] refill protocol or controlled substance

## 2021-03-30 ENCOUNTER — MYC REFILL (OUTPATIENT)
Dept: FAMILY MEDICINE | Facility: OTHER | Age: 36
End: 2021-03-30

## 2021-03-30 DIAGNOSIS — F90.2 ADHD (ATTENTION DEFICIT HYPERACTIVITY DISORDER), COMBINED TYPE: ICD-10-CM

## 2021-03-30 RX ORDER — LISDEXAMFETAMINE DIMESYLATE 60 MG/1
60 CAPSULE ORAL DAILY
Qty: 30 CAPSULE | Refills: 0 | Status: SHIPPED | OUTPATIENT
Start: 2021-03-30 | End: 2021-04-29

## 2021-03-30 NOTE — TELEPHONE ENCOUNTER
Pt of Coleman Millan     Last Written Prescription Date:  3.1.2021  Last Fill Quantity: 30,   # refills: 0  Last Office Visit: 1.6.2021  Future Office visit:       Routing refill request to provider for review/approval because:  Drug not on the [facility], [facility] or [facility] refill protocol or controlled substance      Maxine Abraham RN

## 2021-04-12 ENCOUNTER — IMMUNIZATION (OUTPATIENT)
Dept: FAMILY MEDICINE | Facility: OTHER | Age: 36
End: 2021-04-12
Attending: PHYSICIAN ASSISTANT
Payer: COMMERCIAL

## 2021-04-12 PROCEDURE — 0001A PR COVID VAC PFIZER DIL RECON 30 MCG/0.3 ML IM: CPT

## 2021-04-12 PROCEDURE — 91300 PR COVID VAC PFIZER DIL RECON 30 MCG/0.3 ML IM: CPT

## 2021-04-29 ENCOUNTER — MYC REFILL (OUTPATIENT)
Dept: FAMILY MEDICINE | Facility: OTHER | Age: 36
End: 2021-04-29

## 2021-04-29 DIAGNOSIS — F90.2 ADHD (ATTENTION DEFICIT HYPERACTIVITY DISORDER), COMBINED TYPE: ICD-10-CM

## 2021-04-29 RX ORDER — LISDEXAMFETAMINE DIMESYLATE 60 MG/1
60 CAPSULE ORAL DAILY
Qty: 30 CAPSULE | Refills: 0 | Status: SHIPPED | OUTPATIENT
Start: 2021-04-29 | End: 2021-06-01

## 2021-04-29 NOTE — TELEPHONE ENCOUNTER
Vyvanse 60mg      Last Written Prescription Date:  3/30/21  Last Fill Quantity: 30,   # refills: 0  Last Office Visit: 1/6/21  Future Office visit:       Routing refill request to provider for review/approval because:

## 2021-06-01 ENCOUNTER — MYC REFILL (OUTPATIENT)
Dept: FAMILY MEDICINE | Facility: OTHER | Age: 36
End: 2021-06-01

## 2021-06-01 DIAGNOSIS — F90.2 ADHD (ATTENTION DEFICIT HYPERACTIVITY DISORDER), COMBINED TYPE: ICD-10-CM

## 2021-06-01 RX ORDER — LISDEXAMFETAMINE DIMESYLATE 60 MG/1
60 CAPSULE ORAL DAILY
Qty: 30 CAPSULE | Refills: 0 | Status: SHIPPED | OUTPATIENT
Start: 2021-06-01 | End: 2021-06-28

## 2021-06-01 NOTE — TELEPHONE ENCOUNTER
lisdexamfetamine (VYVANSE) 60 MG capsule      Last Written Prescription Date:  4/29/21  Last Fill Quantity: 30,   # refills: 0  Last Office Visit: 1/6/21  Future Office visit:       Routing refill request to provider for review/approval because:  Drug not on the [facility], [facility] or [facility] refill protocol or controlled substance

## 2021-06-03 ENCOUNTER — IMMUNIZATION (OUTPATIENT)
Dept: FAMILY MEDICINE | Facility: OTHER | Age: 36
End: 2021-06-03
Attending: FAMILY MEDICINE
Payer: COMMERCIAL

## 2021-06-03 PROCEDURE — 0002A PR COVID VAC PFIZER DIL RECON 30 MCG/0.3 ML IM: CPT

## 2021-06-03 PROCEDURE — 91300 PR COVID VAC PFIZER DIL RECON 30 MCG/0.3 ML IM: CPT

## 2021-06-28 ENCOUNTER — MYC REFILL (OUTPATIENT)
Dept: FAMILY MEDICINE | Facility: OTHER | Age: 36
End: 2021-06-28

## 2021-06-28 DIAGNOSIS — F90.2 ADHD (ATTENTION DEFICIT HYPERACTIVITY DISORDER), COMBINED TYPE: ICD-10-CM

## 2021-06-30 RX ORDER — LISDEXAMFETAMINE DIMESYLATE 60 MG/1
60 CAPSULE ORAL DAILY
Qty: 30 CAPSULE | Refills: 0 | Status: SHIPPED | OUTPATIENT
Start: 2021-06-30 | End: 2021-07-26

## 2021-06-30 NOTE — TELEPHONE ENCOUNTER
Vyvanse       Last Written Prescription Date:  6/1/2021  Last Fill Quantity: 30,   # refills: 0  Last Office Visit: 1/6/2021  Future Office visit:         
Alert-The patient is alert, awake and responds to voice. The patient is oriented to time, place, and person. The triage nurse is able to obtain subjective information.
29.5

## 2021-07-26 ENCOUNTER — MYC REFILL (OUTPATIENT)
Dept: FAMILY MEDICINE | Facility: OTHER | Age: 36
End: 2021-07-26

## 2021-07-26 DIAGNOSIS — F90.2 ADHD (ATTENTION DEFICIT HYPERACTIVITY DISORDER), COMBINED TYPE: ICD-10-CM

## 2021-07-27 RX ORDER — LISDEXAMFETAMINE DIMESYLATE 60 MG/1
60 CAPSULE ORAL DAILY
Qty: 30 CAPSULE | Refills: 0 | Status: SHIPPED | OUTPATIENT
Start: 2021-07-27 | End: 2021-08-26

## 2021-07-27 NOTE — TELEPHONE ENCOUNTER
lisdexamfetamine (VYVANSE) 60 MG capsule  Last Written Prescription Date:  6/30/21  Last Fill Quantity: 30,  # refills: 0   Last office visit: 1/6/2021   Future Office Visit:      Routing refill request to provider for review/approval because:  Drug not on the [facility] refill protocol

## 2021-08-26 ENCOUNTER — MYC REFILL (OUTPATIENT)
Dept: FAMILY MEDICINE | Facility: OTHER | Age: 36
End: 2021-08-26

## 2021-08-26 DIAGNOSIS — F90.2 ADHD (ATTENTION DEFICIT HYPERACTIVITY DISORDER), COMBINED TYPE: ICD-10-CM

## 2021-08-30 RX ORDER — LISDEXAMFETAMINE DIMESYLATE 60 MG/1
60 CAPSULE ORAL DAILY
Qty: 30 CAPSULE | Refills: 0 | Status: SHIPPED | OUTPATIENT
Start: 2021-08-30 | End: 2021-09-27

## 2021-08-30 NOTE — TELEPHONE ENCOUNTER
fran      Last Written Prescription Date:  7/27/21  Last Fill Quantity: 30,   # refills: 0  Last Office Visit: 1/6/21  Future Office visit:       Routing refill request to provider for review/approval because:  Drug not on the [facility], [facility] or [facility] refill protocol or controlled substance

## 2021-09-27 ENCOUNTER — MYC REFILL (OUTPATIENT)
Dept: FAMILY MEDICINE | Facility: OTHER | Age: 36
End: 2021-09-27

## 2021-09-27 DIAGNOSIS — F90.2 ADHD (ATTENTION DEFICIT HYPERACTIVITY DISORDER), COMBINED TYPE: ICD-10-CM

## 2021-09-27 NOTE — TELEPHONE ENCOUNTER
lisdexamfetamine (VYVANSE) 60 MG capsule  Last Written Prescription Date:  8/30/2021  Last Fill Quantity: 30,   # refills: 0  Last Office Visit: 1/6/2021  Future Office visit:       Routing refill request to provider for review/approval because:

## 2021-09-29 RX ORDER — LISDEXAMFETAMINE DIMESYLATE 60 MG/1
60 CAPSULE ORAL DAILY
Qty: 30 CAPSULE | Refills: 0 | Status: SHIPPED | OUTPATIENT
Start: 2021-09-29 | End: 2021-10-25

## 2021-10-03 ENCOUNTER — HEALTH MAINTENANCE LETTER (OUTPATIENT)
Age: 36
End: 2021-10-03

## 2021-10-25 ENCOUNTER — MYC REFILL (OUTPATIENT)
Dept: FAMILY MEDICINE | Facility: OTHER | Age: 36
End: 2021-10-25

## 2021-10-25 DIAGNOSIS — F90.2 ADHD (ATTENTION DEFICIT HYPERACTIVITY DISORDER), COMBINED TYPE: ICD-10-CM

## 2021-10-25 NOTE — TELEPHONE ENCOUNTER
Vyvanse      Last Written Prescription Date:  09/29/21  Last Fill Quantity: 30,   # refills: 0  Last Office Visit: 01/06/21  Future Office visit:

## 2021-10-27 RX ORDER — LISDEXAMFETAMINE DIMESYLATE 60 MG/1
60 CAPSULE ORAL DAILY
Qty: 30 CAPSULE | Refills: 0 | Status: SHIPPED | OUTPATIENT
Start: 2021-10-27 | End: 2021-11-22

## 2021-11-22 ENCOUNTER — MYC REFILL (OUTPATIENT)
Dept: FAMILY MEDICINE | Facility: OTHER | Age: 36
End: 2021-11-22
Payer: COMMERCIAL

## 2021-11-22 DIAGNOSIS — F90.2 ADHD (ATTENTION DEFICIT HYPERACTIVITY DISORDER), COMBINED TYPE: ICD-10-CM

## 2021-11-25 NOTE — TELEPHONE ENCOUNTER
DARRELL      Last Written Prescription Date:  10-  Last Fill Quantity: 30,   # refills: 0  Last Office Visit: 1-6-2021  Future Office visit:       Routing refill request to provider for review/approval because:  Drug not on the [facility], [facility] or [facility] refill protocol or controlled substance

## 2021-11-26 RX ORDER — LISDEXAMFETAMINE DIMESYLATE 60 MG/1
60 CAPSULE ORAL DAILY
Qty: 30 CAPSULE | Refills: 0 | Status: SHIPPED | OUTPATIENT
Start: 2021-11-26 | End: 2021-12-23

## 2021-11-28 ENCOUNTER — HEALTH MAINTENANCE LETTER (OUTPATIENT)
Age: 36
End: 2021-11-28

## 2021-12-23 ENCOUNTER — MYC REFILL (OUTPATIENT)
Dept: FAMILY MEDICINE | Facility: OTHER | Age: 36
End: 2021-12-23
Payer: COMMERCIAL

## 2021-12-23 DIAGNOSIS — F90.2 ADHD (ATTENTION DEFICIT HYPERACTIVITY DISORDER), COMBINED TYPE: ICD-10-CM

## 2021-12-23 RX ORDER — LISDEXAMFETAMINE DIMESYLATE 60 MG/1
60 CAPSULE ORAL DAILY
Qty: 30 CAPSULE | Refills: 0 | Status: SHIPPED | OUTPATIENT
Start: 2021-12-23 | End: 2022-01-12

## 2021-12-23 NOTE — TELEPHONE ENCOUNTER
lisdexamfetamine (VYVANSE) 60 MG capsule      Last Written Prescription Date:  11/26/2021  Last Fill Quantity: 30,   # refills: 0  Last Office Visit: 1/6/2021  Future Office visit:    Next 5 appointments (look out 90 days)    Jan 12, 2022  8:45 AM  (Arrive by 8:30 AM)  SHORT with PRUDENCE Braswell  [facility] ([facility] [place] ) [address]  [place] [address]  [phone]           Routing refill request to provider for review/approval because:  Drug not on the [facility], [facility] or  Health refill protocol or controlled substance

## 2022-01-09 NOTE — PROGRESS NOTES
"  Assessment & Plan     Excessive bleeding in premenopausal period  Last few years is out of control.  Soiling constantly. Goes through 60 tampons in a cycle.  Heavy painful and barnhart. Wants to do something about this.   - Ob/Gyn Referral  - TSH with free T4 reflex; Future  - Follicle stimulating hormone; Future  - CBC with platelets and differential; Future  - Basic metabolic panel; Future    Dysmenorrhea  See OB   - Ob/Gyn Referral    Painful coitus, female  Limiting lifestyle.   - Ob/Gyn Referral    ADHD (attention deficit hyperactivity disorder), combined type  Ok and working well,   - lisdexamfetamine (VYVANSE) 60 MG capsule; Take 1 capsule (60 mg) by mouth daily    Vitamin D deficiency  sheis given a refill, only took 4 of them needing to take them weekly. Level was only 11.   - vitamin D2 (ERGOCALCIFEROL) 99763 units (1250 mcg) capsule; Take 1 capsule (50,000 Units) by mouth once a week    Review of external notes as documented elsewhere in note  Ordering of each unique test  Prescription drug management  No LOS data to display   Time spent doing chart review, history and exam, documentation and further activities per the note       BMI:   Estimated body mass index is 29.95 kg/m  as calculated from the following:    Height as of 1/15/21: 1.651 m (5' 5\").    Weight as of this encounter: 81.6 kg (180 lb).       See Patient Instructions    No follow-ups on file.    PRUDENCE Jj  [facility] - ROSANNA Malagon is a 36 year old who presents for the following health issues     HPI     Concern - Discuss hysterectomy   Onset: ongoing   Description: possible hysterectomy discussion  Intensity: mild  Progression of Symptoms:  same  Accompanying Signs & Symptoms: none  Previous history of similar problem: none  Precipitating factors:        Worsened by: none  Alleviating factors:        Improved by: none  Therapies tried and outcome: None, implant , iud, nuvaring, but mood gets so " maryann.       Medication Followup of  Vyvanse    Taking Medication as prescribed: yes    Side Effects:  None    Medication Helping Symptoms:  yes       Review of Systems   CONSTITUTIONAL:NEGATIVE for fever, chills, change in weight  EYES: NEGATIVE for vision changes or irritation  : see hpi  NEURO: NEGATIVE for weakness, dizziness or paresthesias, severe depression with her cycles. And mood is very radical , behavior changes and memory problems  ENDOCRINE: NEGATIVE for temperature intolerance, skin/hair changes and weight stable.   HEME/ALLERGY/IMMUNE: NEGATIVE for bleeding problems  PSYCHIATRIC: severe changes in mood with cycles.       Objective    /72 (BP Location: Left arm, Patient Position: Sitting, Cuff Size: Adult Regular)   Pulse 106   Temp 98.6  F (37  C) (Tympanic)   Wt 81.6 kg (180 lb)   SpO2 99%   BMI 29.95 kg/m    Body mass index is 29.95 kg/m .  Physical Exam     Exam:  Constitutional: healthy, alert and no distress  Head: Normocephalic. No masses, lesions, tenderness or abnormalities  Neck: Neck supple. No adenopathy. Thyroid symmetric, normal size,, Carotids without bruits.  ENT: ENT exam normal, no neck nodes or sinus tenderness  Cardiovascular: negative, PMI normal. No lifts, heaves, or thrills. RRR. No murmurs, clicks gallops or rub  Respiratory: negative, Percussion normal. Good diaphragmatic excursion. Lungs clear  Gastrointestinal: Abdomen soft, non-tender. BS normal. No masses, organomegaly  : Deferred  Musculoskeletal: extremities normal- no gross deformities noted, gait normal and normal muscle tone  Skin: no suspicious lesions or rashes  Neurologic: Gait normal. Reflexes normal and symmetric. Sensation grossly WNL.  Psychiatric: mentation appears normal and affect normal/bright  Hematologic/Lymphatic/Immunologic: Normal cervical lymph nodes    Admission on 01/12/2021, Discharged on 01/12/2021   Component Date Value Ref Range Status     Flu A/B & SARS-COV-2 PCR Source  01/12/2021 Nasopharyngeal   Final     SARS-CoV-2 PCR Result 01/12/2021 NEGATIVE   Final    SARS-CoV2 (COVID-19) RNA not detected, presumed negative.     Influenza A PCR 01/12/2021 Negative  NEG^Negative Final    Influenza A RNA not detected, presumed negative.     Influenza B PCR 01/12/2021 Negative  NEG^Negative Final    Influenza B RNA not detected, presumed negative.     Respiratory Syncytial Virus PCR 01/12/2021 Negative  NEG^Negative Final    Respiratory syncytial virus RNA not detected, presumed negative.     Flu A/B & SARS-CoV-2 PCR Comment 01/12/2021    Final                    Value:Testing was performed using the Xpert Xpress SARS-CoV2/Flu/RSV Assay on the [employer]   GeneXpert Instrument. Additional information about the Emergency Use Authorization (EUA)   assay can be found via the Lab Guide.      Comment: This test should be ordered for the detection of SARS-CoV-2 and influenza   viruses in individuals who meet clinical and/or epidemiological criteria. Test   performance is unknown in asymptomatic patients.  This test is for in vitro diagnostic use under the FDA EUA for laboratories   certified under CLIA to perform high complexity testing. This test has not   been FDA cleared or approved.  A negative result does not rule out the presence of PCR inhibitors in the   specimen or target RNA in concentration below the limit of detection for the   assay.  If only one viral target is positive but coinfection with multiple targets is   suspected, the sample should be re-tested with another FDA cleared, approved   or authorized test, if coinfection would change clinical management.  This test was validated by [facility]. This   laboratory is certified under the Clinical Laboratory Improvement Amendments   (CLIA) as qualified to perform high complexity testing.

## 2022-01-12 ENCOUNTER — OFFICE VISIT (OUTPATIENT)
Dept: FAMILY MEDICINE | Facility: OTHER | Age: 37
End: 2022-01-12
Attending: PHYSICIAN ASSISTANT
Payer: COMMERCIAL

## 2022-01-12 VITALS
OXYGEN SATURATION: 99 % | WEIGHT: 180 LBS | HEART RATE: 106 BPM | BODY MASS INDEX: 29.95 KG/M2 | DIASTOLIC BLOOD PRESSURE: 72 MMHG | TEMPERATURE: 98.6 F | SYSTOLIC BLOOD PRESSURE: 114 MMHG

## 2022-01-12 DIAGNOSIS — N92.4 EXCESSIVE BLEEDING IN PREMENOPAUSAL PERIOD: Primary | ICD-10-CM

## 2022-01-12 DIAGNOSIS — F90.2 ADHD (ATTENTION DEFICIT HYPERACTIVITY DISORDER), COMBINED TYPE: ICD-10-CM

## 2022-01-12 DIAGNOSIS — N94.6 DYSMENORRHEA: ICD-10-CM

## 2022-01-12 DIAGNOSIS — N94.10 PAINFUL COITUS, FEMALE: ICD-10-CM

## 2022-01-12 DIAGNOSIS — E55.9 VITAMIN D DEFICIENCY: ICD-10-CM

## 2022-01-12 LAB
ANION GAP SERPL CALCULATED.3IONS-SCNC: 2 MMOL/L (ref 3–14)
BASOPHILS # BLD AUTO: 0.1 10E3/UL (ref 0–0.2)
BASOPHILS NFR BLD AUTO: 1 %
BUN SERPL-MCNC: 7 MG/DL (ref 7–30)
CALCIUM SERPL-MCNC: 9.1 MG/DL (ref 8.5–10.1)
CHLORIDE BLD-SCNC: 104 MMOL/L (ref 94–109)
CO2 SERPL-SCNC: 30 MMOL/L (ref 20–32)
CREAT SERPL-MCNC: 0.65 MG/DL (ref 0.52–1.04)
EOSINOPHIL # BLD AUTO: 0.4 10E3/UL (ref 0–0.7)
EOSINOPHIL NFR BLD AUTO: 4 %
ERYTHROCYTE [DISTWIDTH] IN BLOOD BY AUTOMATED COUNT: 12.3 % (ref 10–15)
FSH SERPL-ACNC: 6.7 IU/L
GFR SERPL CREATININE-BSD FRML MDRD: >90 ML/MIN/1.73M2
GLUCOSE BLD-MCNC: 89 MG/DL (ref 70–99)
HCT VFR BLD AUTO: 45.7 % (ref 35–47)
HGB BLD-MCNC: 15.4 G/DL (ref 11.7–15.7)
IMM GRANULOCYTES # BLD: 0.1 10E3/UL
IMM GRANULOCYTES NFR BLD: 1 %
LYMPHOCYTES # BLD AUTO: 1.9 10E3/UL (ref 0.8–5.3)
LYMPHOCYTES NFR BLD AUTO: 19 %
MCH RBC QN AUTO: 30.1 PG (ref 26.5–33)
MCHC RBC AUTO-ENTMCNC: 33.7 G/DL (ref 31.5–36.5)
MCV RBC AUTO: 89 FL (ref 78–100)
MONOCYTES # BLD AUTO: 0.6 10E3/UL (ref 0–1.3)
MONOCYTES NFR BLD AUTO: 6 %
NEUTROPHILS # BLD AUTO: 7 10E3/UL (ref 1.6–8.3)
NEUTROPHILS NFR BLD AUTO: 69 %
NRBC # BLD AUTO: 0 10E3/UL
NRBC BLD AUTO-RTO: 0 /100
PLATELET # BLD AUTO: 442 10E3/UL (ref 150–450)
POTASSIUM BLD-SCNC: 4.1 MMOL/L (ref 3.4–5.3)
RBC # BLD AUTO: 5.11 10E6/UL (ref 3.8–5.2)
SODIUM SERPL-SCNC: 136 MMOL/L (ref 133–144)
TSH SERPL DL<=0.005 MIU/L-ACNC: 1.86 MU/L (ref 0.4–4)
WBC # BLD AUTO: 10 10E3/UL (ref 4–11)

## 2022-01-12 PROCEDURE — 84443 ASSAY THYROID STIM HORMONE: CPT | Performed by: PHYSICIAN ASSISTANT

## 2022-01-12 PROCEDURE — 36415 COLL VENOUS BLD VENIPUNCTURE: CPT | Performed by: PHYSICIAN ASSISTANT

## 2022-01-12 PROCEDURE — 83001 ASSAY OF GONADOTROPIN (FSH): CPT | Performed by: PHYSICIAN ASSISTANT

## 2022-01-12 PROCEDURE — 85025 COMPLETE CBC W/AUTO DIFF WBC: CPT | Performed by: PHYSICIAN ASSISTANT

## 2022-01-12 PROCEDURE — 80048 BASIC METABOLIC PNL TOTAL CA: CPT | Performed by: PHYSICIAN ASSISTANT

## 2022-01-12 PROCEDURE — 99214 OFFICE O/P EST MOD 30 MIN: CPT | Performed by: PHYSICIAN ASSISTANT

## 2022-01-12 RX ORDER — ERGOCALCIFEROL 1.25 MG/1
50000 CAPSULE, LIQUID FILLED ORAL WEEKLY
Qty: 12 CAPSULE | Refills: 3 | Status: SHIPPED | OUTPATIENT
Start: 2022-01-12 | End: 2022-12-16 | Stop reason: ALTCHOICE

## 2022-01-12 RX ORDER — LISDEXAMFETAMINE DIMESYLATE 60 MG/1
60 CAPSULE ORAL DAILY
Qty: 30 CAPSULE | Refills: 0 | Status: SHIPPED | OUTPATIENT
Start: 2022-01-12 | End: 2022-02-15

## 2022-01-12 ASSESSMENT — ANXIETY QUESTIONNAIRES
6. BECOMING EASILY ANNOYED OR IRRITABLE: NOT AT ALL
2. NOT BEING ABLE TO STOP OR CONTROL WORRYING: NOT AT ALL
1. FEELING NERVOUS, ANXIOUS, OR ON EDGE: NOT AT ALL
5. BEING SO RESTLESS THAT IT IS HARD TO SIT STILL: NOT AT ALL
GAD7 TOTAL SCORE: 0
3. WORRYING TOO MUCH ABOUT DIFFERENT THINGS: NOT AT ALL
4. TROUBLE RELAXING: NOT AT ALL
7. FEELING AFRAID AS IF SOMETHING AWFUL MIGHT HAPPEN: NOT AT ALL

## 2022-01-12 ASSESSMENT — PAIN SCALES - GENERAL: PAINLEVEL: NO PAIN (0)

## 2022-01-12 ASSESSMENT — PATIENT HEALTH QUESTIONNAIRE - PHQ9: SUM OF ALL RESPONSES TO PHQ QUESTIONS 1-9: 0

## 2022-01-12 NOTE — PATIENT INSTRUCTIONS
Thank you for choosing [facility].   I have office hours 8:00 am to 4:30 pm on Monday's, Wednesday's, Thursday's and Friday's. My nurse and I are out of the office every Tuesday.    Following your visit, when your labs and diagnostic testing have returned, I will review then and you will be contacted by my nurse.  If you are on My Chart, you can also view results there.    For refills, notify your pharmacy regarding what you need and the pharmacy will generate a refill request. Do not call my nurse as she is unable to process refill request. Please plan ahead and allow 3-5 days for refill requests.    You will generally receive a reminder call the day prior to your appointment.  If you cannot attend your appointment, please cancel your appointment with as much notice as possible.  If there is a pattern of failure to present for your appointments, I cannot provide consistent, meaningful, ongoing care for you. It is very important to me that you come in for your care, so we can best assist you with your health care needs.    IMPORTANT:  Please note that it is my standard of practice to NOT participate in prescribing ongoing requested Narcotic Analgesic therapy, and/or participate in the prescribing of other controlled substances.  My nurse and I am happy to assist you with the process of referral for alternative pain management as needed, and other treatment modalities including but not limited to:  Physical Therapy, Physical Medicine and Rehab, Counseling, Chiropractic Care, Orthopedic Care, and non-narcotic medication management.     In the event that you need to be seen for emergent concerns and I am out of office,  please see one of my colleagues for acute concerns.  You may also present to  or ER.  I appreciate the opportunity to serve you and look forward to supporting your healthcare needs in the future. Please contact me with any questions or concerns that you may  have.    Sincerely,      Dixie Chun RN, PA-C

## 2022-01-12 NOTE — NURSING NOTE
"Chief Complaint   Patient presents with     discuss hyserectomy     RECHECK       Initial /72 (BP Location: Left arm, Patient Position: Sitting, Cuff Size: Adult Regular)   Pulse 106   Temp 98.6  F (37  C) (Tympanic)   Wt 81.6 kg (180 lb)   SpO2 99%   BMI 29.95 kg/m   Estimated body mass index is 29.95 kg/m  as calculated from the following:    Height as of 1/15/21: 1.651 m (5' 5\").    Weight as of this encounter: 81.6 kg (180 lb).  Medication Reconciliation: complete  Vesna Kahn LPN  "

## 2022-01-13 ASSESSMENT — ANXIETY QUESTIONNAIRES: GAD7 TOTAL SCORE: 0

## 2022-01-18 ENCOUNTER — TELEPHONE (OUTPATIENT)
Dept: FAMILY MEDICINE | Facility: OTHER | Age: 37
End: 2022-01-18
Payer: COMMERCIAL

## 2022-01-18 NOTE — TELEPHONE ENCOUNTER
I called and left message for patient to call back, she had questions regarding her lab results.

## 2022-01-28 ENCOUNTER — OFFICE VISIT (OUTPATIENT)
Dept: OBGYN | Facility: OTHER | Age: 37
End: 2022-01-28
Attending: OBSTETRICS & GYNECOLOGY
Payer: COMMERCIAL

## 2022-01-28 VITALS
HEART RATE: 96 BPM | OXYGEN SATURATION: 100 % | HEIGHT: 65 IN | WEIGHT: 175 LBS | BODY MASS INDEX: 29.16 KG/M2 | SYSTOLIC BLOOD PRESSURE: 118 MMHG | DIASTOLIC BLOOD PRESSURE: 78 MMHG

## 2022-01-28 DIAGNOSIS — N92.0 MENORRHAGIA WITH REGULAR CYCLE: ICD-10-CM

## 2022-01-28 DIAGNOSIS — N94.6 DYSMENORRHEA: ICD-10-CM

## 2022-01-28 DIAGNOSIS — N92.4 EXCESSIVE BLEEDING IN PREMENOPAUSAL PERIOD: ICD-10-CM

## 2022-01-28 DIAGNOSIS — R10.2 PELVIC PAIN IN FEMALE: Primary | ICD-10-CM

## 2022-01-28 DIAGNOSIS — N94.10 PAINFUL COITUS, FEMALE: ICD-10-CM

## 2022-01-28 PROCEDURE — 99204 OFFICE O/P NEW MOD 45 MIN: CPT | Mod: 57 | Performed by: OBSTETRICS & GYNECOLOGY

## 2022-01-28 ASSESSMENT — MIFFLIN-ST. JEOR: SCORE: 1474.67

## 2022-01-28 ASSESSMENT — PAIN SCALES - GENERAL: PAINLEVEL: MODERATE PAIN (5)

## 2022-01-28 NOTE — PROGRESS NOTES
CC:  Consult from [facility]  for menorrhagia/dysmenorrhea/dyspareunia.   HPI:  Vesna Fabian is a 36 year old female P1 (vag).  Patient's last menstrual period was 12/31/2021 (approximate)..  Menses are Regular lasting 5 days with 5 of heavy flow.  She has had dysmenorrhea since menarche.  However getting worse x 8 months with pain 1 week prior and several days after menses and dyspareunia.  Dyspareunia is deep and also on entry due to labia that have enlarged and get in the way during entry.  Her menstrual flow is limiting her clothing choices and interferes with lifestyle/activites and causing her to miss work. .   She has previous tried OCP's, Nuvaring, IUD without success.   Recent nml TSH, Pap, CBC, FSH      Clots: Yes  Intermenstrual bleeding: No  Post-coital bleeding: No  Previous work-up:No  Contraception: Vas  Abnormal Pap: Yes,        Past GYN history:        Last PAP smear:  Normal    Patients records are available and reviewed at today's visit.    Past Medical History:   Diagnosis Date     Attention deficit disorder without mention of hyperactivity 5/31/2000     Bipolar affective disorder 8/9/2012     Bulimia 8/9/2012     Self-injurious behavior 8/9/2012     Suicide attempt 8/9/2012       Past Surgical History:   Procedure Laterality Date     cyst ovary      LT     MAMMOPLASTY REDUCTION  2011     REPAIR TENDON PATELLA Left 7/10/2020    Procedure: Left knee patellorfemoral ligament repair;  Surgeon: Melchor ePrkins DO;  Location: [facility]     wisdom teeth removed         Family History   Problem Relation Age of Onset     Cancer Maternal Grandmother         pancreatic cancer; cause of death     Cancer Father         rectal     Colon Cancer Father      Heart Disease Paternal Grandmother         heart disease     Hypertension Mother        Current Outpatient Medications   Medication Sig Dispense Refill     lisdexamfetamine (VYVANSE) 60 MG capsule Take 1 capsule (60 mg) by mouth daily 30 capsule 0      "vitamin D2 (ERGOCALCIFEROL) 97186 units (1250 mcg) capsule Take 1 capsule (50,000 Units) by mouth once a week 12 capsule 3     benzonatate (TESSALON) 100 MG capsule Take 1 capsule (100 mg) by mouth 3 times daily as needed for cough (Patient not taking: Reported on 1/28/2021) 21 capsule 0       Allergies: Patient has no known allergies.    ROS:  CONSTITUTIONAL: NEGATIVE for fever, chills, change in weight  GI: NEGATIVE for nausea, abdominal pain, heartburn, or change in bowel habits  : NEGATIVE for frequency, dysuria, hematuria, vaginal discharge  PSYCHIATRIC: NEGATIVE for changes in mood or affect    EXAM:  Blood pressure 118/78, pulse 96, height 1.651 m (5' 5\"), weight 79.4 kg (175 lb), last menstrual period 12/31/2021, SpO2 100 %.   BMI= Body mass index is 29.12 kg/m .  General - pleasant female in no acute distress.  Abdomen - soft, nontender, nondistended, no hepatosplenomegaly.  Pelvic - EG: normal adult female with enlarged labia bilaterally. , BUS: within normal limits, Vagina: well rugated, no discharge, Cervix: no lesions or CMT, Uterus: firm,  normal sized and tender, Adnexae: no masses or tenderness.  Rectovaginal - deferred.  Musculoskeletal - no gross deformities or edema  Neurological - normal mental status.      ASSESSMENT/PLAN:    (N94.6) Dysmenorrhea    (N94.10) Painful coitus, female    (N92.0) Menorrhagia with regular cycle    Labial enlargement/hypertropy      Plan: US Pelvic Transabdominal and Transvaginal         Discussed expectant,  medical, IUD and and surgical options(endometrial ablation/hysterectomy).   Not a good candidate for ablation given pain/dysmenorrhea.  Patient done with childbearing and  would like to proceed with hysterectomy.  Recommend LAVH, bilateral salpingectomy, indicated procedures.  Reviewed goals, risks, alternatives for planned procedure.  Including risk of bleeding, infection, damage to nerves, blood vessels, bowel and bladder. Discussed recovery period and " expected discomfort.. All questions were answered.  We will plan to call to schedule when Covid surgical restrictions lifted.   Pt has my card and phone number to call as needed if problems in the interim or she does not hear her results. Would defer labioplasty secondary to insurance coverage concerns.

## 2022-01-28 NOTE — NURSING NOTE
"Chief Complaint   Patient presents with     Consult     Consult from REUBEN Chun for dysmenorrhea       Initial /78 (BP Location: Left arm, Patient Position: Sitting, Cuff Size: Adult Regular)   Pulse 96   Ht 1.651 m (5' 5\")   Wt 79.4 kg (175 lb)   LMP 12/31/2021 (Approximate)   SpO2 100%   BMI 29.12 kg/m   Estimated body mass index is 29.12 kg/m  as calculated from the following:    Height as of this encounter: 1.651 m (5' 5\").    Weight as of this encounter: 79.4 kg (175 lb).  Medication Reconciliation: complete  VALENTINA KISER LPN  "

## 2022-02-13 ENCOUNTER — HOSPITAL ENCOUNTER (EMERGENCY)
Facility: HOSPITAL | Age: 37
Discharge: HOME | End: 2022-02-13
Attending: NURSE PRACTITIONER | Admitting: NURSE PRACTITIONER
Payer: COMMERCIAL

## 2022-02-13 VITALS
SYSTOLIC BLOOD PRESSURE: 144 MMHG | OXYGEN SATURATION: 99 % | TEMPERATURE: 99 F | HEART RATE: 85 BPM | DIASTOLIC BLOOD PRESSURE: 109 MMHG | RESPIRATION RATE: 16 BRPM

## 2022-02-13 DIAGNOSIS — L42 PITYRIASIS ROSEA: ICD-10-CM

## 2022-02-13 PROCEDURE — G0463 HOSPITAL OUTPT CLINIC VISIT: HCPCS | Mod: 25

## 2022-02-13 PROCEDURE — 96372 THER/PROPH/DIAG INJ SC/IM: CPT | Performed by: NURSE PRACTITIONER

## 2022-02-13 PROCEDURE — 99213 OFFICE O/P EST LOW 20 MIN: CPT | Performed by: NURSE PRACTITIONER

## 2022-02-13 PROCEDURE — 250N000011 HC RX IP 250 OP 636: Performed by: NURSE PRACTITIONER

## 2022-02-13 RX ORDER — DEXAMETHASONE SODIUM PHOSPHATE 10 MG/ML
10 INJECTION, SOLUTION INTRAMUSCULAR; INTRAVENOUS ONCE
Status: COMPLETED | OUTPATIENT
Start: 2022-02-13 | End: 2022-02-13

## 2022-02-13 RX ORDER — PREDNISONE 20 MG/1
20 TABLET ORAL DAILY
Qty: 5 TABLET | Refills: 0 | Status: SHIPPED | OUTPATIENT
Start: 2022-02-13 | End: 2022-03-25

## 2022-02-13 RX ADMIN — DEXAMETHASONE SODIUM PHOSPHATE 10 MG: 10 INJECTION, SOLUTION INTRAMUSCULAR; INTRAVENOUS at 15:43

## 2022-02-13 ASSESSMENT — ENCOUNTER SYMPTOMS
SHORTNESS OF BREATH: 0
SORE THROAT: 0
MUSCULOSKELETAL NEGATIVE: 1
VOMITING: 0
COUGH: 0
DIARRHEA: 1
FEVER: 0
PSYCHIATRIC NEGATIVE: 1
NAUSEA: 0
EYES NEGATIVE: 1
HEADACHES: 0
DIZZINESS: 0

## 2022-02-13 NOTE — ED PROVIDER NOTES
History     Chief Complaint   Patient presents with     Rash     HPI  Vesna Fabian is a 36 year old female who started with rash on Thursday. Started on abdomen and back and has spread-to top head, forehead, along hairline. It is itchy. Temp 99 TMAX, States sometimes feels like hard to swallow. No SOB, had diarrhea yesterday.  Has been taking Benadryl.  No new foods, soaps, creams, stressors, animals.  .. Has patch  On left abdomen-started with that patch.      Allergies:  No Known Allergies    Problem List:    Patient Active Problem List    Diagnosis Date Noted     Decreased ferritin 01/08/2021     Priority: Medium     Ligament tear 06/24/2020     Priority: Medium     Added automatically from request for surgery [id number]       ACP (advance care planning) 03/10/2016     Priority: Medium     Advance Care Planning 3/10/2016: ACP Review of Chart / Resources Provided:  Reviewed chart for advance care plan.  Vesna Fabian has been provided information and resources to begin or update their advance care plan.  Added by Kenisha Chilel             NO SHOW 06/18/2015     Priority: Medium     No shows for Dr. Valdez : 5/15/13; 4/29/15         ASCUS (atypical squamous cells of undetermined significance) on Pap smear 04/29/2014     Priority: Medium     Encounter for routine gynecological examination 04/21/2014     Priority: Medium     Problem list name updated by automated process. Provider to review       Decreased libido 04/21/2014     Priority: Medium     Sensate focus       Fatigue 04/21/2014     Priority: Medium     Smoker 04/21/2014     Priority: Medium     Ready to quit. Only 3 cigs in a week       Family planning 04/22/2013     Priority: Medium     Moderate major depression (H) 04/22/2013     Priority: Medium     Denies suicidal or homicidal ideations at this time. Working with RAÚL Molina       Anxiety 04/22/2013     Priority: Medium     Chronic fatigue 04/22/2013     Priority: Medium     Bipolar disorder,  unspecified (H) 2012     Priority: Medium        Past Medical History:    Past Medical History:   Diagnosis Date     Attention deficit disorder without mention of hyperactivity 2000     Bipolar affective disorder 2012     Bulimia 2012     Self-injurious behavior 2012     Suicide attempt 2012       Past Surgical History:    Past Surgical History:   Procedure Laterality Date     cyst ovary      LT     MAMMOPLASTY REDUCTION  2011     REPAIR TENDON PATELLA Left 7/10/2020    Procedure: Left knee patellorfemoral ligament repair;  Surgeon: Melchor Perkins DO;  Location: [facility]     wisdom teeth removed         Family History:    Family History   Problem Relation Age of Onset     Cancer Maternal Grandmother         pancreatic cancer; cause of death     Cancer Father         rectal     Colon Cancer Father      Heart Disease Paternal Grandmother         heart disease     Hypertension Mother        Social History:  Marital Status:  Single [1]  Social History     Tobacco Use     Smoking status: Former Smoker     Years: 10.00     Types: Cigarettes     Quit date: 2020     Years since quittin.0     Smokeless tobacco: Never Used     Tobacco comment: quit . longest tobacco free 1 year. no passive exposure   Substance Use Topics     Alcohol use: Yes     Comment: rarely     Drug use: No        Medications:    lisdexamfetamine (VYVANSE) 60 MG capsule  predniSONE (DELTASONE) 20 MG tablet  vitamin D2 (ERGOCALCIFEROL) 71536 units (1250 mcg) capsule          Review of Systems   Constitutional: Negative for fever.   HENT: Negative for congestion, ear pain and sore throat.    Eyes: Negative.    Respiratory: Negative for cough and shortness of breath.    Gastrointestinal: Positive for diarrhea. Negative for nausea and vomiting.   Musculoskeletal: Negative.    Skin: Positive for rash.        Itchy red  Rash on back, abdomen, chest,   scalp, neck , small on thighs.      Neurological: Negative for dizziness  and headaches.   Psychiatric/Behavioral: Negative.        Physical Exam   BP: (!) 144/109  Pulse: 85  Temp: 99  F (37.2  C)  Resp: 16  SpO2: 99 %      Physical Exam  Constitutional:       Comments: Distress from itching.     HENT:      Right Ear: Tympanic membrane, ear canal and external ear normal.      Left Ear: Tympanic membrane, ear canal and external ear normal.      Nose: Nose normal. No congestion or rhinorrhea.      Mouth/Throat:      Mouth: Mucous membranes are moist.      Pharynx: Oropharynx is clear. No posterior oropharyngeal erythema.   Eyes:      Extraocular Movements: Extraocular movements intact.      Conjunctiva/sclera: Conjunctivae normal.      Pupils: Pupils are equal, round, and reactive to light.   Cardiovascular:      Rate and Rhythm: Normal rate and regular rhythm.      Heart sounds: No murmur heard.      Pulmonary:      Effort: Pulmonary effort is normal.      Breath sounds: Normal breath sounds.   Chest:       Musculoskeletal:         General: Normal range of motion.      Cervical back: Normal range of motion and neck supple.   Lymphadenopathy:      Cervical: No cervical adenopathy.   Skin:     General: Skin is warm and dry.      Findings: Rash present.      Comments: [place] patch under Left breast.     Neurological:      General: No focal deficit present.      Mental Status: She is alert and oriented to person, place, and time.      Cranial Nerves: No cranial nerve deficit.   Psychiatric:         Mood and Affect: Mood normal.         Behavior: Behavior normal.         ED Course            No results found for this or any previous visit (from the past 24 hour(s)).    Medications   dexamethasone PF (DECADRON) injection 10 mg (10 mg Intramuscular Given 2/13/22 3153)       Assessments & Plan (with Medical Decision Making)     I have reviewed the nursing notes.    I have reviewed the findings, diagnosis, plan and need for follow up with the patient.      New Prescriptions    PREDNISONE  (DELTASONE) 20 MG TABLET    Take 1 tablet (20 mg) by mouth daily       Final diagnoses:   Pityriasis rosea     ASSESSMENT / PLAN:  (L42) Pityriasis rosea  Comment:  Plan:   1. Got Dexamethasone 10mg in Urgent care  2. Prednisone 20mg a day for 5 days-start tomorrow.   3. Drink lots of fluids  4. Caladryl lotion to rash for comfort  5. Antihistamine like Claritin, zyrtec daily for 5 days at least  6. May take 1-2 months, sometimes longer to resolve. Is not catchy.      2/13/2022   [facility]     Agustin Jalloh NP  02/13/22 4208

## 2022-02-13 NOTE — ED TRIAGE NOTES
Diffuse rash on truck, upper arms, upper legs, scalp, back, buttocks. Started last Sunday progressively getting worse through the week. Pt denies SOB. Resp. Easy and non-labored.

## 2022-02-13 NOTE — DISCHARGE INSTRUCTIONS
Got Dexamethasone 10mg in Urgent care  Prednisone 20mg a day for 5 days-start tomorrow.   Drink lots of fluids  Caladryl lotion to rash for comfort  Antihistamine like Claritin, zyrtec daily for 5 days at least  May take 1-2 months, sometimes longer to resolve. Is not catchy.

## 2022-02-15 ENCOUNTER — MYC REFILL (OUTPATIENT)
Dept: FAMILY MEDICINE | Facility: OTHER | Age: 37
End: 2022-02-15
Payer: COMMERCIAL

## 2022-02-15 DIAGNOSIS — F90.2 ADHD (ATTENTION DEFICIT HYPERACTIVITY DISORDER), COMBINED TYPE: ICD-10-CM

## 2022-02-16 RX ORDER — LISDEXAMFETAMINE DIMESYLATE 60 MG/1
60 CAPSULE ORAL DAILY
Qty: 30 CAPSULE | Refills: 0 | Status: SHIPPED | OUTPATIENT
Start: 2022-02-16 | End: 2022-03-14

## 2022-02-16 NOTE — TELEPHONE ENCOUNTER
Yana      Last Written Prescription Date:  1.12.22  Last Fill Quantity: #30,   # refills: 0  Last Office Visit: 1.12.22  Future Office visit:       Routing refill request to provider for review/approval because:  Drug not on the [facility], [facility] or [facility] refill protocol or controlled substance

## 2022-02-28 ENCOUNTER — HOSPITAL ENCOUNTER (OUTPATIENT)
Dept: ULTRASOUND IMAGING | Facility: HOSPITAL | Age: 37
Discharge: HOME | End: 2022-02-28
Attending: OBSTETRICS & GYNECOLOGY | Admitting: OBSTETRICS & GYNECOLOGY
Payer: COMMERCIAL

## 2022-02-28 ENCOUNTER — PREP FOR PROCEDURE (OUTPATIENT)
Dept: OBGYN | Facility: OTHER | Age: 37
End: 2022-02-28
Payer: COMMERCIAL

## 2022-02-28 DIAGNOSIS — Z01.812 ENCOUNTER FOR PREOPERATIVE SCREENING LABORATORY TESTING FOR COVID-19 VIRUS: ICD-10-CM

## 2022-02-28 DIAGNOSIS — N94.10 DYSPAREUNIA, FEMALE: ICD-10-CM

## 2022-02-28 DIAGNOSIS — N94.6 DYSMENORRHEA: ICD-10-CM

## 2022-02-28 DIAGNOSIS — R10.2 PELVIC PAIN IN FEMALE: ICD-10-CM

## 2022-02-28 DIAGNOSIS — Z11.52 ENCOUNTER FOR PREOPERATIVE SCREENING LABORATORY TESTING FOR COVID-19 VIRUS: ICD-10-CM

## 2022-02-28 DIAGNOSIS — N92.0 MENORRHAGIA WITH REGULAR CYCLE: ICD-10-CM

## 2022-02-28 DIAGNOSIS — N92.0 MENORRHAGIA: Primary | ICD-10-CM

## 2022-02-28 PROCEDURE — 76856 US EXAM PELVIC COMPLETE: CPT

## 2022-03-14 ENCOUNTER — MYC REFILL (OUTPATIENT)
Dept: FAMILY MEDICINE | Facility: OTHER | Age: 37
End: 2022-03-14
Payer: COMMERCIAL

## 2022-03-14 DIAGNOSIS — F90.2 ADHD (ATTENTION DEFICIT HYPERACTIVITY DISORDER), COMBINED TYPE: ICD-10-CM

## 2022-03-15 RX ORDER — LISDEXAMFETAMINE DIMESYLATE 60 MG/1
60 CAPSULE ORAL DAILY
Qty: 30 CAPSULE | Refills: 0 | Status: SHIPPED | OUTPATIENT
Start: 2022-03-15 | End: 2022-04-19

## 2022-03-15 NOTE — TELEPHONE ENCOUNTER
Yana      Last Written Prescription Date:  2/16/22  Last Fill Quantity: 30,   # refills: 0  Last Office Visit: 1/28/22  Future Office visit:    Next 5 appointments (look out 90 days)    Mar 25, 2022 11:15 AM  (Arrive by 11:00 AM)  Pre-Op physical with PRUDENCE Braswell  [facility] [place] ([facility] [place] ) [address]  [place] [address]  [phone]   Apr 02, 2022  8:00 AM  (Arrive by 7:45 AM)  SHORT with HC COLLECTION  [facility] [place] ([facility] [place] ) [address]  [place] [address]  [phone]

## 2022-03-19 ENCOUNTER — HEALTH MAINTENANCE LETTER (OUTPATIENT)
Age: 37
End: 2022-03-19

## 2022-03-23 NOTE — PROGRESS NOTES
[facility] [place]  [address]  [facility] [address]  Phone: [phone]  Primary Provider: Dixie Husain  Pre-op Performing Provider: DIXIE HUSAIN      PREOPERATIVE EVALUATION:  Today's date: 3/25/2022    Vesna Fabian is a 36 year old female who presents for a preoperative evaluation.    Surgical Information:  Surgery/Procedure: Hysterectomy   Surgery Location: [facility]  Surgeon: Dr. Cain  Surgery Date: 4/6/2022  Time of Surgery: tbd  Where patient plans to recover: Other: 1 night in hospital then gets to go home  Fax number for surgical facility: Note does not need to be faxed, will be available electronically in Epic.    Type of Anesthesia Anticipated: to be determined    Assessment & Plan     The proposed surgical procedure is considered INTERMEDIATE risk.    Preop general physical exam  Having a hysterectomy laparoscopic assisted.  Has some endometriosis they will also take care of .  She will have her ovaries remaining.    - Basic metabolic panel; Future  - CBC with platelets and differential; Future  - HCG Qual, Urine ([id number]); Future         Risks and Recommendations:  The patient has the following additional risks and recommendations for perioperative complications:      Medication Instructions:  Patient is to take all scheduled medications on the day of surgery EXCEPT for modifications listed below:    RECOMMENDATION:  APPROVAL GIVEN to proceed with proposed procedure pending review of diagnostic evaluation.  5 minutes in chart review.     5 minutes spent on the date of the encounter doing chart review, patient visit and documentation         Subjective     HPI related to upcoming procedure: she has severe menorrhagia and recurrent issues with heavy bleeding and endometriosis.  Will be having a Hysterectomy via laparoscope open is possible,  Has had major surgery in the past and tolerate anesthesia.        Preop Questions 3/25/2022   1. Have you ever had a heart attack or stroke?  No   2. Have you ever had surgery on your heart or blood vessels, such as a stent placement, a coronary artery bypass, or surgery on an artery in your head, neck, heart, or legs? No   3. Do you have chest pain with activity? No   4. Do you have a history of  heart failure? No   5. Do you currently have a cold, bronchitis or symptoms of other infection? No   6. Do you have a cough, shortness of breath, or wheezing? No   7. Do you or anyone in your family have previous history of blood clots? No   8. Do you or does anyone in your family have a serious bleeding problem such as prolonged bleeding following surgeries or cuts? No   9. Have you ever had problems with anemia or been told to take iron pills? YES - had low ferritin on 1/21. Ended up with iron infusions.    10. Have you had any abnormal blood loss such as black, tarry or bloody stools, or abnormal vaginal bleeding? No   11. Have you ever had a blood transfusion? No   12. Are you willing to have a blood transfusion if it is medically needed before, during, or after your surgery? Yes   13. Have you or any of your relatives ever had problems with anesthesia? No   14. Do you have sleep apnea, excessive snoring or daytime drowsiness? No   15. Do you have any artifical heart valves or other implanted medical devices like a pacemaker, defibrillator, or continuous glucose monitor? No   16. Do you have artificial joints? No   17. Are you allergic to latex? No   18. Is there any chance that you may be pregnant? No     Health Care Directive:  Patient does not have a Health Care Directive or Living Will: Discussed advance care planning with patient; however, patient declined at this time.    Preoperative Review of :   reviewed - controlled substances reflected in medication list.      Status of Chronic Conditions:  See problem list for active medical problems.  Problems all longstanding and stable, except as noted/documented.  See ROS for pertinent symptoms related  to these conditions.      Review of Systems  CONSTITUTIONAL: NEGATIVE for fever, chills, change in weight  INTEGUMENTARY/SKIN: NEGATIVE for worrisome rashes, moles or lesions  EYES: NEGATIVE for vision changes or irritation  ENT/MOUTH: NEGATIVE for ear, mouth and throat problems  RESP: NEGATIVE for significant cough or SOB  CV: NEGATIVE for chest pain, palpitations or peripheral edema  GI: NEGATIVE for nausea, abdominal pain, heartburn, or change in bowel habits  : NEGATIVE for frequency, dysuria, or hematuria  MUSCULOSKELETAL: NEGATIVE for significant arthralgias or myalgia  NEURO: NEGATIVE for weakness, dizziness or paresthesias  ENDOCRINE: NEGATIVE for temperature intolerance, skin/hair changes  HEME: NEGATIVE for bleeding problems  PSYCHIATRIC: NEGATIVE for changes in mood or affect    Patient Active Problem List    Diagnosis Date Noted     Decreased ferritin 01/08/2021     Priority: Medium     Ligament tear 06/24/2020     Priority: Medium     Added automatically from request for surgery [id number]       ACP (advance care planning) 03/10/2016     Priority: Medium     Advance Care Planning 3/10/2016: ACP Review of Chart / Resources Provided:  Reviewed chart for advance care plan.  Vesna KEYES Brennenluis eduardo has been provided information and resources to begin or update their advance care plan.  Added by Kenisha Chilel             NO SHOW 06/18/2015     Priority: Medium     No shows for Dr. Valdez : 5/15/13; 4/29/15         ASCUS (atypical squamous cells of undetermined significance) on Pap smear 04/29/2014     Priority: Medium     Encounter for routine gynecological examination 04/21/2014     Priority: Medium     Problem list name updated by automated process. Provider to review       Decreased libido 04/21/2014     Priority: Medium     Sensate focus       Fatigue 04/21/2014     Priority: Medium     Smoker 04/21/2014     Priority: Medium     Ready to quit. Only 3 cigs in a week       Family planning 04/22/2013     Priority:  Medium     Moderate major depression (H) 2013     Priority: Medium     Denies suicidal or homicidal ideations at this time. Working with RAÚL Molina       Anxiety 2013     Priority: Medium     Chronic fatigue 2013     Priority: Medium     Bipolar disorder, unspecified (H) 2012     Priority: Medium      Past Medical History:   Diagnosis Date     Attention deficit disorder without mention of hyperactivity 2000     Bipolar affective disorder 2012     Bulimia 2012     Self-injurious behavior 2012     Suicide attempt 2012     Past Surgical History:   Procedure Laterality Date     cyst ovary      LT     MAMMOPLASTY REDUCTION       REPAIR TENDON PATELLA Left 7/10/2020    Procedure: Left knee patellorfemoral ligament repair;  Surgeon: Melchor Perkins DO;  Location: [facility]     wisdom teeth removed       Current Outpatient Medications   Medication Sig Dispense Refill     lisdexamfetamine (VYVANSE) 60 MG capsule Take 1 capsule (60 mg) by mouth daily 30 capsule 0     vitamin D2 (ERGOCALCIFEROL) 25986 units (1250 mcg) capsule Take 1 capsule (50,000 Units) by mouth once a week 12 capsule 3       No Known Allergies     Social History     Tobacco Use     Smoking status: Former Smoker     Years: 10.00     Types: Cigarettes     Quit date: 2020     Years since quittin.1     Smokeless tobacco: Never Used     Tobacco comment: quit . longest tobacco free 1 year. no passive exposure   Substance Use Topics     Alcohol use: Yes     Comment: rarely     Family History   Problem Relation Age of Onset     Cancer Maternal Grandmother         pancreatic cancer; cause of death     Cancer Father         rectal     Colon Cancer Father      Heart Disease Paternal Grandmother         heart disease     Hypertension Mother      History   Drug Use No         Objective     /74 (BP Location: Left arm, Patient Position: Sitting, Cuff Size: Adult Regular)   Pulse 99   Temp 97.5  F (36.4  " C) (Tympanic)   Ht 1.651 m (5' 5\")   Wt 86.5 kg (190 lb 12.8 oz)   SpO2 98%   BMI 31.75 kg/m      Physical Exam    GENERAL APPEARANCE: healthy, alert and no distress     EYES: EOMI, PERRL     HENT: ear canals and TM's normal and nose and mouth without ulcers or lesions     NECK: no adenopathy, no asymmetry, masses, or scars and thyroid normal to palpation     RESP: lungs clear to auscultation - no rales, rhonchi or wheezes     CV: regular rates and rhythm, normal S1 S2, no S3 or S4 and no murmur, click or rub     ABDOMEN:  soft, nontender, no HSM or masses and bowel sounds normal     MS: extremities normal- no gross deformities noted, no evidence of inflammation in joints, FROM in all extremities.     SKIN: no suspicious lesions or rashes     NEURO: Normal strength and tone, sensory exam grossly normal, mentation intact and speech normal     PSYCH: mentation appears normal. and affect normal/bright     LYMPHATICS: No cervical adenopathy    Recent Labs   Lab Test 01/12/22  0930 07/07/20  1111   HGB 15.4 14.3    409    138   POTASSIUM 4.1 3.6   CR 0.65 0.63        Diagnostics:  No results found for this or any previous visit (from the past 24 hour(s)).   No EKG required, no history of coronary heart disease, significant arrhythmia, peripheral arterial disease or other structural heart disease.    Revised Cardiac Risk Index (RCRI):  The patient has the following serious cardiovascular risks for perioperative complications:   - No serious cardiac risks = 0 points     RCRI Interpretation: 0 points: Class I (very low risk - 0.4% complication rate)           Signed Electronically by: PRUDENCE Jj  Copy of this evaluation report is provided to requesting physician.      "

## 2022-03-23 NOTE — H&P (VIEW-ONLY)
[facility] [place]  [address]  [facility] [address]  Phone: [phone]  Primary Provider: Dixie Husain  Pre-op Performing Provider: DIXIE HUSAIN      PREOPERATIVE EVALUATION:  Today's date: 3/25/2022    Vesna Fabian is a 36 year old female who presents for a preoperative evaluation.    Surgical Information:  Surgery/Procedure: Hysterectomy   [place] [facility] [place] Dr. Cain  Surgery Date: 4/6/2022  Time of Surgery: tbd  Where patient plans to recover: Other: 1 night in hospital then gets to go home  Fax number for surgical facility: Note does not need to be faxed, will be available electronically in Epic.    Type of Anesthesia Anticipated: to be determined    Assessment & Plan     The proposed surgical procedure is considered INTERMEDIATE risk.    Preop general physical exam  Having a hysterectomy laparoscopic assisted.  Has some endometriosis they will also take care of .  She will have her ovaries remaining.    - Basic metabolic panel; Future  - CBC with platelets and differential; Future  - HCG Qual, Urine ([id number]); Future         Risks and Recommendations:  The patient has the following additional risks and recommendations for perioperative complications:      Medication Instructions:  Patient is to take all scheduled medications on the day of surgery EXCEPT for modifications listed below:    RECOMMENDATION:  APPROVAL GIVEN to proceed with proposed procedure pending review of diagnostic evaluation.  5 minutes in chart review.     5 minutes spent on the date of the encounter doing chart review, patient visit and documentation         Subjective     HPI related to upcoming procedure: she has severe menorrhagia and recurrent issues with heavy bleeding and endometriosis.  Will be having a Hysterectomy via laparoscope open is possible,  Has had major surgery in the past and tolerate anesthesia.        Preop Questions 3/25/2022   1. Have you ever had a heart attack or stroke?  No   2. Have you ever had surgery on your heart or blood vessels, such as a stent placement, a coronary artery bypass, or surgery on an artery in your head, neck, heart, or legs? No   3. Do you have chest pain with activity? No   4. Do you have a history of  heart failure? No   5. Do you currently have a cold, bronchitis or symptoms of other infection? No   6. Do you have a cough, shortness of breath, or wheezing? No   7. Do you or anyone in your family have previous history of blood clots? No   8. Do you or does anyone in your family have a serious bleeding problem such as prolonged bleeding following surgeries or cuts? No   9. Have you ever had problems with anemia or been told to take iron pills? YES - had low ferritin on 1/21. Ended up with iron infusions.    10. Have you had any abnormal blood loss such as black, tarry or bloody stools, or abnormal vaginal bleeding? No   11. Have you ever had a blood transfusion? No   12. Are you willing to have a blood transfusion if it is medically needed before, during, or after your surgery? Yes   13. Have you or any of your relatives ever had problems with anesthesia? No   14. Do you have sleep apnea, excessive snoring or daytime drowsiness? No   15. Do you have any artifical heart valves or other implanted medical devices like a pacemaker, defibrillator, or continuous glucose monitor? No   16. Do you have artificial joints? No   17. Are you allergic to latex? No   18. Is there any chance that you may be pregnant? No     Health Care Directive:  Patient does not have a Health Care Directive or Living Will: Discussed advance care planning with patient; however, patient declined at this time.    Preoperative Review of :   reviewed - controlled substances reflected in medication list.      Status of Chronic Conditions:  See problem list for active medical problems.  Problems all longstanding and stable, except as noted/documented.  See ROS for pertinent symptoms related  to these conditions.      Review of Systems  CONSTITUTIONAL: NEGATIVE for fever, chills, change in weight  INTEGUMENTARY/SKIN: NEGATIVE for worrisome rashes, moles or lesions  EYES: NEGATIVE for vision changes or irritation  ENT/MOUTH: NEGATIVE for ear, mouth and throat problems  RESP: NEGATIVE for significant cough or SOB  CV: NEGATIVE for chest pain, palpitations or peripheral edema  GI: NEGATIVE for nausea, abdominal pain, heartburn, or change in bowel habits  : NEGATIVE for frequency, dysuria, or hematuria  MUSCULOSKELETAL: NEGATIVE for significant arthralgias or myalgia  NEURO: NEGATIVE for weakness, dizziness or paresthesias  ENDOCRINE: NEGATIVE for temperature intolerance, skin/hair changes  HEME: NEGATIVE for bleeding problems  PSYCHIATRIC: NEGATIVE for changes in mood or affect    Patient Active Problem List    Diagnosis Date Noted     Decreased ferritin 01/08/2021     Priority: Medium     Ligament tear 06/24/2020     Priority: Medium     Added automatically from request for surgery [id number]       ACP (advance care planning) 03/10/2016     Priority: Medium     Advance Care Planning 3/10/2016: ACP Review of Chart / Resources Provided:  Reviewed chart for advance care plan.  Vesna KEYES Brennenluis eduardo has been provided information and resources to begin or update their advance care plan.  Added by Kenisha Chilel             NO SHOW 06/18/2015     Priority: Medium     No shows for Dr. Valdez : 5/15/13; 4/29/15         ASCUS (atypical squamous cells of undetermined significance) on Pap smear 04/29/2014     Priority: Medium     Encounter for routine gynecological examination 04/21/2014     Priority: Medium     Problem list name updated by automated process. Provider to review       Decreased libido 04/21/2014     Priority: Medium     Sensate focus       Fatigue 04/21/2014     Priority: Medium     Smoker 04/21/2014     Priority: Medium     Ready to quit. Only 3 cigs in a week       Family planning 04/22/2013     Priority:  Medium     Moderate major depression (H) 2013     Priority: Medium     Denies suicidal or homicidal ideations at this time. Working with RAÚL Molina       Anxiety 2013     Priority: Medium     Chronic fatigue 2013     Priority: Medium     Bipolar disorder, unspecified (H) 2012     Priority: Medium      Past Medical History:   Diagnosis Date     Attention deficit disorder without mention of hyperactivity 2000     Bipolar affective disorder 2012     Bulimia 2012     Self-injurious behavior 2012     Suicide attempt 2012     Past Surgical History:   Procedure Laterality Date     cyst ovary      LT     MAMMOPLASTY REDUCTION       REPAIR TENDON PATELLA Left 7/10/2020    Procedure: Left knee patellorfemoral ligament repair;  Surgeon: Melchor Perkins DO;  Location: [facility] OR     wisdom teeth removed       Current Outpatient Medications   Medication Sig Dispense Refill     lisdexamfetamine (VYVANSE) 60 MG capsule Take 1 capsule (60 mg) by mouth daily 30 capsule 0     vitamin D2 (ERGOCALCIFEROL) 11419 units (1250 mcg) capsule Take 1 capsule (50,000 Units) by mouth once a week 12 capsule 3       No Known Allergies     Social History     Tobacco Use     Smoking status: Former Smoker     Years: 10.00     Types: Cigarettes     Quit date: 2020     Years since quittin.1     Smokeless tobacco: Never Used     Tobacco comment: quit . longest tobacco free 1 year. no passive exposure   Substance Use Topics     Alcohol use: Yes     Comment: rarely     Family History   Problem Relation Age of Onset     Cancer Maternal Grandmother         pancreatic cancer; cause of death     Cancer Father         rectal     Colon Cancer Father      Heart Disease Paternal Grandmother         heart disease     Hypertension Mother      History   Drug Use No         Objective     /74 (BP Location: Left arm, Patient Position: Sitting, Cuff Size: Adult Regular)   Pulse 99   Temp 97.5  F (36.4  " C) (Tympanic)   Ht 1.651 m (5' 5\")   Wt 86.5 kg (190 lb 12.8 oz)   SpO2 98%   BMI 31.75 kg/m      Physical Exam    GENERAL APPEARANCE: healthy, alert and no distress     EYES: EOMI, PERRL     HENT: ear canals and TM's normal and nose and mouth without ulcers or lesions     NECK: no adenopathy, no asymmetry, masses, or scars and thyroid normal to palpation     RESP: lungs clear to auscultation - no rales, rhonchi or wheezes     CV: regular rates and rhythm, normal S1 S2, no S3 or S4 and no murmur, click or rub     ABDOMEN:  soft, nontender, no HSM or masses and bowel sounds normal     MS: extremities normal- no gross deformities noted, no evidence of inflammation in joints, FROM in all extremities.     SKIN: no suspicious lesions or rashes     NEURO: Normal strength and tone, sensory exam grossly normal, mentation intact and speech normal     PSYCH: mentation appears normal. and affect normal/bright     LYMPHATICS: No cervical adenopathy    Recent Labs   Lab Test 01/12/22  0930 07/07/20  1111   HGB 15.4 14.3    409    138   POTASSIUM 4.1 3.6   CR 0.65 0.63        Diagnostics:  No results found for this or any previous visit (from the past 24 hour(s)).   No EKG required, no history of coronary heart disease, significant arrhythmia, peripheral arterial disease or other structural heart disease.    Revised Cardiac Risk Index (RCRI):  The patient has the following serious cardiovascular risks for perioperative complications:   - No serious cardiac risks = 0 points     RCRI Interpretation: 0 points: Class I (very low risk - 0.4% complication rate)           Signed Electronically by: PRUDENCE Jj  Copy of this evaluation report is provided to requesting physician.      "

## 2022-03-25 ENCOUNTER — OFFICE VISIT (OUTPATIENT)
Dept: FAMILY MEDICINE | Facility: OTHER | Age: 37
End: 2022-03-25
Attending: PHYSICIAN ASSISTANT
Payer: COMMERCIAL

## 2022-03-25 VITALS
TEMPERATURE: 97.5 F | SYSTOLIC BLOOD PRESSURE: 122 MMHG | OXYGEN SATURATION: 98 % | BODY MASS INDEX: 31.79 KG/M2 | WEIGHT: 190.8 LBS | DIASTOLIC BLOOD PRESSURE: 74 MMHG | HEIGHT: 65 IN | HEART RATE: 99 BPM

## 2022-03-25 DIAGNOSIS — Z01.818 PREOP GENERAL PHYSICAL EXAM: Primary | ICD-10-CM

## 2022-03-25 LAB
ANION GAP SERPL CALCULATED.3IONS-SCNC: 3 MMOL/L (ref 3–14)
BASOPHILS # BLD AUTO: 0.1 10E3/UL (ref 0–0.2)
BASOPHILS NFR BLD AUTO: 1 %
BUN SERPL-MCNC: 7 MG/DL (ref 7–30)
CALCIUM SERPL-MCNC: 9.2 MG/DL (ref 8.5–10.1)
CHLORIDE BLD-SCNC: 107 MMOL/L (ref 94–109)
CO2 SERPL-SCNC: 27 MMOL/L (ref 20–32)
CREAT SERPL-MCNC: 0.7 MG/DL (ref 0.52–1.04)
EOSINOPHIL # BLD AUTO: 0.2 10E3/UL (ref 0–0.7)
EOSINOPHIL NFR BLD AUTO: 2 %
ERYTHROCYTE [DISTWIDTH] IN BLOOD BY AUTOMATED COUNT: 12.7 % (ref 10–15)
GFR SERPL CREATININE-BSD FRML MDRD: >90 ML/MIN/1.73M2
GLUCOSE BLD-MCNC: 88 MG/DL (ref 70–99)
HCG UR QL: NEGATIVE
HCT VFR BLD AUTO: 46.5 % (ref 35–47)
HGB BLD-MCNC: 16.1 G/DL (ref 11.7–15.7)
IMM GRANULOCYTES # BLD: 0.1 10E3/UL
IMM GRANULOCYTES NFR BLD: 1 %
LYMPHOCYTES # BLD AUTO: 1.3 10E3/UL (ref 0.8–5.3)
LYMPHOCYTES NFR BLD AUTO: 12 %
MCH RBC QN AUTO: 29.9 PG (ref 26.5–33)
MCHC RBC AUTO-ENTMCNC: 34.6 G/DL (ref 31.5–36.5)
MCV RBC AUTO: 86 FL (ref 78–100)
MONOCYTES # BLD AUTO: 0.4 10E3/UL (ref 0–1.3)
MONOCYTES NFR BLD AUTO: 4 %
NEUTROPHILS # BLD AUTO: 8.8 10E3/UL (ref 1.6–8.3)
NEUTROPHILS NFR BLD AUTO: 80 %
NRBC # BLD AUTO: 0 10E3/UL
NRBC BLD AUTO-RTO: 0 /100
PLATELET # BLD AUTO: 385 10E3/UL (ref 150–450)
POTASSIUM BLD-SCNC: 3.8 MMOL/L (ref 3.4–5.3)
RBC # BLD AUTO: 5.39 10E6/UL (ref 3.8–5.2)
SODIUM SERPL-SCNC: 137 MMOL/L (ref 133–144)
WBC # BLD AUTO: 10.8 10E3/UL (ref 4–11)

## 2022-03-25 PROCEDURE — 80048 BASIC METABOLIC PNL TOTAL CA: CPT | Performed by: PHYSICIAN ASSISTANT

## 2022-03-25 PROCEDURE — 99214 OFFICE O/P EST MOD 30 MIN: CPT | Performed by: PHYSICIAN ASSISTANT

## 2022-03-25 PROCEDURE — 36415 COLL VENOUS BLD VENIPUNCTURE: CPT | Performed by: PHYSICIAN ASSISTANT

## 2022-03-25 PROCEDURE — 85025 COMPLETE CBC W/AUTO DIFF WBC: CPT | Performed by: PHYSICIAN ASSISTANT

## 2022-03-25 PROCEDURE — 81025 URINE PREGNANCY TEST: CPT | Performed by: PHYSICIAN ASSISTANT

## 2022-03-25 ASSESSMENT — ANXIETY QUESTIONNAIRES
3. WORRYING TOO MUCH ABOUT DIFFERENT THINGS: NOT AT ALL
2. NOT BEING ABLE TO STOP OR CONTROL WORRYING: NOT AT ALL
GAD7 TOTAL SCORE: 0
7. FEELING AFRAID AS IF SOMETHING AWFUL MIGHT HAPPEN: NOT AT ALL
6. BECOMING EASILY ANNOYED OR IRRITABLE: NOT AT ALL
4. TROUBLE RELAXING: NOT AT ALL
5. BEING SO RESTLESS THAT IT IS HARD TO SIT STILL: NOT AT ALL
1. FEELING NERVOUS, ANXIOUS, OR ON EDGE: NOT AT ALL

## 2022-03-25 ASSESSMENT — PATIENT HEALTH QUESTIONNAIRE - PHQ9: SUM OF ALL RESPONSES TO PHQ QUESTIONS 1-9: 0

## 2022-03-25 NOTE — PATIENT INSTRUCTIONS
Thank you for choosing [facility].   I have office hours 8:00 am to 4:30 pm on Monday's, Wednesday's, Thursday's and Friday's. My nurse and I are out of the office every Tuesday.    Following your visit, when your labs and diagnostic testing have returned, I will review then and you will be contacted by my nurse.  If you are on My Chart, you can also view results there.    For refills, notify your pharmacy regarding what you need and the pharmacy will generate a refill request. Do not call my nurse as she is unable to process refill request. Please plan ahead and allow 3-5 days for refill requests.    You will generally receive a reminder call the day prior to your appointment.  If you cannot attend your appointment, please cancel your appointment with as much notice as possible.  If there is a pattern of failure to present for your appointments, I cannot provide consistent, meaningful, ongoing care for you. It is very important to me that you come in for your care, so we can best assist you with your health care needs.    IMPORTANT:  Please note that it is my standard of practice to NOT participate in prescribing ongoing requested Narcotic Analgesic therapy, and/or participate in the prescribing of other controlled substances.  My nurse and I am happy to assist you with the process of referral for alternative pain management as needed, and other treatment modalities including but not limited to:  Physical Therapy, Physical Medicine and Rehab, Counseling, Chiropractic Care, Orthopedic Care, and non-narcotic medication management.     In the event that you need to be seen for emergent concerns and I am out of office,  please see one of my colleagues for acute concerns.  You may also present to  or ER.  I appreciate the opportunity to serve you and look forward to supporting your healthcare needs in the future. Please contact me with any questions or concerns that you may  have.    Sincerely,      Dixie Chun RN, PA-C

## 2022-03-25 NOTE — NURSING NOTE
"Chief Complaint   Patient presents with     Pre-Op Exam       Initial /74 (BP Location: Left arm, Patient Position: Sitting, Cuff Size: Adult Regular)   Pulse 99   Temp 97.5  F (36.4  C) (Tympanic)   Ht 1.651 m (5' 5\")   Wt 86.5 kg (190 lb 12.8 oz)   SpO2 98%   BMI 31.75 kg/m   Estimated body mass index is 31.75 kg/m  as calculated from the following:    Height as of this encounter: 1.651 m (5' 5\").    Weight as of this encounter: 86.5 kg (190 lb 12.8 oz).  Medication Reconciliation: complete  Vesna Kahn LPN  "

## 2022-03-26 ASSESSMENT — ANXIETY QUESTIONNAIRES: GAD7 TOTAL SCORE: 0

## 2022-03-30 ENCOUNTER — ANESTHESIA EVENT (OUTPATIENT)
Dept: SURGERY | Facility: HOSPITAL | Age: 37
End: 2022-03-30
Payer: COMMERCIAL

## 2022-03-30 ENCOUNTER — MYC MEDICAL ADVICE (OUTPATIENT)
Dept: OBGYN | Facility: OTHER | Age: 37
End: 2022-03-30
Payer: COMMERCIAL

## 2022-03-30 RX ORDER — FENTANYL CITRATE 50 UG/ML
25 INJECTION, SOLUTION INTRAMUSCULAR; INTRAVENOUS
Status: CANCELLED | OUTPATIENT
Start: 2022-03-30

## 2022-03-30 NOTE — ANESTHESIA PREPROCEDURE EVALUATION
Anesthesia Pre-Procedure Evaluation    Patient: Vesna Fabian   MRN: [id number] : 1985        Procedure : Procedure(s):  LAPAROSCOPIC ASSISTED VAGINAL HYSTERECTOMY  BILATERAL SALPINGECTOMY          Past Medical History:   Diagnosis Date     Attention deficit disorder without mention of hyperactivity 2000     Bipolar affective disorder 2012     Bulimia 2012     Self-injurious behavior 2012     Suicide attempt 2012      Past Surgical History:   Procedure Laterality Date     cyst ovary      LT     MAMMOPLASTY REDUCTION  2011     REPAIR TENDON PATELLA Left 7/10/2020    Procedure: Left knee patellorfemoral ligament repair;  Surgeon: Melchor Perkins DO;  Location: [facility]     wisdom teeth removed        No Known Allergies   Social History     Tobacco Use     Smoking status: Former Smoker     Years: 10.00     Types: Cigarettes     Quit date: 2020     Years since quittin.1     Smokeless tobacco: Never Used     Tobacco comment: quit . longest tobacco free 1 year. no passive exposure   Substance Use Topics     Alcohol use: Yes     Comment: rarely      Wt Readings from Last 1 Encounters:   22 86.5 kg (190 lb 12.8 oz)        Anesthesia Evaluation   Pt has had prior anesthetic. Type: General.    No history of anesthetic complications       ROS/MED HX  ENT/Pulmonary:     (+) tobacco use (1/2ppd x 20 years), Current use,     Neurologic:  - neg neurologic ROS     Cardiovascular:  - neg cardiovascular ROS     METS/Exercise Tolerance: >4 METS    Hematologic: Comments: Iron infusions      (+) anemia,     Musculoskeletal:  - neg musculoskeletal ROS     GI/Hepatic:  - neg GI/hepatic ROS     Renal/Genitourinary:  - neg Renal ROS     Endo:  - neg endo ROS     Psychiatric/Substance Use: Comment: ADHD    (+) psychiatric history depression and bipolar     Infectious Disease:  - neg infectious disease ROS     Malignancy:  - neg malignancy ROS     Other:  - neg other ROS          Physical  Exam    Airway  airway exam normal      Mallampati: I   TM distance: > 3 FB   Neck ROM: full   Mouth opening: > 3 cm    Respiratory Devices and Support         Dental  no notable dental history         Cardiovascular   cardiovascular exam normal       Rhythm and rate: regular and normal     Pulmonary   pulmonary exam normal        breath sounds clear to auscultation           OUTSIDE LABS:  CBC:   Lab Results   Component Value Date    WBC 10.8 03/25/2022    WBC 10.0 01/12/2022    HGB 16.1 (H) 03/25/2022    HGB 15.4 01/12/2022    HCT 46.5 03/25/2022    HCT 45.7 01/12/2022     03/25/2022     01/12/2022     BMP:   Lab Results   Component Value Date     03/25/2022     01/12/2022    POTASSIUM 3.8 03/25/2022    POTASSIUM 4.1 01/12/2022    CHLORIDE 107 03/25/2022    CHLORIDE 104 01/12/2022    CO2 27 03/25/2022    CO2 30 01/12/2022    BUN 7 03/25/2022    BUN 7 01/12/2022    CR 0.70 03/25/2022    CR 0.65 01/12/2022    GLC 88 03/25/2022    GLC 89 01/12/2022     COAGS: No results found for: PTT, INR, FIBR  POC:   Lab Results   Component Value Date    HCG Negative 03/25/2022    HCGS Negative 08/13/2013     HEPATIC:   Lab Results   Component Value Date    ALBUMIN 3.9 07/07/2020    PROTTOTAL 8.0 07/07/2020    ALT 42 07/07/2020    AST 20 07/07/2020    ALKPHOS 99 07/07/2020    BILITOTAL 0.5 07/07/2020     OTHER:   Lab Results   Component Value Date    EUSEBIO 9.2 03/25/2022    LIPASE 72 (L) 03/09/2018    TSH 1.86 01/12/2022       Anesthesia Plan    ASA Status:  2   NPO Status:  NPO Appropriate    Anesthesia Type: General.     - Airway: ETT   Induction: Intravenous.   Maintenance: Balanced.        Consents    Anesthesia Plan(s) and associated risks, benefits, and realistic alternatives discussed. Questions answered and patient/representative(s) expressed understanding.     - Discussed: Risks, Benefits and Alternatives for BOTH SEDATION and the PROCEDURE were discussed     - Discussed with:  Patient          Postoperative Care       PONV prophylaxis: Dexamethasone or Solumedrol, Ondansetron (or other 5HT-3)     Comments:    Other Comments:  3-25-22  Discussed risks and benefits with patient for general anesthesia including sore throat, nausea, vomiting, aspiration, dental damage, loss of airway, CV complications, stroke, MI, death. Pt wishes to proceed.             Reuben Leonard, APRN CRNA

## 2022-04-02 ENCOUNTER — OFFICE VISIT (OUTPATIENT)
Dept: FAMILY MEDICINE | Facility: OTHER | Age: 37
End: 2022-04-02
Attending: OBSTETRICS & GYNECOLOGY
Payer: COMMERCIAL

## 2022-04-02 DIAGNOSIS — Z01.812 ENCOUNTER FOR PREOPERATIVE SCREENING LABORATORY TESTING FOR COVID-19 VIRUS: ICD-10-CM

## 2022-04-02 DIAGNOSIS — Z11.52 ENCOUNTER FOR PREOPERATIVE SCREENING LABORATORY TESTING FOR COVID-19 VIRUS: ICD-10-CM

## 2022-04-02 PROCEDURE — U0005 INFEC AGEN DETEC AMPLI PROBE: HCPCS

## 2022-04-02 PROCEDURE — U0003 INFECTIOUS AGENT DETECTION BY NUCLEIC ACID (DNA OR RNA); SEVERE ACUTE RESPIRATORY SYNDROME CORONAVIRUS 2 (SARS-COV-2) (CORONAVIRUS DISEASE [COVID-19]), AMPLIFIED PROBE TECHNIQUE, MAKING USE OF HIGH THROUGHPUT TECHNOLOGIES AS DESCRIBED BY CMS-2020-01-R: HCPCS

## 2022-04-03 LAB — SARS-COV-2 RNA RESP QL NAA+PROBE: NEGATIVE

## 2022-04-05 RX ORDER — CEFAZOLIN SODIUM 2 G/100ML
2 INJECTION, SOLUTION INTRAVENOUS
Status: DISCONTINUED | OUTPATIENT
Start: 2022-04-06 | End: 2022-04-06

## 2022-04-05 RX ORDER — CEFAZOLIN SODIUM/WATER 2 G/20 ML
2 SYRINGE (ML) INTRAVENOUS
Status: CANCELLED | OUTPATIENT
Start: 2022-04-06

## 2022-04-06 ENCOUNTER — APPOINTMENT (OUTPATIENT)
Dept: LAB | Facility: HOSPITAL | Age: 37
End: 2022-04-06
Attending: OBSTETRICS & GYNECOLOGY
Payer: COMMERCIAL

## 2022-04-06 ENCOUNTER — ANESTHESIA (OUTPATIENT)
Dept: SURGERY | Facility: HOSPITAL | Age: 37
End: 2022-04-06
Payer: COMMERCIAL

## 2022-04-06 ENCOUNTER — HOSPITAL ENCOUNTER (OUTPATIENT)
Facility: HOSPITAL | Age: 37
Discharge: HOME | End: 2022-04-07
Attending: OBSTETRICS & GYNECOLOGY | Admitting: OBSTETRICS & GYNECOLOGY
Payer: COMMERCIAL

## 2022-04-06 DIAGNOSIS — Z90.710 S/P HYSTERECTOMY: Primary | ICD-10-CM

## 2022-04-06 LAB
ABO/RH(D): NORMAL
ANTIBODY SCREEN: NEGATIVE
HCG UR QL: NEGATIVE
SPECIMEN EXPIRATION DATE: NORMAL

## 2022-04-06 PROCEDURE — 258N000003 HC RX IP 258 OP 636: Performed by: NURSE ANESTHETIST, CERTIFIED REGISTERED

## 2022-04-06 PROCEDURE — 999N000157 HC STATISTIC RCP TIME EA 10 MIN

## 2022-04-06 PROCEDURE — 250N000009 HC RX 250: Performed by: OBSTETRICS & GYNECOLOGY

## 2022-04-06 PROCEDURE — 58552 LAPARO-VAG HYST INCL T/O: CPT | Performed by: NURSE ANESTHETIST, CERTIFIED REGISTERED

## 2022-04-06 PROCEDURE — 88307 TISSUE EXAM BY PATHOLOGIST: CPT | Mod: TC | Performed by: OBSTETRICS & GYNECOLOGY

## 2022-04-06 PROCEDURE — 58571 TLH W/T/O 250 G OR LESS: CPT | Performed by: OBSTETRICS & GYNECOLOGY

## 2022-04-06 PROCEDURE — 86901 BLOOD TYPING SEROLOGIC RH(D): CPT | Performed by: OBSTETRICS & GYNECOLOGY

## 2022-04-06 PROCEDURE — 272N000001 HC OR GENERAL SUPPLY STERILE: Performed by: OBSTETRICS & GYNECOLOGY

## 2022-04-06 PROCEDURE — 710N000010 HC RECOVERY PHASE 1, LEVEL 2, PER MIN: Performed by: OBSTETRICS & GYNECOLOGY

## 2022-04-06 PROCEDURE — 250N000011 HC RX IP 250 OP 636: Performed by: NURSE ANESTHETIST, CERTIFIED REGISTERED

## 2022-04-06 PROCEDURE — 250N000009 HC RX 250: Performed by: NURSE ANESTHETIST, CERTIFIED REGISTERED

## 2022-04-06 PROCEDURE — 250N000013 HC RX MED GY IP 250 OP 250 PS 637: Performed by: NURSE ANESTHETIST, CERTIFIED REGISTERED

## 2022-04-06 PROCEDURE — 250N000013 HC RX MED GY IP 250 OP 250 PS 637: Performed by: OBSTETRICS & GYNECOLOGY

## 2022-04-06 PROCEDURE — 81025 URINE PREGNANCY TEST: CPT | Performed by: OBSTETRICS & GYNECOLOGY

## 2022-04-06 PROCEDURE — 999N000141 HC STATISTIC PRE-PROCEDURE NURSING ASSESSMENT: Performed by: OBSTETRICS & GYNECOLOGY

## 2022-04-06 PROCEDURE — 360N000077 HC SURGERY LEVEL 4, PER MIN: Performed by: OBSTETRICS & GYNECOLOGY

## 2022-04-06 PROCEDURE — 88307 TISSUE EXAM BY PATHOLOGIST: CPT | Mod: 26 | Performed by: PATHOLOGY

## 2022-04-06 PROCEDURE — 36415 COLL VENOUS BLD VENIPUNCTURE: CPT | Performed by: OBSTETRICS & GYNECOLOGY

## 2022-04-06 PROCEDURE — 370N000017 HC ANESTHESIA TECHNICAL FEE, PER MIN: Performed by: OBSTETRICS & GYNECOLOGY

## 2022-04-06 PROCEDURE — 250N000025 HC SEVOFLURANE, PER MIN: Performed by: OBSTETRICS & GYNECOLOGY

## 2022-04-06 PROCEDURE — 250N000011 HC RX IP 250 OP 636: Performed by: OBSTETRICS & GYNECOLOGY

## 2022-04-06 PROCEDURE — 58571 TLH W/T/O 250 G OR LESS: CPT | Mod: AS | Performed by: NURSE PRACTITIONER

## 2022-04-06 RX ORDER — DEXAMETHASONE SODIUM PHOSPHATE 10 MG/ML
INJECTION, SOLUTION INTRAMUSCULAR; INTRAVENOUS PRN
Status: DISCONTINUED | OUTPATIENT
Start: 2022-04-06 | End: 2022-04-06

## 2022-04-06 RX ORDER — LIDOCAINE 40 MG/G
CREAM TOPICAL
Status: DISCONTINUED | OUTPATIENT
Start: 2022-04-06 | End: 2022-04-07 | Stop reason: HOSPADM

## 2022-04-06 RX ORDER — NALOXONE HYDROCHLORIDE 0.4 MG/ML
0.2 INJECTION, SOLUTION INTRAMUSCULAR; INTRAVENOUS; SUBCUTANEOUS
Status: DISCONTINUED | OUTPATIENT
Start: 2022-04-06 | End: 2022-04-07 | Stop reason: HOSPADM

## 2022-04-06 RX ORDER — OXYCODONE HYDROCHLORIDE 5 MG/1
10 TABLET ORAL EVERY 4 HOURS PRN
Status: DISCONTINUED | OUTPATIENT
Start: 2022-04-06 | End: 2022-04-07 | Stop reason: HOSPADM

## 2022-04-06 RX ORDER — ONDANSETRON 2 MG/ML
INJECTION INTRAMUSCULAR; INTRAVENOUS PRN
Status: DISCONTINUED | OUTPATIENT
Start: 2022-04-06 | End: 2022-04-06

## 2022-04-06 RX ORDER — HYDROMORPHONE HCL IN WATER/PF 6 MG/30 ML
0.2 PATIENT CONTROLLED ANALGESIA SYRINGE INTRAVENOUS
Status: DISCONTINUED | OUTPATIENT
Start: 2022-04-06 | End: 2022-04-07 | Stop reason: HOSPADM

## 2022-04-06 RX ORDER — HYDROMORPHONE HCL IN WATER/PF 6 MG/30 ML
0.4 PATIENT CONTROLLED ANALGESIA SYRINGE INTRAVENOUS
Status: DISCONTINUED | OUTPATIENT
Start: 2022-04-06 | End: 2022-04-07 | Stop reason: HOSPADM

## 2022-04-06 RX ORDER — METRONIDAZOLE 500 MG/100ML
500 INJECTION, SOLUTION INTRAVENOUS ONCE
Status: COMPLETED | OUTPATIENT
Start: 2022-04-06 | End: 2022-04-06

## 2022-04-06 RX ORDER — KETOROLAC TROMETHAMINE 15 MG/ML
15 INJECTION, SOLUTION INTRAMUSCULAR; INTRAVENOUS EVERY 6 HOURS
Status: DISCONTINUED | OUTPATIENT
Start: 2022-04-06 | End: 2022-04-07 | Stop reason: HOSPADM

## 2022-04-06 RX ORDER — BISACODYL 10 MG
10 SUPPOSITORY, RECTAL RECTAL DAILY PRN
Status: DISCONTINUED | OUTPATIENT
Start: 2022-04-06 | End: 2022-04-07 | Stop reason: HOSPADM

## 2022-04-06 RX ORDER — ONDANSETRON 2 MG/ML
4 INJECTION INTRAMUSCULAR; INTRAVENOUS EVERY 6 HOURS PRN
Status: DISCONTINUED | OUTPATIENT
Start: 2022-04-06 | End: 2022-04-07 | Stop reason: HOSPADM

## 2022-04-06 RX ORDER — GLYCOPYRROLATE 0.2 MG/ML
INJECTION, SOLUTION INTRAMUSCULAR; INTRAVENOUS PRN
Status: DISCONTINUED | OUTPATIENT
Start: 2022-04-06 | End: 2022-04-06

## 2022-04-06 RX ORDER — OXYCODONE HYDROCHLORIDE 5 MG/1
5 TABLET ORAL EVERY 4 HOURS PRN
Status: DISCONTINUED | OUTPATIENT
Start: 2022-04-06 | End: 2022-04-06 | Stop reason: HOSPADM

## 2022-04-06 RX ORDER — HYDRALAZINE HYDROCHLORIDE 20 MG/ML
2.5-5 INJECTION INTRAMUSCULAR; INTRAVENOUS EVERY 10 MIN PRN
Status: DISCONTINUED | OUTPATIENT
Start: 2022-04-06 | End: 2022-04-06 | Stop reason: HOSPADM

## 2022-04-06 RX ORDER — PHENAZOPYRIDINE HYDROCHLORIDE 100 MG/1
200 TABLET, FILM COATED ORAL ONCE
Status: COMPLETED | OUTPATIENT
Start: 2022-04-06 | End: 2022-04-06

## 2022-04-06 RX ORDER — CEFAZOLIN SODIUM 2 G/100ML
2 INJECTION, SOLUTION INTRAVENOUS
Status: DISCONTINUED | OUTPATIENT
Start: 2022-04-06 | End: 2022-04-06

## 2022-04-06 RX ORDER — SODIUM CHLORIDE, SODIUM LACTATE, POTASSIUM CHLORIDE, CALCIUM CHLORIDE 600; 310; 30; 20 MG/100ML; MG/100ML; MG/100ML; MG/100ML
INJECTION, SOLUTION INTRAVENOUS CONTINUOUS
Status: DISCONTINUED | OUTPATIENT
Start: 2022-04-06 | End: 2022-04-07 | Stop reason: HOSPADM

## 2022-04-06 RX ORDER — HALOPERIDOL 5 MG/ML
0.5 INJECTION INTRAMUSCULAR
Status: DISCONTINUED | OUTPATIENT
Start: 2022-04-06 | End: 2022-04-06 | Stop reason: HOSPADM

## 2022-04-06 RX ORDER — FENTANYL CITRATE 50 UG/ML
INJECTION, SOLUTION INTRAMUSCULAR; INTRAVENOUS PRN
Status: DISCONTINUED | OUTPATIENT
Start: 2022-04-06 | End: 2022-04-06

## 2022-04-06 RX ORDER — CALCIUM CARBONATE 500 MG/1
500 TABLET, CHEWABLE ORAL 4 TIMES DAILY PRN
Status: DISCONTINUED | OUTPATIENT
Start: 2022-04-06 | End: 2022-04-07 | Stop reason: HOSPADM

## 2022-04-06 RX ORDER — MAGNESIUM SULFATE HEPTAHYDRATE 40 MG/ML
4 INJECTION, SOLUTION INTRAVENOUS ONCE
Status: COMPLETED | OUTPATIENT
Start: 2022-04-06 | End: 2022-04-06

## 2022-04-06 RX ORDER — NALOXONE HYDROCHLORIDE 0.4 MG/ML
0.2 INJECTION, SOLUTION INTRAMUSCULAR; INTRAVENOUS; SUBCUTANEOUS
Status: DISCONTINUED | OUTPATIENT
Start: 2022-04-06 | End: 2022-04-06 | Stop reason: HOSPADM

## 2022-04-06 RX ORDER — SODIUM CHLORIDE, SODIUM LACTATE, POTASSIUM CHLORIDE, CALCIUM CHLORIDE 600; 310; 30; 20 MG/100ML; MG/100ML; MG/100ML; MG/100ML
INJECTION, SOLUTION INTRAVENOUS CONTINUOUS
Status: DISCONTINUED | OUTPATIENT
Start: 2022-04-06 | End: 2022-04-06 | Stop reason: HOSPADM

## 2022-04-06 RX ORDER — FENTANYL CITRATE-0.9 % NACL/PF 10 MCG/ML
PLASTIC BAG, INJECTION (ML) INTRAVENOUS PRN
Status: DISCONTINUED | OUTPATIENT
Start: 2022-04-06 | End: 2022-04-06

## 2022-04-06 RX ORDER — ONDANSETRON 4 MG/1
4 TABLET, ORALLY DISINTEGRATING ORAL EVERY 30 MIN PRN
Status: DISCONTINUED | OUTPATIENT
Start: 2022-04-06 | End: 2022-04-06 | Stop reason: HOSPADM

## 2022-04-06 RX ORDER — KETOROLAC TROMETHAMINE 30 MG/ML
30 INJECTION, SOLUTION INTRAMUSCULAR; INTRAVENOUS ONCE
Status: DISCONTINUED | OUTPATIENT
Start: 2022-04-06 | End: 2022-04-06 | Stop reason: HOSPADM

## 2022-04-06 RX ORDER — NALOXONE HYDROCHLORIDE 0.4 MG/ML
0.4 INJECTION, SOLUTION INTRAMUSCULAR; INTRAVENOUS; SUBCUTANEOUS
Status: DISCONTINUED | OUTPATIENT
Start: 2022-04-06 | End: 2022-04-07 | Stop reason: HOSPADM

## 2022-04-06 RX ORDER — CEFAZOLIN SODIUM 2 G/100ML
2 INJECTION, SOLUTION INTRAVENOUS SEE ADMIN INSTRUCTIONS
Status: DISCONTINUED | OUTPATIENT
Start: 2022-04-06 | End: 2022-04-06 | Stop reason: HOSPADM

## 2022-04-06 RX ORDER — MEPERIDINE HYDROCHLORIDE 25 MG/ML
12.5 INJECTION INTRAMUSCULAR; INTRAVENOUS; SUBCUTANEOUS
Status: DISCONTINUED | OUTPATIENT
Start: 2022-04-06 | End: 2022-04-06 | Stop reason: HOSPADM

## 2022-04-06 RX ORDER — ACETAMINOPHEN 325 MG/1
650 TABLET ORAL EVERY 6 HOURS
Status: DISCONTINUED | OUTPATIENT
Start: 2022-04-09 | End: 2022-04-07 | Stop reason: HOSPADM

## 2022-04-06 RX ORDER — ACETAMINOPHEN 325 MG/1
975 TABLET ORAL EVERY 6 HOURS
Status: DISCONTINUED | OUTPATIENT
Start: 2022-04-06 | End: 2022-04-07 | Stop reason: HOSPADM

## 2022-04-06 RX ORDER — IBUPROFEN 200 MG
800 TABLET ORAL EVERY 6 HOURS
Status: DISCONTINUED | OUTPATIENT
Start: 2022-04-06 | End: 2022-04-07 | Stop reason: HOSPADM

## 2022-04-06 RX ORDER — TRANEXAMIC ACID 10 MG/ML
1 INJECTION, SOLUTION INTRAVENOUS ONCE
Status: COMPLETED | OUTPATIENT
Start: 2022-04-06 | End: 2022-04-06

## 2022-04-06 RX ORDER — NALOXONE HYDROCHLORIDE 0.4 MG/ML
0.4 INJECTION, SOLUTION INTRAMUSCULAR; INTRAVENOUS; SUBCUTANEOUS
Status: DISCONTINUED | OUTPATIENT
Start: 2022-04-06 | End: 2022-04-06 | Stop reason: HOSPADM

## 2022-04-06 RX ORDER — HYDROMORPHONE HYDROCHLORIDE 1 MG/ML
0.4 INJECTION, SOLUTION INTRAMUSCULAR; INTRAVENOUS; SUBCUTANEOUS EVERY 5 MIN PRN
Status: DISCONTINUED | OUTPATIENT
Start: 2022-04-06 | End: 2022-04-06 | Stop reason: HOSPADM

## 2022-04-06 RX ORDER — ONDANSETRON 4 MG/1
4 TABLET, ORALLY DISINTEGRATING ORAL EVERY 6 HOURS PRN
Status: DISCONTINUED | OUTPATIENT
Start: 2022-04-06 | End: 2022-04-07 | Stop reason: HOSPADM

## 2022-04-06 RX ORDER — CEFAZOLIN SODIUM/WATER 2 G/20 ML
2 SYRINGE (ML) INTRAVENOUS
Status: DISCONTINUED | OUTPATIENT
Start: 2022-04-06 | End: 2022-04-06

## 2022-04-06 RX ORDER — LIDOCAINE 40 MG/G
CREAM TOPICAL
Status: DISCONTINUED | OUTPATIENT
Start: 2022-04-06 | End: 2022-04-06 | Stop reason: HOSPADM

## 2022-04-06 RX ORDER — KETOROLAC TROMETHAMINE 30 MG/ML
INJECTION, SOLUTION INTRAMUSCULAR; INTRAVENOUS PRN
Status: DISCONTINUED | OUTPATIENT
Start: 2022-04-06 | End: 2022-04-06

## 2022-04-06 RX ORDER — ONDANSETRON 2 MG/ML
4 INJECTION INTRAMUSCULAR; INTRAVENOUS EVERY 30 MIN PRN
Status: DISCONTINUED | OUTPATIENT
Start: 2022-04-06 | End: 2022-04-06 | Stop reason: HOSPADM

## 2022-04-06 RX ORDER — AMOXICILLIN 250 MG
1 CAPSULE ORAL 2 TIMES DAILY
Status: DISCONTINUED | OUTPATIENT
Start: 2022-04-06 | End: 2022-04-07 | Stop reason: HOSPADM

## 2022-04-06 RX ORDER — KETAMINE HYDROCHLORIDE 10 MG/ML
INJECTION INTRAMUSCULAR; INTRAVENOUS PRN
Status: DISCONTINUED | OUTPATIENT
Start: 2022-04-06 | End: 2022-04-06

## 2022-04-06 RX ORDER — PROCHLORPERAZINE MALEATE 10 MG
10 TABLET ORAL EVERY 6 HOURS PRN
Status: DISCONTINUED | OUTPATIENT
Start: 2022-04-06 | End: 2022-04-07 | Stop reason: HOSPADM

## 2022-04-06 RX ORDER — PROPOFOL 10 MG/ML
INJECTION, EMULSION INTRAVENOUS PRN
Status: DISCONTINUED | OUTPATIENT
Start: 2022-04-06 | End: 2022-04-06

## 2022-04-06 RX ORDER — PROPOFOL 10 MG/ML
INJECTION, EMULSION INTRAVENOUS CONTINUOUS PRN
Status: DISCONTINUED | OUTPATIENT
Start: 2022-04-06 | End: 2022-04-06

## 2022-04-06 RX ORDER — LIDOCAINE HYDROCHLORIDE 20 MG/ML
INJECTION, SOLUTION INFILTRATION; PERINEURAL PRN
Status: DISCONTINUED | OUTPATIENT
Start: 2022-04-06 | End: 2022-04-06

## 2022-04-06 RX ORDER — ACETAMINOPHEN 325 MG/1
975 TABLET ORAL ONCE
Status: COMPLETED | OUTPATIENT
Start: 2022-04-06 | End: 2022-04-06

## 2022-04-06 RX ORDER — ALBUTEROL SULFATE 0.83 MG/ML
2.5 SOLUTION RESPIRATORY (INHALATION) EVERY 4 HOURS PRN
Status: DISCONTINUED | OUTPATIENT
Start: 2022-04-06 | End: 2022-04-06 | Stop reason: HOSPADM

## 2022-04-06 RX ORDER — HYDROXYZINE HYDROCHLORIDE 50 MG/ML
100 INJECTION, SOLUTION INTRAMUSCULAR EVERY 6 HOURS PRN
Status: DISCONTINUED | OUTPATIENT
Start: 2022-04-06 | End: 2022-04-07 | Stop reason: HOSPADM

## 2022-04-06 RX ORDER — ALBUTEROL SULFATE 90 UG/1
AEROSOL, METERED RESPIRATORY (INHALATION) PRN
Status: DISCONTINUED | OUTPATIENT
Start: 2022-04-06 | End: 2022-04-06

## 2022-04-06 RX ORDER — FENTANYL CITRATE 50 UG/ML
50 INJECTION, SOLUTION INTRAMUSCULAR; INTRAVENOUS EVERY 5 MIN PRN
Status: DISCONTINUED | OUTPATIENT
Start: 2022-04-06 | End: 2022-04-06 | Stop reason: HOSPADM

## 2022-04-06 RX ORDER — OXYCODONE HYDROCHLORIDE 5 MG/1
5 TABLET ORAL EVERY 4 HOURS PRN
Status: DISCONTINUED | OUTPATIENT
Start: 2022-04-06 | End: 2022-04-07 | Stop reason: HOSPADM

## 2022-04-06 RX ADMIN — DEXAMETHASONE SODIUM PHOSPHATE 10 MG: 10 INJECTION, SOLUTION INTRAMUSCULAR; INTRAVENOUS at 11:44

## 2022-04-06 RX ADMIN — MIDAZOLAM 2 MG: 1 INJECTION INTRAMUSCULAR; INTRAVENOUS at 11:20

## 2022-04-06 RX ADMIN — ROCURONIUM BROMIDE 10 MG: 10 INJECTION INTRAVENOUS at 12:16

## 2022-04-06 RX ADMIN — FENTANYL CITRATE 50 MCG: 50 INJECTION INTRAMUSCULAR; INTRAVENOUS at 14:07

## 2022-04-06 RX ADMIN — SENNOSIDES AND DOCUSATE SODIUM 1 TABLET: 50; 8.6 TABLET ORAL at 20:34

## 2022-04-06 RX ADMIN — SUGAMMADEX 200 MG: 100 INJECTION, SOLUTION INTRAVENOUS at 13:04

## 2022-04-06 RX ADMIN — KETOROLAC TROMETHAMINE 15 MG: 30 INJECTION, SOLUTION INTRAMUSCULAR at 12:28

## 2022-04-06 RX ADMIN — PROPOFOL 100 MCG/KG/MIN: 10 INJECTION, EMULSION INTRAVENOUS at 11:29

## 2022-04-06 RX ADMIN — ROCURONIUM BROMIDE 5 MG: 10 INJECTION INTRAVENOUS at 12:47

## 2022-04-06 RX ADMIN — KETAMINE HYDROCHLORIDE 20 MG: 10 INJECTION, SOLUTION INTRAMUSCULAR; INTRAVENOUS at 11:57

## 2022-04-06 RX ADMIN — PROPOFOL 50 MG: 10 INJECTION, EMULSION INTRAVENOUS at 12:16

## 2022-04-06 RX ADMIN — SODIUM CHLORIDE, POTASSIUM CHLORIDE, SODIUM LACTATE AND CALCIUM CHLORIDE: 600; 310; 30; 20 INJECTION, SOLUTION INTRAVENOUS at 10:07

## 2022-04-06 RX ADMIN — OXYCODONE HYDROCHLORIDE 5 MG: 5 TABLET ORAL at 20:34

## 2022-04-06 RX ADMIN — GLYCOPYRROLATE 0.2 MG: 0.2 INJECTION, SOLUTION INTRAMUSCULAR; INTRAVENOUS at 12:12

## 2022-04-06 RX ADMIN — ACETAMINOPHEN 325MG 975 MG: 325 TABLET ORAL at 23:54

## 2022-04-06 RX ADMIN — FENTANYL CITRATE 25 MCG: 50 INJECTION, SOLUTION INTRAMUSCULAR; INTRAVENOUS at 12:18

## 2022-04-06 RX ADMIN — GLYCOPYRROLATE 0.2 MG: 0.2 INJECTION, SOLUTION INTRAMUSCULAR; INTRAVENOUS at 11:51

## 2022-04-06 RX ADMIN — Medication 100 MG: at 11:26

## 2022-04-06 RX ADMIN — ACETAMINOPHEN 325MG 975 MG: 325 TABLET ORAL at 17:36

## 2022-04-06 RX ADMIN — SODIUM CHLORIDE, POTASSIUM CHLORIDE, SODIUM LACTATE AND CALCIUM CHLORIDE: 600; 310; 30; 20 INJECTION, SOLUTION INTRAVENOUS at 12:00

## 2022-04-06 RX ADMIN — ROCURONIUM BROMIDE 10 MG: 10 INJECTION INTRAVENOUS at 11:25

## 2022-04-06 RX ADMIN — CEFAZOLIN SODIUM 2 G: 2 INJECTION, SOLUTION INTRAVENOUS at 11:20

## 2022-04-06 RX ADMIN — KETAMINE HYDROCHLORIDE 10 MG: 10 INJECTION, SOLUTION INTRAMUSCULAR; INTRAVENOUS at 11:25

## 2022-04-06 RX ADMIN — LIDOCAINE HYDROCHLORIDE 80 MG: 20 INJECTION, SOLUTION INFILTRATION; PERINEURAL at 11:25

## 2022-04-06 RX ADMIN — PHENAZOPYRIDINE HYDROCHLORIDE 200 MG: 100 TABLET ORAL at 09:34

## 2022-04-06 RX ADMIN — Medication 100 MCG: at 12:28

## 2022-04-06 RX ADMIN — MAGNESIUM SULFATE IN WATER 4 G: 40 INJECTION, SOLUTION INTRAVENOUS at 09:50

## 2022-04-06 RX ADMIN — ROCURONIUM BROMIDE 30 MG: 10 INJECTION INTRAVENOUS at 11:40

## 2022-04-06 RX ADMIN — Medication 100 MCG: at 11:53

## 2022-04-06 RX ADMIN — METRONIDAZOLE 500 MG: 500 INJECTION, SOLUTION INTRAVENOUS at 09:38

## 2022-04-06 RX ADMIN — KETOROLAC TROMETHAMINE 15 MG: 15 INJECTION, SOLUTION INTRAMUSCULAR; INTRAVENOUS at 23:56

## 2022-04-06 RX ADMIN — OXYCODONE HYDROCHLORIDE 5 MG: 5 TABLET ORAL at 15:29

## 2022-04-06 RX ADMIN — PROPOFOL 150 MG: 10 INJECTION, EMULSION INTRAVENOUS at 11:25

## 2022-04-06 RX ADMIN — ALBUTEROL SULFATE 8 PUFF: 90 AEROSOL, METERED RESPIRATORY (INHALATION) at 12:08

## 2022-04-06 RX ADMIN — ACETAMINOPHEN 975 MG: 325 TABLET, FILM COATED ORAL at 09:34

## 2022-04-06 RX ADMIN — ROCURONIUM BROMIDE 5 MG: 10 INJECTION INTRAVENOUS at 12:41

## 2022-04-06 RX ADMIN — FENTANYL CITRATE 50 MCG: 50 INJECTION, SOLUTION INTRAMUSCULAR; INTRAVENOUS at 11:25

## 2022-04-06 RX ADMIN — TRANEXAMIC ACID 1 G: 10 INJECTION, SOLUTION INTRAVENOUS at 11:39

## 2022-04-06 RX ADMIN — KETOROLAC TROMETHAMINE 15 MG: 15 INJECTION, SOLUTION INTRAMUSCULAR; INTRAVENOUS at 16:40

## 2022-04-06 RX ADMIN — ONDANSETRON 4 MG: 2 INJECTION INTRAMUSCULAR; INTRAVENOUS at 11:44

## 2022-04-06 RX ADMIN — PROPOFOL 50 MG: 10 INJECTION, EMULSION INTRAVENOUS at 13:00

## 2022-04-06 ASSESSMENT — LIFESTYLE VARIABLES: TOBACCO_USE: 1

## 2022-04-06 NOTE — PLAN OF CARE
Admitted from surgery at 1430. Alert and oriented; intermittently lethargic but has become more alert as the shift progressed. Afebrile. Apical rate regular; rate controlled. -120s. Lungs clear; O2 sats adequate on room air. Hypoactive bowel sounds; not passing gas at this time. Intermittent nausea with PRN pain medication administration. Jello given per request. Catheter removed at 1630. IVF infusing. Scheduled Toradol and tylenol given alongside PRN oxycodone for abdominal pain. Alarms on for safety; significant other at bedside since admission. Call light remains within reach and makes needs known.     3 trochar sites edges approximated; Open to air with drainage noted.     Face to face report given with opportunity to observe patient.    Report given to CAROL Baugh RN   4/6/2022  6:55 PM       PT note:    Chart reviewed and talked with nursing. Pt is currently being discharged.     Reyes Gallagher, PT

## 2022-04-06 NOTE — CARE PLAN
PTA meds reviewed, no concerns. Will not be speaking with patient unless otherwise requested.    Ioana Carrasco on 4/6/2022 at 2:56 PM     Medication Dispense History (from 4/6/2021 to 4/6/2022)  Expand All  Collapse All    Ergocalciferol     Dispensed Days Supply Quantity Provider Pharmacy   VITAMIN D2 1.25MG(50,000 UNIT) 01/12/2022 84 12 Units [facility]           Lisdexamfetamine Dimesylate     Dispensed Days Supply Quantity Provider Pharmacy   VYVANSE 60 MG CAPSULE 03/15/2022 30 30 Units [facility]   VYVANSE 60 MG CAPSULE 02/16/2022 30 30 Units [facility]   VYVANSE 60 MG CAPSULE 01/17/2022 30 30 Units [facility]   VYVANSE 60 MG CAPSULE 12/23/2021 30 30 Units [facility]   VYVANSE 60 MG CAPSULE 11/26/2021 30 30 Units [facility]   VYVANSE 60 MG CAPSULE 10/27/2021 30 30 Units [facility]   VYVANSE 60 MG CAPSULE 09/29/2021 30 30 Units [facility]   VYVANSE 60 MG CAPSULE 08/30/2021 30 30 Units [facility]   VYVANSE 60 MG CAPSULE 07/27/2021 30 30 Units [facility]   VYVANSE 60 MG CAPSULE 06/30/2021 30 30 Units [facility]   VYVANSE 60 MG CAPSULE 06/01/2021 30 30 Units [facility]   VYVANSE 60 MG CAPSULE 04/30/2021 30 30 Units [facility]...           predniSONE     Dispensed Days Supply Quantity Provider Pharmacy   PREDNISONE 20 MG TABLET 02/14/2022 5 5 Units [facility]           Disclaimer    Certain dispenses may not be available or accurate in this report, including over-the-counter medications, low cost prescriptions, prescriptions paid for by the patient or non-participating sources, or errors in insurance claims  information. The provider should independently verify medication history with the patient.    External Sources

## 2022-04-06 NOTE — ANESTHESIA PROCEDURE NOTES
Airway       Patient location during procedure: OR       Procedure Start/Stop Times: 4/6/2022 11:31 AM  Staff -        CRNA: Frida Thomas APRN CRNA       Performed By: CRNA  Consent for Airway        Urgency: elective  Indications and Patient Condition       Indications for airway management: aren-procedural         Mask difficulty assessment: 1 - vent by mask    Final Airway Details       Final airway type: endotracheal airway       Successful airway: ETT - single  Endotracheal Airway Details        ETT size (mm): 7.0       Cuffed: yes       Successful intubation technique: direct laryngoscopy       DL Blade Type: Melgar 2       Grade View of Cords: 1       Adjucts: stylet       Position: Right       Measured from: gums/teeth       Secured at (cm): 22       Bite block used: None    Post intubation assessment        Placement verified by: capnometry, equal breath sounds and chest rise        Number of attempts at approach: 1       Number of other approaches attempted: 0       Secured with: plastic tape       Ease of procedure: easy       Dentition: Intact

## 2022-04-06 NOTE — INTERVAL H&P NOTE
"I have reviewed the surgical (or preoperative) H&P that is linked to this encounter, and examined the patient. There are no significant changes    Clinical Conditions Present on Arrival:  Clinically Significant Risk Factors Present on Admission                    # Obesity: Estimated body mass index is 31.63 kg/m  as calculated from the following:    Height as of this encounter: 1.664 m (5' 5.5\").    Weight as of this encounter: 87.5 kg (193 lb).       "

## 2022-04-06 NOTE — PLAN OF CARE
[facility] Inpatient Admission Note:    Patient admitted to 3210/3210-1 at approximately 1435 via bed accompanied by transport tech from surgery . Report received from Debbie in SBAR format at 1440 via face to face in room. Patient transferred to bed via self.. Patient is alert and oriented X 3, reports pain; rates at 3 on 0-10 scale.  Patient oriented to room, unit, hourly rounding, and plan of care. Explained admission packet and patient handbook with patient bill of rights brochure. Will continue to monitor and document as needed.     Inpatient Nursing criteria listed below was met:    Health care directives status obtained and documented: Yes    Patient identifies a surrogate decision maker: No If yes, who:     If initial lactic acid greater than 2.0, repeat lactic acid drawn within one hour of arrival to unit: NA. If no, state reason:     Clergy visit ordered if patient requests: N/A    Skin issues/needs documented: Yes    Isolation Patient: no Education given, correct sign in place and documentation row added to PCS:No    Fall Prevention Yes: Care plan updated, education given and documented, sticker and magnet in place: Yes    Care Plan initiated: Yes    Education Documented (including assessment): Yes    Patient has discharge needs : No If yes, please explain:

## 2022-04-06 NOTE — ANESTHESIA CARE TRANSFER NOTE
Patient: Vesna Fabian    Procedure: Procedure(s):  LAPAROSCOPIC ASSISTED VAGINAL HYSTERECTOMY  BILATERAL SALPINGECTOMY  Cystoscopy       Diagnosis: Menorrhagia [N92.0]  Dysmenorrhea [N94.6]  Dyspareunia, female [N94.10]  Diagnosis Additional Information: No value filed.    Anesthesia Type:   General     Note:    Oropharynx: spontaneously breathing and oropharynx clear of all foreign objects  Level of Consciousness: awake  Oxygen Supplementation: face mask  Level of Supplemental Oxygen (L/min / FiO2): 6  Independent Airway: airway patency satisfactory and stable  Dentition: dentition unchanged  Vital Signs Stable: post-procedure vital signs reviewed and stable  Report to RN Given: handoff report given  Patient transferred to: PACU    Handoff Report: Identifed the Patient, Identified the Reponsible Provider, Reviewed the pertinent medical history, Discussed the surgical course, Reviewed Intra-OP anesthesia mangement and issues during anesthesia, Set expectations for post-procedure period and Allowed opportunity for questions and acknowledgement of understanding      Vitals:  Vitals Value Taken Time   BP 79/68 04/06/22 1330   Temp     Pulse 77 04/06/22 1330   Resp 0 04/06/22 1330   SpO2 100 % 04/06/22 1329   Vitals shown include unvalidated device data.    Electronically Signed By: ZAFAR MALDONADO CRNA  April 6, 2022  1:31 PM

## 2022-04-06 NOTE — OP NOTE
[facility]    Pre-operative diagnosis: Menorrhagia [N92.0]  Dysmenorrhea [N94.6]  Dyspareunia, female [N94.10]   Post-operative diagnosis Same, Pathology pending   Procedure: Laparoscopic Assisted Vaginal Hysterectomy.  Bilateral Salpingectomy.  Cystoscopy.     Surgeon:  Assistant: MD Rebecca Cruz NP  (assistance required for retraction, exposure, instrument handling, and wound closure)     Anesthesia: General    Estimated blood loss: Less than 100 ml   Blood transfusion: No transfusion was given during surgery   Drains: Henson catheter   Specimens: Uterus and fallopian tubes.     Findings: Normal pelvic anatomy.  On cystoscopy bladder mucosa was erythematous.  There was no evidence of sutures or perforation and bilateral ureteral jets were noted post procedure.    Complications: None   Condition: Stable         OPERATIVE DICTATION: The patient was brought to the operating room and uneventfully placed under general anesthesia. She was prepped and draped in the dorsal lithotomy position and her bladder drained. The cervix was visualized with a weighted speculum and grasped anteriorly with a fine-tooth tenaculum. The cervix was gently dilated with Thorpe dilators and a uterine manipulating device placed. We then changed gloves and proceeded to the abdominal portion of the case. A 5 mm infraumbilical skin incision was performed with a scalpel. A Veress needle was introduced without difficulty and a water drop test performed. The abdomen was insufflated with several liters of carbon dioxide. A 5 mm trocar and the laparoscope were introduced. Visualization was excellent. Findings were as described above. Next, two lower lateral 5 mm trocars were placed under direct visualization. After initial exploration, the uterus was elevated. The left fallopian tube was elevated and the mesosalpinx was transected with the LigaSure bipolar cutting cautery device. The dissection was continued down through  the utero-ovarian, round and broad ligament complexes. A bladder flap was initiated using sharp and blunt dissection and the uterine artery skeletonized within the cardinal ligament. The uterine artery was cauterized with the LigaSure. The same procedure was repeated on the patient's right side and the bladder flap was completed in the midline. At this point, the pelvis was checked and found to be hemostatic. We then proceeded to the vaginal portion of the case. Excess carbon dioxide was expressed from the abdomen but the trocars left in place. The patient was placed in the high lithotomy position. The uterine manipulating device was removed and the cervix visualized with a weighted speculum. The cervix was grasped with two fine-toothed tenaculum. A circumferential cervical incision was performed with a scalpel. Posterior colpotomy was performed sharply without difficulty. The uterosacral ligaments were clamped, cut and suture ligated with 0 Vicryl. The bladder was then dissected anteriorly off the lower uterine segment and cervix and anterior colpotomy performed. A Elmwood retractor was placed to displace the bladder superiorly. The remaining cardinal ligament complexes were clamped, cut and suture ligated with 0 Vicryl. The uterus was removed. A pack was placed to displace the bowel out of the pelvis. An external Ascencio suture was performed in the usual fashion using 0 Vicryl and plicating the uterosacral ligaments high in the pelvis. The Ascencio's suture was tagged. A reperitonealizing stitch of 0 Vicryl was placed in a pursestring fashion incorporating the uterosacral ligament into the closure. The packing was removed and the pursestring tied. The vaginal cuff was closed with interrupted 2-0 Vicryl sutures with the uterosacral ligament complexes being incorporated it into the vaginal angle sutures. The cuff was irrigated and checked for hemostasis. The Ascencio's suture was tied with resulting excellent apical  vaginal support. Cystoscopy was then performed using a 70-degree rigid cystoscope with normal saline as distention media. Visualization was excellent. Bilateral ureteral jets were noted. There is no evidence of bladder perforation or suture. The cystoscope was withdrawn and a Henson catheter placed. We then changed gloves and re-proceeded with laparoscopy. The abdomen was reinsufflated with carbon dioxide. The pelvis was irrigated and found to be hemostatic. The trocars were then removed under direct visualization and we proceeded to closure. The subcutaneous spaces were irrigated and checked for hemostasis. The skin incisions were closed with surgical glue. There were no complications and the patient was transferred to the recovery room in excellent stable condition.   Juan Alberto Cain MD  4/6/2022  11:05 AM

## 2022-04-06 NOTE — ANESTHESIA POSTPROCEDURE EVALUATION
Patient: Vesna Fabian    Procedure: Procedure(s):  LAPAROSCOPIC ASSISTED VAGINAL HYSTERECTOMY  BILATERAL SALPINGECTOMY  Cystoscopy       Anesthesia Type:  General    Note:  Disposition: Inpatient   Postop Pain Control: Uneventful            Sign Out: Well controlled pain   PONV: No   Neuro/Psych: Uneventful            Sign Out: Acceptable/Baseline neuro status   Airway/Respiratory: Uneventful            Sign Out: Acceptable/Baseline resp. status   CV/Hemodynamics: Uneventful            Sign Out: Acceptable CV status; No obvious hypovolemia; No obvious fluid overload   Other NRE: NONE   DID A NON-ROUTINE EVENT OCCUR? No           Last vitals:  Vitals Value Taken Time   /78 04/06/22 1425   Temp 97.9  F (36.6  C) 04/06/22 1415   Pulse 67 04/06/22 1425   Resp 15 04/06/22 1426   SpO2 99 % 04/06/22 1426   Vitals shown include unvalidated device data.    Electronically Signed By: ZAFAR Mazariegos CRNA  April 6, 2022  3:07 PM

## 2022-04-07 VITALS
OXYGEN SATURATION: 96 % | BODY MASS INDEX: 31.02 KG/M2 | TEMPERATURE: 98.1 F | HEART RATE: 76 BPM | SYSTOLIC BLOOD PRESSURE: 134 MMHG | HEIGHT: 66 IN | RESPIRATION RATE: 16 BRPM | DIASTOLIC BLOOD PRESSURE: 83 MMHG | WEIGHT: 193 LBS

## 2022-04-07 LAB
ERYTHROCYTE [DISTWIDTH] IN BLOOD BY AUTOMATED COUNT: 12.5 % (ref 10–15)
HCT VFR BLD AUTO: 40 % (ref 35–47)
HGB BLD-MCNC: 13.5 G/DL (ref 11.7–15.7)
HOLD SPECIMEN: NORMAL
MCH RBC QN AUTO: 29.7 PG (ref 26.5–33)
MCHC RBC AUTO-ENTMCNC: 33.8 G/DL (ref 31.5–36.5)
MCV RBC AUTO: 88 FL (ref 78–100)
PLATELET # BLD AUTO: 392 10E3/UL (ref 150–450)
RBC # BLD AUTO: 4.55 10E6/UL (ref 3.8–5.2)
WBC # BLD AUTO: 14.7 10E3/UL (ref 4–11)

## 2022-04-07 PROCEDURE — 250N000011 HC RX IP 250 OP 636: Performed by: OBSTETRICS & GYNECOLOGY

## 2022-04-07 PROCEDURE — 999N000157 HC STATISTIC RCP TIME EA 10 MIN

## 2022-04-07 PROCEDURE — 36415 COLL VENOUS BLD VENIPUNCTURE: CPT | Performed by: NURSE PRACTITIONER

## 2022-04-07 PROCEDURE — 85027 COMPLETE CBC AUTOMATED: CPT | Performed by: NURSE PRACTITIONER

## 2022-04-07 PROCEDURE — 250N000013 HC RX MED GY IP 250 OP 250 PS 637: Performed by: OBSTETRICS & GYNECOLOGY

## 2022-04-07 RX ORDER — ACETAMINOPHEN 325 MG/1
650 TABLET ORAL EVERY 8 HOURS PRN
COMMUNITY
Start: 2022-04-07 | End: 2023-02-13 | Stop reason: ALTCHOICE

## 2022-04-07 RX ORDER — OXYCODONE HYDROCHLORIDE 5 MG/1
5 TABLET ORAL EVERY 4 HOURS PRN
Qty: 8 TABLET | Refills: 0 | Status: SHIPPED | OUTPATIENT
Start: 2022-04-07 | End: 2022-04-08

## 2022-04-07 RX ORDER — IBUPROFEN 800 MG/1
800 TABLET, FILM COATED ORAL EVERY 8 HOURS PRN
Qty: 60 TABLET | Refills: 0 | Status: SHIPPED | OUTPATIENT
Start: 2022-04-07 | End: 2023-02-13 | Stop reason: ALTCHOICE

## 2022-04-07 RX ADMIN — OXYCODONE HYDROCHLORIDE 10 MG: 5 TABLET ORAL at 09:48

## 2022-04-07 RX ADMIN — OXYCODONE HYDROCHLORIDE 5 MG: 5 TABLET ORAL at 05:44

## 2022-04-07 RX ADMIN — IBUPROFEN 800 MG: 200 TABLET, FILM COATED ORAL at 11:20

## 2022-04-07 RX ADMIN — SENNOSIDES AND DOCUSATE SODIUM 1 TABLET: 50; 8.6 TABLET ORAL at 08:04

## 2022-04-07 RX ADMIN — KETOROLAC TROMETHAMINE 15 MG: 15 INJECTION, SOLUTION INTRAMUSCULAR; INTRAVENOUS at 05:43

## 2022-04-07 RX ADMIN — ACETAMINOPHEN 325MG 975 MG: 325 TABLET ORAL at 05:44

## 2022-04-07 RX ADMIN — ACETAMINOPHEN 325MG 975 MG: 325 TABLET ORAL at 11:21

## 2022-04-07 NOTE — PROGRESS NOTES
"[facility]  Daily Post-Op Note         Assessment and Plan:    Assessment:   Post-operative day #1  Procedure(s):  LAPAROSCOPIC ASSISTED VAGINAL HYSTERECTOMY  BILATERAL SALPINGECTOMY  Cystoscopy     Doing well.  Clean wound without signs of infection.  Normal healing wound.  Low grade fever this morning  No excessive bleeding  Pain well-controlled.      Plan:   Ambulate  Advance activity as tolerated  Continue supportive and symptomatic treatment  Pain control measures  CBC            Interval History:   Stable.  Doing well.  Improving slowly.  Pain is reasonably controlled.   Fever 100.3 smoker.  Ambulating.  Voiding.            Review of Systems:    CONSTITUTIONAL:POSITIVE  for fever 100.3  R: NEGATIVE for significant cough or SOB  CV: NEGATIVE for chest pain, palpitations or peripheral edema  GI: NEGATIVE for nausea and vomiting  : negative for bleeding             Medications:   I have reviewed this patient's current medications          Physical Exam:   Vitals were reviewed  /79 (BP Location: Left arm, Cuff Size: Adult Regular)   Pulse 77   Temp 100.3  F (37.9  C) (Tympanic)   Resp 16   Ht 1.664 m (5' 5.5\")   Wt 87.5 kg (193 lb)   SpO2 96%   BMI 31.63 kg/m    Lungs clear  C-RRR  Hypo BS  Wound clean and dry with minimal or no drainage.  Surrounding skin with minimal erythema.           Data:   All laboratory data related to this surgery reviewed    "

## 2022-04-07 NOTE — PLAN OF CARE
"Patient is alert and oriented, makes needs known. Up to bathroom with standby assist, voiding w/o difficulty but some blood noted in urine. Pain controlled with PRN Oxycodone and scheduled Tylenol and Toradol. Assessment as charted. /69 (BP Location: Left arm, Cuff Size: Adult Regular)   Pulse 96   Temp 99.9  F (37.7  C)   Resp 16   Ht 1.664 m (5' 5.5\")   Wt 87.5 kg (193 lb)   SpO2 95%   BMI 31.63 kg/m      Face to face report given with opportunity to observe patient.    Report given to CAROL Coon RN   4/7/2022  7:08 AM      "

## 2022-04-07 NOTE — DISCHARGE SUMMARY
"Discharge Summary    Vesna Fabian MRN# [id number]   YOB: 1985 Age: 36 year old     Date of Admission:  4/6/2022  Date of Discharge:  4/7/2022  Admitting Physician:  Juan Alberto Cain MD  Discharge Physician:  Rebecca Warner NP  Discharging Service:  Obstetrics and Gynecology     Primary Provider: Dixie Chun          Admission Diagnoses:   Menorrhagia [N92.0]  Dysmenorrhea [N94.6]  Dyspareunia, female [N94.10]  S/P hysterectomy [Z90.710]          Discharge Diagnosis:   Patient Active Problem List   Diagnosis     Family planning     Moderate major depression (H)     Anxiety     Chronic fatigue     Encounter for routine gynecological examination     Decreased libido     Fatigue     Smoker     ASCUS (atypical squamous cells of undetermined significance) on Pap smear     NO SHOW     ACP (advance care planning)     Ligament tear     Decreased ferritin     Bipolar disorder, unspecified (H)     S/P hysterectomy                Discharge Disposition:   Discharged to home           Condition on Discharge:   Discharge condition: Stable   Discharge vitals: Blood pressure 134/83, pulse 76, temperature 98.1  F (36.7  C), temperature source Tympanic, resp. rate 16, height 1.664 m (5' 5.5\"), weight 87.5 kg (193 lb), SpO2 96 %.   Code status on discharge: Full Code           Procedures / Labs / Imaging:   Invasive procedures: LAVH, cystoscopy, freeman          Medications Prior to Admission:     Medications Prior to Admission   Medication Sig Dispense Refill Last Dose     lisdexamfetamine (VYVANSE) 60 MG capsule Take 1 capsule (60 mg) by mouth daily 30 capsule 0 Past Week at Unknown time     vitamin D2 (ERGOCALCIFEROL) 66766 units (1250 mcg) capsule Take 1 capsule (50,000 Units) by mouth once a week 12 capsule 3 Past Week at Unknown time             Discharge Medications:     Current Discharge Medication List      START taking these medications    Details   acetaminophen (TYLENOL) 325 MG tablet Take 2 tablets (650 mg) " by mouth every 8 hours as needed for mild pain    Associated Diagnoses: S/P hysterectomy      ibuprofen (ADVIL/MOTRIN) 800 MG tablet Take 1 tablet (800 mg) by mouth every 8 hours as needed for mild pain  Qty: 60 tablet, Refills: 0    Associated Diagnoses: S/P hysterectomy      oxyCODONE (ROXICODONE) 5 MG tablet Take 1 tablet (5 mg) by mouth every 4 hours as needed for severe pain  Qty: 8 tablet, Refills: 0    Associated Diagnoses: S/P hysterectomy         CONTINUE these medications which have NOT CHANGED    Details   lisdexamfetamine (VYVANSE) 60 MG capsule Take 1 capsule (60 mg) by mouth daily  Qty: 30 capsule, Refills: 0    Associated Diagnoses: ADHD (attention deficit hyperactivity disorder), combined type      vitamin D2 (ERGOCALCIFEROL) 63892 units (1250 mcg) capsule Take 1 capsule (50,000 Units) by mouth once a week  Qty: 12 capsule, Refills: 3    Associated Diagnoses: Vitamin D deficiency                   Consultations:   No consultations were requested during this admission             Brief History of Illness:   Vesna Fabian is a 36 year old female who was admitted for LAVH and salpingectomy due to long standing dysmenorrhea, menorrhagia. Uncomplicated surgical and post op course.  Discharged to home on PO day #1          Hospital Course:     S/P hysterectomy    * No resolved hospital problems. *                 Significant Results:   None             Pending Results:   None           Discharge Instructions and Follow-Up:   Discharge diet: Regular   Discharge activity: No lifting, driving, or strenuous exercise for 4-6 week(s)  No driving or operating machinery while on narcotic analgesics  Pelvic rest: abstain from intercourse and do not use tampons for 6 week(s)   Discharge follow-up: Follow up with Dr Cain in 4 weeks   Wound care: None   Other instructions: Monitor for any signs of infection, including fever, chills, or increasing pain.

## 2022-04-07 NOTE — DISCHARGE INSTRUCTIONS
No heavy lifting greater than 15lb for 4 weeks  No driving while on pain meds  Pelvic rest for 6 weeks  No vigorous activity, exercise, swim, bath for 4 weeks  Schedule Postop check Dr. Cain 4 weeks  Call Dr. Cain [phone] as necessary if problems in interim

## 2022-04-07 NOTE — PLAN OF CARE
Patient discharged at 12:40 PM via ambulation accompanied by significant other and staff. Prescriptions sent to patients preferred pharmacy. All belongings sent with patient.     Discharge instructions reviewed with patient. Listed belongings gathered and returned to patient.     Patient discharged to home.       Surgical Patient   Surgical Procedures during stay: hysterectomy   Did patient receive discharge instruction on wound care and recognition of infection symptoms? Yes    MISC  Follow up appointment made:  Yes  Home medications returned to patient: N/A  Patient reports pain was well managed at discharge: Yes

## 2022-04-08 ENCOUNTER — TELEPHONE (OUTPATIENT)
Dept: OBGYN | Facility: OTHER | Age: 37
End: 2022-04-08
Payer: COMMERCIAL

## 2022-04-08 DIAGNOSIS — Z90.710 S/P HYSTERECTOMY: ICD-10-CM

## 2022-04-08 RX ORDER — OXYCODONE HYDROCHLORIDE 5 MG/1
5 TABLET ORAL EVERY 4 HOURS PRN
Qty: 15 TABLET | Refills: 0 | Status: SHIPPED | OUTPATIENT
Start: 2022-04-08 | End: 2022-09-29

## 2022-04-08 NOTE — TELEPHONE ENCOUNTER
Pt had hysterectomy on 4/6/22 was given 8 oxycodone and would like to know if she can get a few more for pain. I was unable to reach her by phone. Please advise.

## 2022-04-12 LAB
PATH REPORT.COMMENTS IMP SPEC: NORMAL
PATH REPORT.COMMENTS IMP SPEC: NORMAL
PATH REPORT.FINAL DX SPEC: NORMAL
PATH REPORT.GROSS SPEC: NORMAL
PATH REPORT.MICROSCOPIC SPEC OTHER STN: NORMAL
PATH REPORT.RELEVANT HX SPEC: NORMAL
PHOTO IMAGE: NORMAL

## 2022-04-15 ENCOUNTER — MYC REFILL (OUTPATIENT)
Dept: FAMILY MEDICINE | Facility: OTHER | Age: 37
End: 2022-04-15
Payer: COMMERCIAL

## 2022-04-15 DIAGNOSIS — F90.2 ADHD (ATTENTION DEFICIT HYPERACTIVITY DISORDER), COMBINED TYPE: ICD-10-CM

## 2022-04-18 NOTE — TELEPHONE ENCOUNTER
Yana      Last Written Prescription Date:  3/15/22  Last Fill Quantity: 30,   # refills: 0  Last Office Visit: 3/25/22  Future Office visit:       Routing refill request to provider for review/approval because:

## 2022-04-19 ENCOUNTER — MYC REFILL (OUTPATIENT)
Dept: FAMILY MEDICINE | Facility: OTHER | Age: 37
End: 2022-04-19
Payer: COMMERCIAL

## 2022-04-19 DIAGNOSIS — F90.2 ADHD (ATTENTION DEFICIT HYPERACTIVITY DISORDER), COMBINED TYPE: ICD-10-CM

## 2022-04-19 RX ORDER — LISDEXAMFETAMINE DIMESYLATE 60 MG/1
60 CAPSULE ORAL DAILY
Qty: 30 CAPSULE | Refills: 0 | Status: SHIPPED | OUTPATIENT
Start: 2022-04-19 | End: 2022-05-16

## 2022-04-19 RX ORDER — LISDEXAMFETAMINE DIMESYLATE 60 MG/1
60 CAPSULE ORAL DAILY
Qty: 30 CAPSULE | Refills: 0 | Status: SHIPPED | OUTPATIENT
Start: 2022-04-19 | End: 2022-05-05

## 2022-04-19 NOTE — TELEPHONE ENCOUNTER
Yana      Last Written Prescription Date:  3/15/22  Last Fill Quantity: 30,   # refills: 0  Last Office Visit: 3/25/22  Future Office visit:

## 2022-05-05 ENCOUNTER — OFFICE VISIT (OUTPATIENT)
Dept: OBGYN | Facility: OTHER | Age: 37
End: 2022-05-05
Attending: OBSTETRICS & GYNECOLOGY
Payer: COMMERCIAL

## 2022-05-05 VITALS
WEIGHT: 186 LBS | OXYGEN SATURATION: 99 % | SYSTOLIC BLOOD PRESSURE: 110 MMHG | BODY MASS INDEX: 30.99 KG/M2 | DIASTOLIC BLOOD PRESSURE: 70 MMHG | TEMPERATURE: 96.9 F | HEIGHT: 65 IN | HEART RATE: 101 BPM

## 2022-05-05 DIAGNOSIS — Z90.710 S/P HYSTERECTOMY: Primary | ICD-10-CM

## 2022-05-05 DIAGNOSIS — Z98.890 POST-OPERATIVE STATE: ICD-10-CM

## 2022-05-05 PROCEDURE — 99024 POSTOP FOLLOW-UP VISIT: CPT | Performed by: OBSTETRICS & GYNECOLOGY

## 2022-05-05 ASSESSMENT — PAIN SCALES - GENERAL: PAINLEVEL: NO PAIN (0)

## 2022-05-05 NOTE — NURSING NOTE
"Chief Complaint   Patient presents with     Surgical Followup     LAVH, Bilateral Salphgectomy 4/6/22.  Pt states she started having bright red bleeding today.       Initial /70 (BP Location: Left arm, Patient Position: Chair, Cuff Size: Adult Regular)   Pulse 101   Temp 96.9  F (36.1  C) (Tympanic)   Ht 1.651 m (5' 5\")   Wt 84.4 kg (186 lb)   SpO2 99%   BMI 30.95 kg/m   Estimated body mass index is 30.95 kg/m  as calculated from the following:    Height as of this encounter: 1.651 m (5' 5\").    Weight as of this encounter: 84.4 kg (186 lb).  Medication Reconciliation: complete  SHARA SILVERMAN LPN    "

## 2022-05-05 NOTE — PROGRESS NOTES
"CALEB Fabian is a 36 year old female presents for post operative check. She is  4  week(s) status post Trans vaginal hysterectomy (LAVH).  She reports doing well and denies significant pain or bleeding.  Bowel and bladder function is satisfactory.  Unfortunately got influenza the week after surgery and has had severe coughing which has now resolved.  Has has some vaginal bleeding last two days and mild stress ui with coughing.  No dysuria.  Denies incisional problems. Significant findings:  Pathology  Benign    O.  Blood pressure 110/70, pulse 101, temperature 96.9  F (36.1  C), temperature source Tympanic, height 1.651 m (5' 5\"), weight 84.4 kg (186 lb), SpO2 99 %.    Abd: soft, non-tender, non-distended. Incisions clear, dry, and intact without evidence of infection.  Vagina well supported.  Suture lines in-tact with small amt of bleeding coming from vaginal cuff treated with silver nitrate.     A. /P. Satisfactory post-op check.Released from restrictions 6 wks PO.    F/u prn problems, continued bleeding, or at next annual examination.    Juan Alberto Cain MD  "

## 2022-05-14 NOTE — OR NURSING
Birgit Diamond  5/14/2022  [id number]    Chief Complaint: Acute CVA (cerebrovascular accident) [facility])    Subjective:   No acute events overnight. Today Jef Yung is seen in her room. She is happy that she was able to visit with her dog Laureen yesterday. She reports pain from hemorrhoids, but states that cream is helping. She otherwise denies acute complaints. ROS: denies f/c, n/v, cp, sob    Objective:  Patient Vitals for the past 24 hrs:   BP Temp Temp src Pulse Resp SpO2   05/14/22 1021 (!) 190/77 -- -- 63 -- 95 %   05/14/22 0815 (!) 181/78 97.1 °F (36.2 °C) Oral 59 16 93 %   05/13/22 2115 (!) 192/64 98.5 °F (36.9 °C) Oral 65 16 93 %   05/13/22 1518 (!) 196/75 -- -- 61 -- 93 %   05/13/22 1344 (!) 193/78 -- -- 60 -- 92 %     Gen: No distress, pleasant. HEENT: Normocephalic, atraumatic. CV: extremities well perfused  Resp: No respiratory distress. On room air   Abd: Soft, nondistended  Ext: No edema. Neuro: Alert, oriented, appropriately interactive. Left hemiparesis.     Wt Readings from Last 3 Encounters:   05/10/22 150 lb 12.7 oz (68.4 kg)   05/02/22 143 lb 9.6 oz (65.1 kg)   09/09/21 150 lb 12.8 oz (68.4 kg)       Laboratory data:   Lab Results   Component Value Date    WBC 7.3 05/13/2022    HGB 10.3 (L) 05/13/2022    HCT 30.3 (L) 05/13/2022    MCV 94.2 05/13/2022     05/13/2022       Lab Results   Component Value Date     05/13/2022    K 3.8 05/13/2022     05/13/2022    CO2 25 05/13/2022    BUN 23 05/13/2022    CREATININE 1.1 05/13/2022    GLUCOSE 107 05/13/2022    GLUCOSE 129 07/26/2017    CALCIUM 8.8 05/13/2022        Therapy progress:  PT  Position Activity Restriction  Other position/activity restrictions: Contact precautions--purewick, telemetry, 2 L O 2  Objective     Sit to Stand: 2 Person Assistance,Dependent/Total  Stand to sit: Dependent/Total,2 Person Assistance  Bed to Chair: Dependent/Total     OT  PT Equipment Recommendations  Equipment Needed: Yes  Toilet - Technique: Patient and responsible adult given discharge instructions with no questions regarding instructions. Heather score 20. Pain level 0/10.  Discharged from unit via home. Patient discharged to home.     (steady to e-tac chair over toilet)  Equipment Used:  (e-tac chair)  Toilet Transfers Comments: maxA x2 sit <>  steady, progressing at times to [address] X2 with max verbal/tactile and demo cues  Assessment        SLP                Body mass index is 27.14 kg/m². Assessment and Plan:  John Hearn is a 66year old female with a past medical history significant for recent CVA with left hemiparesis, CML, HTN, and HLD who presented to [facility] on 5/3/22 with abnormal labs, admitted with rhabdomyolysis and ARF, found to have acute CVA. She was admitted to [facility] on 5/10/22 due to functional deficits below her baseline.     Acute Right CVA  - MRI showing acute infarct in right cerebral hemisphere in a watershed distribution  - CTA showing 90-95% stenosis of right ICA  - Vascular and Neurology in agreement on waiting for CEA, needs follow up in 4 weeks  - asa, plavix, statin  - PT, OT, ST     Dysphagia  - on modified diet  - ST    RLL Pneumonia  - concern for aspiration in setting of dysphagia  - levaquin   - IS      Rhabdomyolysis  Acute Renal Injury  - Nephrology followed during acute stay  - CPK trending down  - monitor      HTN  - patient with episode of hypotension while in acute care so isosorbid mononitrate and hydralazine held  - metoprolol 25 mg   - increased lisinopril to 40 mg daily for 5/14  - want to avoid hypotension    DM2  - Hba1c 6.6  - SSI     Liver Injury with elevated LFTs  - suspected to be due to statin induced rhabdo  - statin stopped  - LFTs trending down  - monitor intermittently     CML  - follows with Dr. Fide Rhodes  - on nilitinib at home, can consider resuming as able  - follow up with Heme/Onc OP     Enlarged heterogeneous appearance of the thyroid  - Thyroid ultrasound outpatient  - follow up with PCP     Hemorrhoids  - hydrocortisone cream   - bowel regimen to avoid constipation     Bowels: Schedule stool softener. Follow bowel movements. Enema or suppository if needed.    Bladder: Check PVR x 3. [facility] if PVR > 200ml or if any volume is > 500 ml.      Sleep: Trazodone provided prn.      Follow up appointments: Vascular, PCP    [facility].  Carissa Lopez MD 5/14/2022, 11:39 AM

## 2022-05-16 ENCOUNTER — MYC REFILL (OUTPATIENT)
Dept: FAMILY MEDICINE | Facility: OTHER | Age: 37
End: 2022-05-16
Payer: COMMERCIAL

## 2022-05-16 DIAGNOSIS — F90.2 ADHD (ATTENTION DEFICIT HYPERACTIVITY DISORDER), COMBINED TYPE: ICD-10-CM

## 2022-05-16 RX ORDER — LISDEXAMFETAMINE DIMESYLATE 60 MG/1
60 CAPSULE ORAL DAILY
Qty: 30 CAPSULE | Refills: 0 | Status: SHIPPED | OUTPATIENT
Start: 2022-05-16 | End: 2022-06-09

## 2022-05-16 NOTE — TELEPHONE ENCOUNTER
Yana      Last Written Prescription Date:  4.19.22  Last Fill Quantity: #30,   # refills: 0  Last Office Visit: 3.25.22  Future Office visit:       Routing refill request to provider for review/approval because:  Drug not on the [facility], P or [facility] refill protocol or controlled substance

## 2022-06-09 ENCOUNTER — MYC REFILL (OUTPATIENT)
Dept: FAMILY MEDICINE | Facility: OTHER | Age: 37
End: 2022-06-09
Payer: COMMERCIAL

## 2022-06-09 DIAGNOSIS — F90.2 ADHD (ATTENTION DEFICIT HYPERACTIVITY DISORDER), COMBINED TYPE: ICD-10-CM

## 2022-06-09 RX ORDER — LISDEXAMFETAMINE DIMESYLATE 60 MG/1
60 CAPSULE ORAL DAILY
Qty: 30 CAPSULE | Refills: 0 | Status: SHIPPED | OUTPATIENT
Start: 2022-06-09 | End: 2022-07-06

## 2022-06-09 NOTE — TELEPHONE ENCOUNTER
Lisdexamfetamine (Vyvanse) 60 mg capsule  Take 1 capsule (60 mg) by mouth daily     Last Written Prescription Date:  5-  Last Fill Quantity: 30 capsule,   # refills: 0  Last Office Visit: 3-  Future Office visit:

## 2022-07-06 ENCOUNTER — MYC REFILL (OUTPATIENT)
Dept: FAMILY MEDICINE | Facility: OTHER | Age: 37
End: 2022-07-06

## 2022-07-06 DIAGNOSIS — F90.2 ADHD (ATTENTION DEFICIT HYPERACTIVITY DISORDER), COMBINED TYPE: ICD-10-CM

## 2022-07-08 RX ORDER — LISDEXAMFETAMINE DIMESYLATE 60 MG/1
60 CAPSULE ORAL DAILY
Qty: 30 CAPSULE | Refills: 0 | Status: SHIPPED | OUTPATIENT
Start: 2022-07-08 | End: 2022-08-01

## 2022-07-08 NOTE — TELEPHONE ENCOUNTER
Yana      Last Written Prescription Date:  6/9/22  Last Fill Quantity: 30,   # refills: 0  Last Office Visit: 3/25/22  Future Office visit:       Routing refill request to provider for review/approval because:

## 2022-08-01 ENCOUNTER — MYC REFILL (OUTPATIENT)
Dept: FAMILY MEDICINE | Facility: OTHER | Age: 37
End: 2022-08-01

## 2022-08-01 DIAGNOSIS — F90.2 ADHD (ATTENTION DEFICIT HYPERACTIVITY DISORDER), COMBINED TYPE: ICD-10-CM

## 2022-08-03 RX ORDER — LISDEXAMFETAMINE DIMESYLATE 60 MG/1
60 CAPSULE ORAL DAILY
Qty: 30 CAPSULE | Refills: 0 | Status: SHIPPED | OUTPATIENT
Start: 2022-08-03 | End: 2022-08-29

## 2022-08-03 NOTE — TELEPHONE ENCOUNTER
Kay      Last Written Prescription Date:  7/8/22  Last Fill Quantity: 30,   # refills: 0  Last Office Visit: 3/25/22  Future Office visit:       Routing refill request to provider for review/approval because:

## 2022-08-29 ENCOUNTER — MYC REFILL (OUTPATIENT)
Dept: FAMILY MEDICINE | Facility: OTHER | Age: 37
End: 2022-08-29

## 2022-08-29 DIAGNOSIS — F90.2 ADHD (ATTENTION DEFICIT HYPERACTIVITY DISORDER), COMBINED TYPE: ICD-10-CM

## 2022-08-30 RX ORDER — LISDEXAMFETAMINE DIMESYLATE 60 MG/1
60 CAPSULE ORAL DAILY
Qty: 30 CAPSULE | Refills: 0 | Status: SHIPPED | OUTPATIENT
Start: 2022-08-30 | End: 2022-09-28

## 2022-08-30 NOTE — TELEPHONE ENCOUNTER
lisdexamfetamine (VYVANSE) 60 MG capsule      Last Written Prescription Date:  8/3/22  Last Fill Quantity: 30,   # refills: 0  Last Office Visit: 3/25/22  Future Office visit:       Routing refill request to provider for review/approval because:  Drug not on the [facility], [facility] or [facility] refill protocol or controlled substance

## 2022-09-04 ENCOUNTER — HEALTH MAINTENANCE LETTER (OUTPATIENT)
Age: 37
End: 2022-09-04

## 2022-09-28 ENCOUNTER — MYC REFILL (OUTPATIENT)
Dept: FAMILY MEDICINE | Facility: OTHER | Age: 37
End: 2022-09-28

## 2022-09-28 DIAGNOSIS — F90.2 ADHD (ATTENTION DEFICIT HYPERACTIVITY DISORDER), COMBINED TYPE: ICD-10-CM

## 2022-09-29 ENCOUNTER — HOSPITAL ENCOUNTER (EMERGENCY)
Facility: HOSPITAL | Age: 37
Discharge: HOME | End: 2022-09-29
Attending: STUDENT IN AN ORGANIZED HEALTH CARE EDUCATION/TRAINING PROGRAM | Admitting: STUDENT IN AN ORGANIZED HEALTH CARE EDUCATION/TRAINING PROGRAM
Payer: COMMERCIAL

## 2022-09-29 VITALS
TEMPERATURE: 99 F | DIASTOLIC BLOOD PRESSURE: 95 MMHG | HEART RATE: 62 BPM | SYSTOLIC BLOOD PRESSURE: 141 MMHG | RESPIRATION RATE: 16 BRPM | OXYGEN SATURATION: 97 %

## 2022-09-29 DIAGNOSIS — M54.50 ACUTE BILATERAL LOW BACK PAIN WITHOUT SCIATICA: ICD-10-CM

## 2022-09-29 LAB
ALBUMIN SERPL-MCNC: 4.2 G/DL (ref 3.4–5)
ALBUMIN UR-MCNC: NEGATIVE MG/DL
ALP SERPL-CCNC: 84 U/L (ref 40–150)
ALT SERPL W P-5'-P-CCNC: 38 U/L (ref 0–50)
ANION GAP SERPL CALCULATED.3IONS-SCNC: 4 MMOL/L (ref 3–14)
APPEARANCE UR: CLEAR
AST SERPL W P-5'-P-CCNC: 23 U/L (ref 0–45)
BACTERIA #/AREA URNS HPF: ABNORMAL /HPF
BILIRUB DIRECT SERPL-MCNC: 0.1 MG/DL (ref 0–0.2)
BILIRUB SERPL-MCNC: 0.5 MG/DL (ref 0.2–1.3)
BILIRUB UR QL STRIP: NEGATIVE
BUN SERPL-MCNC: 11 MG/DL (ref 7–30)
CALCIUM SERPL-MCNC: 8.8 MG/DL (ref 8.5–10.1)
CHLORIDE BLD-SCNC: 105 MMOL/L (ref 94–109)
CO2 SERPL-SCNC: 27 MMOL/L (ref 20–32)
COLOR UR AUTO: ABNORMAL
CREAT SERPL-MCNC: 0.69 MG/DL (ref 0.52–1.04)
ERYTHROCYTE [DISTWIDTH] IN BLOOD BY AUTOMATED COUNT: 13.4 % (ref 10–15)
GFR SERPL CREATININE-BSD FRML MDRD: >90 ML/MIN/1.73M2
GLUCOSE BLD-MCNC: 84 MG/DL (ref 70–99)
GLUCOSE UR STRIP-MCNC: NEGATIVE MG/DL
HCG UR QL: NEGATIVE
HCT VFR BLD AUTO: 47.3 % (ref 35–47)
HGB BLD-MCNC: 15.8 G/DL (ref 11.7–15.7)
HGB UR QL STRIP: NEGATIVE
KETONES UR STRIP-MCNC: NEGATIVE MG/DL
LEUKOCYTE ESTERASE UR QL STRIP: NEGATIVE
LIPASE SERPL-CCNC: 56 U/L (ref 73–393)
MCH RBC QN AUTO: 29.4 PG (ref 26.5–33)
MCHC RBC AUTO-ENTMCNC: 33.4 G/DL (ref 31.5–36.5)
MCV RBC AUTO: 88 FL (ref 78–100)
MUCOUS THREADS #/AREA URNS LPF: PRESENT /LPF
NITRATE UR QL: NEGATIVE
PH UR STRIP: 7.5 [PH] (ref 4.7–8)
PLATELET # BLD AUTO: 356 10E3/UL (ref 150–450)
POTASSIUM BLD-SCNC: 4 MMOL/L (ref 3.4–5.3)
PROT SERPL-MCNC: 8 G/DL (ref 6.8–8.8)
RBC # BLD AUTO: 5.37 10E6/UL (ref 3.8–5.2)
RBC URINE: 3 /HPF
SODIUM SERPL-SCNC: 136 MMOL/L (ref 133–144)
SP GR UR STRIP: 1.01 (ref 1–1.03)
SQUAMOUS EPITHELIAL: 5 /HPF
UROBILINOGEN UR STRIP-MCNC: NORMAL MG/DL
WBC # BLD AUTO: 8.1 10E3/UL (ref 4–11)
WBC URINE: <1 /HPF

## 2022-09-29 PROCEDURE — 82248 BILIRUBIN DIRECT: CPT | Performed by: STUDENT IN AN ORGANIZED HEALTH CARE EDUCATION/TRAINING PROGRAM

## 2022-09-29 PROCEDURE — 80053 COMPREHEN METABOLIC PANEL: CPT | Performed by: STUDENT IN AN ORGANIZED HEALTH CARE EDUCATION/TRAINING PROGRAM

## 2022-09-29 PROCEDURE — 81025 URINE PREGNANCY TEST: CPT | Performed by: STUDENT IN AN ORGANIZED HEALTH CARE EDUCATION/TRAINING PROGRAM

## 2022-09-29 PROCEDURE — 250N000011 HC RX IP 250 OP 636: Performed by: STUDENT IN AN ORGANIZED HEALTH CARE EDUCATION/TRAINING PROGRAM

## 2022-09-29 PROCEDURE — 250N000013 HC RX MED GY IP 250 OP 250 PS 637: Performed by: STUDENT IN AN ORGANIZED HEALTH CARE EDUCATION/TRAINING PROGRAM

## 2022-09-29 PROCEDURE — 96372 THER/PROPH/DIAG INJ SC/IM: CPT | Performed by: STUDENT IN AN ORGANIZED HEALTH CARE EDUCATION/TRAINING PROGRAM

## 2022-09-29 PROCEDURE — 36415 COLL VENOUS BLD VENIPUNCTURE: CPT | Performed by: STUDENT IN AN ORGANIZED HEALTH CARE EDUCATION/TRAINING PROGRAM

## 2022-09-29 PROCEDURE — 83690 ASSAY OF LIPASE: CPT | Performed by: STUDENT IN AN ORGANIZED HEALTH CARE EDUCATION/TRAINING PROGRAM

## 2022-09-29 PROCEDURE — 81001 URINALYSIS AUTO W/SCOPE: CPT | Performed by: PHYSICIAN ASSISTANT

## 2022-09-29 PROCEDURE — 99284 EMERGENCY DEPT VISIT MOD MDM: CPT

## 2022-09-29 PROCEDURE — 99284 EMERGENCY DEPT VISIT MOD MDM: CPT | Performed by: STUDENT IN AN ORGANIZED HEALTH CARE EDUCATION/TRAINING PROGRAM

## 2022-09-29 PROCEDURE — 85027 COMPLETE CBC AUTOMATED: CPT | Performed by: STUDENT IN AN ORGANIZED HEALTH CARE EDUCATION/TRAINING PROGRAM

## 2022-09-29 RX ORDER — KETOROLAC TROMETHAMINE 15 MG/ML
15 INJECTION, SOLUTION INTRAMUSCULAR; INTRAVENOUS ONCE
Status: COMPLETED | OUTPATIENT
Start: 2022-09-29 | End: 2022-09-29

## 2022-09-29 RX ORDER — ACETAMINOPHEN 325 MG/1
650 TABLET ORAL ONCE
Status: COMPLETED | OUTPATIENT
Start: 2022-09-29 | End: 2022-09-29

## 2022-09-29 RX ORDER — LIDOCAINE 4 G/G
2 PATCH TOPICAL ONCE
Status: DISCONTINUED | OUTPATIENT
Start: 2022-09-29 | End: 2022-09-29 | Stop reason: HOSPADM

## 2022-09-29 RX ADMIN — ACETAMINOPHEN 650 MG: 325 TABLET, FILM COATED ORAL at 15:13

## 2022-09-29 RX ADMIN — KETOROLAC TROMETHAMINE 15 MG: 15 INJECTION, SOLUTION INTRAMUSCULAR; INTRAVENOUS at 15:13

## 2022-09-29 RX ADMIN — Medication 2 PATCH: at 15:14

## 2022-09-29 ASSESSMENT — ACTIVITIES OF DAILY LIVING (ADL): ADLS_ACUITY_SCORE: 35

## 2022-09-29 NOTE — Clinical Note
Vesna Fabian was seen and treated in our emergency department on 9/29/2022.  She may return to work on 10/01/2022.       If you have any questions or concerns, please don't hesitate to call.      Curtis Shah MD

## 2022-09-29 NOTE — ED TRIAGE NOTES
"t presents with c/o bilateral lower back pain and fever  that has been present since Monday. Pt has been taking \"red and white\" mexican antibiotics.      "

## 2022-09-29 NOTE — ED PROVIDER NOTES
History     Chief Complaint   Patient presents with     Back Pain     HPI  Vesna Fabian is a 37 year old female presenting with bilateral low to mid back pain.  She is also had a fever and chills.  She did have some vomiting yesterday.  No history of bowel obstructions.  She has a history of a hysterectomy.  She still has her appendix and gallbladder.  She has no abdominal pain.  She is unsure when her last bowel movement was as she has very irregular bowel movement but believes it may have been around 2 days ago.  No urinary symptoms.  No history of kidney stones.  No chest pain or shortness of breath.  She has had no fever today but did have a fever yesterday.  She last took ibuprofen this morning.  She does have some diffuse body aches along with a low back pain.    Allergies:  No Known Allergies    Problem List:    Patient Active Problem List    Diagnosis Date Noted     S/P hysterectomy 04/06/2022     Priority: Medium     LAVH       Decreased ferritin 01/08/2021     Priority: Medium     Ligament tear 06/24/2020     Priority: Medium     Added automatically from request for surgery [id number]       ACP (advance care planning) 03/10/2016     Priority: Medium     Advance Care Planning 3/10/2016: ACP Review of Chart / Resources Provided:  Reviewed chart for advance care plan.  Vesna Fabian has been provided information and resources to begin or update their advance care plan.  Added by Kenisha Chilel             NO SHOW 06/18/2015     Priority: Medium     No shows for Dr. Valdez : 5/15/13; 4/29/15         ASCUS (atypical squamous cells of undetermined significance) on Pap smear 04/29/2014     Priority: Medium     Encounter for routine gynecological examination 04/21/2014     Priority: Medium     Problem list name updated by automated process. Provider to review       Decreased libido 04/21/2014     Priority: Medium     Sensate focus       Fatigue 04/21/2014     Priority: Medium     Smoker 04/21/2014     Priority:  Medium     Ready to quit. Only 3 cigs in a week       Family planning 2013     Priority: Medium     Moderate major depression (H) 2013     Priority: Medium     Denies suicidal or homicidal ideations at this time. Working with RAÚL Molina       Anxiety 2013     Priority: Medium     Chronic fatigue 2013     Priority: Medium     Bipolar disorder, unspecified (H) 2012     Priority: Medium        Past Medical History:    Past Medical History:   Diagnosis Date     Attention deficit disorder without mention of hyperactivity 2000     Bipolar affective disorder 2012     Bulimia 2012     Self-injurious behavior 2012     Suicide attempt 2012       Past Surgical History:    Past Surgical History:   Procedure Laterality Date     cyst ovary      LT     CYSTOSCOPY N/A 2022    Procedure: Cystoscopy;  Surgeon: Juan Alberto Cain MD;  Location: [facility]     LAPAROSCOPIC ASSISTED HYSTERECTOMY VAGINAL N/A 2022    Procedure: LAPAROSCOPIC ASSISTED VAGINAL HYSTERECTOMY;  Surgeon: Juan Alberto Cain MD;  Location: [facility]     MAMMOPLASTY REDUCTION  2011     REPAIR TENDON PATELLA Left 7/10/2020    Procedure: Left knee patellorfemoral ligament repair;  Surgeon: Melchor Perkins DO;  Location: [facility]     SALPINGECTOMY Bilateral 2022    Procedure: BILATERAL SALPINGECTOMY;  Surgeon: Juan Alberto Cain MD;  Location: [facility]     wisdom teeth removed         Family History:    Family History   Problem Relation Age of Onset     Cancer Maternal Grandmother         pancreatic cancer; cause of death     Cancer Father         rectal     Colon Cancer Father      Heart Disease Paternal Grandmother         heart disease     Hypertension Mother        Social History:  Marital Status:  Single [1]  Social History     Tobacco Use     Smoking status: Former Smoker     Years: 10.00     Types: Cigarettes     Quit date: 2020     Years since quittin.6     Smokeless tobacco: Never Used     Tobacco comment: quit  2011. longest tobacco free 1 year. no passive exposure   Substance Use Topics     Alcohol use: Yes     Comment: rarely     Drug use: No        Medications:    acetaminophen (TYLENOL) 325 MG tablet  ibuprofen (ADVIL/MOTRIN) 800 MG tablet  lisdexamfetamine (VYVANSE) 60 MG capsule  vitamin D2 (ERGOCALCIFEROL) 62036 units (1250 mcg) capsule          Review of Systems   All other systems reviewed and are negative.      Physical Exam   BP: 141/99  Pulse: 82  Temp: 99  F (37.2  C)  Resp: 16  SpO2: 99 %      Physical Exam  Vitals and nursing note reviewed.   Constitutional:       Appearance: Normal appearance.   HENT:      Head: Normocephalic and atraumatic.      Right Ear: External ear normal.      Left Ear: External ear normal.      Nose: Nose normal.      Mouth/Throat:      Mouth: Mucous membranes are moist.      Pharynx: Oropharynx is clear.   Eyes:      Conjunctiva/sclera: Conjunctivae normal.      Pupils: Pupils are equal, round, and reactive to light.   Neck:      Comments: Bilateral paraspinous muscle tenderness in the lumbar region  Cardiovascular:      Rate and Rhythm: Normal rate and regular rhythm.      Pulses: Normal pulses.      Heart sounds: Normal heart sounds.   Pulmonary:      Effort: Pulmonary effort is normal.      Breath sounds: Normal breath sounds.   Abdominal:      General: Abdomen is flat.      Tenderness: There is no abdominal tenderness.   Musculoskeletal:         General: Normal range of motion.      Cervical back: Normal range of motion. No tenderness.   Skin:     General: Skin is warm and dry.      Capillary Refill: Capillary refill takes less than 2 seconds.   Neurological:      General: No focal deficit present.      Mental Status: She is alert and oriented to person, place, and time.   Psychiatric:         Mood and Affect: Mood normal.         ED Course        ED Course as of 09/29/22 1611   Thu Sep 29, 2022   1609 Labs reassuring. Symptoms improved after medical management. Discussed  options including furher imaging vs watchful waiting at home and patient prefers watchful waiting. Will discharge with follow-up.   1610 Findings were discussed with the patient. Additional verbal instructions were discussed with the patient as well. Instructed to follow up with a primary care provider within 3-4 days. Also discussed specific warning signs and instructed to return to the ED if there are any concerns. Patient voiced understanding of instructions, questions were answered and the patient was discharged home in stable condition.     Procedures            Results for orders placed or performed during the hospital encounter of 09/29/22 (from the past 24 hour(s))   UA with Microscopic reflex to Culture    Specimen: Urine, Midstream   Result Value Ref Range    Color Urine Light Yellow Colorless, Straw, Light Yellow, Yellow    Appearance Urine Clear Clear    Glucose Urine Negative Negative mg/dL    Bilirubin Urine Negative Negative    Ketones Urine Negative Negative mg/dL    Specific Gravity Urine 1.015 1.003 - 1.035    Blood Urine Negative Negative    pH Urine 7.5 4.7 - 8.0    Protein Albumin Urine Negative Negative mg/dL    Urobilinogen Urine Normal Normal, 2.0 mg/dL    Nitrite Urine Negative Negative    Leukocyte Esterase Urine Negative Negative    Bacteria Urine Moderate (A) None Seen /HPF    Mucus Urine Present (A) None Seen /LPF    RBC Urine 3 (H) <=2 /HPF    WBC Urine <1 <=5 /HPF    Squamous Epithelials Urine 5 (H) <=1 /HPF    Narrative    Urine Culture not indicated   HCG qualitative urine   Result Value Ref Range    hCG Urine Qualitative Negative Negative   CBC with platelets   Result Value Ref Range    WBC Count 8.1 4.0 - 11.0 10e3/uL    RBC Count 5.37 (H) 3.80 - 5.20 10e6/uL    Hemoglobin 15.8 (H) 11.7 - 15.7 g/dL    Hematocrit 47.3 (H) 35.0 - 47.0 %    MCV 88 78 - 100 fL    MCH 29.4 26.5 - 33.0 pg    MCHC 33.4 31.5 - 36.5 g/dL    RDW 13.4 10.0 - 15.0 %    Platelet Count 356 150 - 450 10e3/uL  "  Basic metabolic panel   Result Value Ref Range    Sodium 136 133 - 144 mmol/L    Potassium 4.0 3.4 - 5.3 mmol/L    Chloride 105 94 - 109 mmol/L    Carbon Dioxide (CO2) 27 20 - 32 mmol/L    Anion Gap 4 3 - 14 mmol/L    Urea Nitrogen 11 7 - 30 mg/dL    Creatinine 0.69 0.52 - 1.04 mg/dL    Calcium 8.8 8.5 - 10.1 mg/dL    Glucose 84 70 - 99 mg/dL    GFR Estimate >90 >60 mL/min/1.73m2   Hepatic panel   Result Value Ref Range    Bilirubin Total 0.5 0.2 - 1.3 mg/dL    Bilirubin Direct 0.1 0.0 - 0.2 mg/dL    Protein Total 8.0 6.8 - 8.8 g/dL    Albumin 4.2 3.4 - 5.0 g/dL    Alkaline Phosphatase 84 40 - 150 U/L    AST 23 0 - 45 U/L    ALT 38 0 - 50 U/L   Lipase   Result Value Ref Range    Lipase 56 (L) 73 - 393 U/L       Medications   Lidocaine (LIDOCARE) 4 % Patch 2 patch (2 patches Transdermal Patch/Med Applied 9/29/22 1514)   acetaminophen (TYLENOL) tablet 650 mg (650 mg Oral Given 9/29/22 1513)   ketorolac (TORADOL) injection 15 mg (15 mg Intramuscular Given 9/29/22 1513)       Assessments & Plan (with Medical Decision Making)     I have reviewed the nursing notes.    Bilateral low back pain, vomiting, and a recent fever.  She said no cough or shortness of breath.  No sore throat.  Her presentation seems like a viral prodrome with low back muscular tenderness.  However, she does note she was taking some \"Mexican antibiotics.\"  She does not have typical UTI symptoms for her.  Her urine here is without evidence of infection.  Her exam is reassuring.  We will plan on medical management, laboratory evaluation, and reassessment before we would pursue any further abdominal or flank imaging.  She does not have a presentation is consistent with a kidney stone.  Her UA does not show anything that would be consistent with pyelonephritis.  Overall she appears quite well, is tolerating p.o., and if her labs do not suggest an obvious derangement, she would then be appropriate for discharge and close primary care follow-up with " return precautions.  See ED course.  Her exam is not consistent with genitourinary etiology at this time.    I have reviewed the findings, diagnosis, plan and need for follow up with the patient.    New Prescriptions    No medications on file       Final diagnoses:   Acute bilateral low back pain without sciatica       9/29/2022   [facility]     Curtis Shah MD  09/29/22 6918

## 2022-09-29 NOTE — DISCHARGE INSTRUCTIONS
- Please take Tylenol, Ibuprofen, use lidocaine patches, and use heat/ice for your symptoms  - Please return to the Emergency Room if you do not improve, feel worse, or have any new or concerning symptoms. We would especially want to see you back if you experience worsening pain, abdominal pain, fever, or vomiting.  - Please follow up with a primary care physician in 3 days if not improved to discuss further treatment. Otherwise follow-up in 1-2 weeks to discuss any other testing or ongoing care.

## 2022-10-03 NOTE — TELEPHONE ENCOUNTER
Yana       Last Written Prescription Date:  8/30/22  Last Fill Quantity: 30,   # refills: 0  Last Office Visit: 3/25/22  Future Office visit:

## 2022-10-04 RX ORDER — LISDEXAMFETAMINE DIMESYLATE 60 MG/1
60 CAPSULE ORAL DAILY
Qty: 30 CAPSULE | Refills: 0 | Status: SHIPPED | OUTPATIENT
Start: 2022-10-04 | End: 2022-11-10

## 2022-11-10 ENCOUNTER — MYC REFILL (OUTPATIENT)
Dept: FAMILY MEDICINE | Facility: OTHER | Age: 37
End: 2022-11-10

## 2022-11-10 DIAGNOSIS — F90.2 ADHD (ATTENTION DEFICIT HYPERACTIVITY DISORDER), COMBINED TYPE: ICD-10-CM

## 2022-11-14 NOTE — TELEPHONE ENCOUNTER
lisdexamfetamine (VYVANSE) 60 MG capsule 30 capsule 0 10/4/2022     lov 03/25/22      Future Office visit:   none    Routing refill request to provider for review/approval because:

## 2022-11-15 RX ORDER — LISDEXAMFETAMINE DIMESYLATE 60 MG/1
60 CAPSULE ORAL DAILY
Qty: 30 CAPSULE | Refills: 0 | Status: SHIPPED | OUTPATIENT
Start: 2022-11-15 | End: 2022-12-16

## 2022-11-30 ENCOUNTER — ALLIED HEALTH/NURSE VISIT (OUTPATIENT)
Dept: FAMILY MEDICINE | Facility: OTHER | Age: 37
End: 2022-11-30
Attending: PHYSICIAN ASSISTANT
Payer: COMMERCIAL

## 2022-11-30 ENCOUNTER — TELEPHONE (OUTPATIENT)
Dept: FAMILY MEDICINE | Facility: OTHER | Age: 37
End: 2022-11-30

## 2022-11-30 DIAGNOSIS — Z20.822 SUSPECTED 2019 NOVEL CORONAVIRUS INFECTION: ICD-10-CM

## 2022-11-30 LAB — SARS-COV-2 RNA RESP QL NAA+PROBE: POSITIVE

## 2022-11-30 PROCEDURE — U0003 INFECTIOUS AGENT DETECTION BY NUCLEIC ACID (DNA OR RNA); SEVERE ACUTE RESPIRATORY SYNDROME CORONAVIRUS 2 (SARS-COV-2) (CORONAVIRUS DISEASE [COVID-19]), AMPLIFIED PROBE TECHNIQUE, MAKING USE OF HIGH THROUGHPUT TECHNOLOGIES AS DESCRIBED BY CMS-2020-01-R: HCPCS

## 2022-11-30 PROCEDURE — U0005 INFEC AGEN DETEC AMPLI PROBE: HCPCS

## 2022-11-30 NOTE — PROGRESS NOTES
"Vesna is a 37 year old who is being evaluated via a billable telephone visit.      What phone number would you like to be contacted at? [phone]  [facility]    Assessment & Plan     1. COVID-19 virus infection  Declined albuterol or cough medication for now  Will continue with over the counter medications.   No medication changes required  Quarantine guidelines reviewed.   - nirmatrelvir and ritonavir (PAXLOVID) therapy pack; Take 3 tablets by mouth 2 times daily for 5 days (Take 2 Nirmatrelvir tablets and 1 Ritonavir tablet twice daily for 5 days)  Dispense: 30 each; Refill: 0         BMI:   Estimated body mass index is 30.95 kg/m  as calculated from the following:    Height as of 5/5/22: 1.651 m (5' 5\").    Weight as of 5/5/22: 84.4 kg (186 lb).     Follow-up if no improvement - to ED with any acute concerns, issues.      Lorna Luna NP  [facility]   Vesna is a 37 year old presenting via phone for the following health issues:  Covid Concern      HPI     Underlying Medical Conditions: Depression, anxiety, bipolar disorder  Patient testing positive on 11/30   Test by:  At home covid test  Start of Symptoms: 11/29  Covid 19 Vaccination Status:  Pfizer initial   Are you pregnant, breastfeeding or trying to conceive? No     COVID-19 Symptom Review  Symptoms Start Date?  11/29     Are any of the following symptoms significant for you?    New or worsening difficulty breathing? hard to catch breath due to mucous, but no difficulty breathing    Cough? yes     Dry or Productive?  Productive     Fever?  yes     Highest temp past 24 hours?  101.3    Chills?  yes    Headache:  yes    Sore throat: yes    Chest pain: no, only when coughing hard    Diarrhea: yes    Body aches?  yes    Nasal congestion or drainage?  both     What treatments has patient tried?  advil cold and sinus, gatorade  Does patient live in a nursing home, group home, or " shelter? no   Does patient have a way to get food/medications during quarantine? yes     Appointment Scheduled:      Pharmacy: [facility]    Confirmed with Pharmacy: Paxlovid & Molnupiravir in stock  Last Comprehensive Metabolic Panel:  Sodium   Date Value Ref Range Status   09/29/2022 136 133 - 144 mmol/L Final   07/07/2020 138 133 - 144 mmol/L Final     Potassium   Date Value Ref Range Status   09/29/2022 4.0 3.4 - 5.3 mmol/L Final   07/07/2020 3.6 3.4 - 5.3 mmol/L Final     Chloride   Date Value Ref Range Status   09/29/2022 105 94 - 109 mmol/L Final   07/07/2020 106 94 - 109 mmol/L Final     Carbon Dioxide   Date Value Ref Range Status   07/07/2020 29 20 - 32 mmol/L Final     Carbon Dioxide (CO2)   Date Value Ref Range Status   09/29/2022 27 20 - 32 mmol/L Final     Anion Gap   Date Value Ref Range Status   09/29/2022 4 3 - 14 mmol/L Final   07/07/2020 3 3 - 14 mmol/L Final     Glucose   Date Value Ref Range Status   09/29/2022 84 70 - 99 mg/dL Final   07/07/2020 94 70 - 99 mg/dL Final     Urea Nitrogen   Date Value Ref Range Status   09/29/2022 11 7 - 30 mg/dL Final   07/07/2020 6 (L) 7 - 30 mg/dL Final     Creatinine   Date Value Ref Range Status   09/29/2022 0.69 0.52 - 1.04 mg/dL Final   07/07/2020 0.63 0.52 - 1.04 mg/dL Final     GFR Estimate   Date Value Ref Range Status   09/29/2022 >90 >60 mL/min/1.73m2 Final     Comment:     Effective December 21, 2021 eGFRcr in adults is calculated using the 2021 CKD-EPI creatinine equation which includes age and gender (Phil singh al., NEJM, DOI: 10.1056/UWIDeo4305536)   07/07/2020 >90 >60 mL/min/[1.73_m2] Final     Comment:     Non  GFR Calc  Starting 12/18/2018, serum creatinine based estimated GFR (eGFR) will be   calculated using the Chronic Kidney Disease Epidemiology Collaboration   (CKD-EPI) equation.       Calcium   Date Value Ref Range Status   09/29/2022 8.8 8.5 - 10.1 mg/dL Final   07/07/2020 8.8 8.5 - 10.1 mg/dL Final     Liver Function  Studies - Recent Labs   Lab Test 09/29/22  1533   PROTTOTAL 8.0   ALBUMIN 4.2   BILITOTAL 0.5   ALKPHOS 84   AST 23   ALT 38       No medication review  Current Outpatient Medications   Medication     acetaminophen (TYLENOL) 325 MG tablet     ibuprofen (ADVIL/MOTRIN) 800 MG tablet     lisdexamfetamine (VYVANSE) 60 MG capsule     vitamin D2 (ERGOCALCIFEROL) 78126 units (1250 mcg) capsule     No current facility-administered medications for this visit.         Review of Systems   Constitutional, HEENT, cardiovascular, pulmonary, gi and gu systems are negative, except as otherwise noted.      Objective           Vitals:  No vitals were obtained today due to virtual visit.    Physical Exam   alert and no distress but very congested.   PSYCH: Alert and oriented times 3; coherent speech, normal   rate and volume, able to articulate logical thoughts, able   to abstract reason, no tangential thoughts, no hallucinations   or delusions  Her affect is normal  RESP: No cough, no audible wheezing, able to talk in full sentences  Remainder of exam unable to be completed due to telephone visits                Phone call duration: 12 minutes

## 2022-11-30 NOTE — TELEPHONE ENCOUNTER
"Call placed to patient to discuss positive covid 19 results.   Underlying Medical Conditions: Depression, anxiety, bipolar disorder  Patient testing positive on 11/30   Test by:  At home covid test  Start of Symptoms: 11/29  Covid 19 Vaccination Status:  Pfizer initial   Are you pregnant, breastfeeding or trying to conceive? No     COVID-19 Symptom Review  Symptoms Start Date?  11/29     Are any of the following symptoms significant for you?    New or worsening difficulty breathing? hard to catch breath due to mucous, but no difficulty breathing    Cough? yes     Dry or Productive?  Productive     Fever?  yes     Highest temp past 24 hours?  101.3    Chills?  yes    Headache:  yes    Sore throat: yes    Chest pain: no, only when coughing hard    Diarrhea: yes    Body aches?  yes    Nasal congestion or drainage?  both     What treatments has patient tried?  advil cold and sinus, gatorade  Does patient live in a nursing home, group home, or shelter? no   Does patient have a way to get food/medications during quarantine? yes     Appointment Scheduled:      Pharmacy: [facility]    Confirmed with Pharmacy: Paxlovid & Molnupiravir in stock      Instructions below provided to patient. Patient verbalized understanding.     Instructions for Patients  Your COVID-19 test was positive. This means you have the virus. Please follow the \"How can I take care of myself\" and \"How do I self-isolate?\" guidelines in these instructions.    What treatments are available?  Over-the-counter medicines may help with your symptoms such as runny or stuffy nose, cough, chills, and fever. Talk to your care team about your options.     Some people are at high risk for severe illness (for example if you have a weak immune system, you're 65 or older, or you have certain medical problems). If your risk it high and your symptoms started in the last 5 to 7 days, we strongly recommend for you to get COVID treatment as soon as possible before you " get really sick. Paxlovid, Molnupiravir and the monoclonal antibody treatments are proven safe and effective, make you feel better faster, and prevent hospitalization and death.       What are the symptoms of COVID-19?  Symptoms can include fever, cough, shortness of breath, chills, headache, muscle pain sore throat, fatigue, runny or stuffy nose, and loss of taste and smell. Other less common symptoms include nausea, vomiting, or diarrhea (watery stools).    Know when to call 911. Emergency warning signs include:    Trouble breathing or shortness of breath    Pain or pressure in the chest that doesn't go away    Feeling confused like you haven't felt before, or not being able to wake up    Bluish-colored lips or face    How can I take care of myself?  1. Get lots of rest. Drink extra fluids (unless a doctor has told you not to).  2. Take Tylenol (acetaminophen) for fever or pain. If you have liver or kidney problems, ask your family doctor if it's okay to take Tylenol   Adults:   650 mg (two 325 mg pills or tablets) every 4 to 6 hours, or...   1,000 mg (two 500 mg pills or tablets) every 8 hours as needed.  Note: Don't take more than 3,000 mg in one day. Acetaminophen is found in many medicines (both prescribed and over the counter). Read all labels to be sure you don't take too much.  For children, check the Tylenol bottle for the right dose. The dose is based on the child's age or weight.  3. Take over the counter medicines for your symptoms as needed. Talk to your pharmacist.  4. If you have other health problems (like cancer, heart failure, an organ transplant, or severe kidney disease): Call your specialty clinic if you don't feel better in the next 2 days.    These guidelines are for isolating and quarantining before returning to work, school or .     For employers, schools and day cares: This is an official notice for this person's medical guidelines for returning in-person.     For health care sites:  The CDC gives different isolation and quarantine guidelines for healthcare sites, please check with these sites before arriving.     How do I self-isolate?  You isolate when you have symptoms of COVID or a test shows you have COVID, even if you don't have symptoms.     If you DO have symptoms:  o Stay home and away from others  - For at least 5 days after your symptoms started, AND   - You are fever free for 24 hours (with no medicine that reduces fever), AND  - Your other symptoms are better.  o Wear a mask for 10 full days any time you are around others.    If you DON'T have symptoms:  o Stay at home and away from others for at least 5 days after your positive test.  o Wear a mask for 10 full days any time you are around others.    How and when do I quarantine?  You quarantine when you may have been exposed to the virus and DON'T have symptoms.     Stay home and away from others.     You must quarantine for 5 days after your last contact with a person who has COVID.  o Note: If you are fully vaccinated, you don't need to quarantine. You should still follow the steps below.     Wear a mask for 10 full days any time you're around others.    Get tested at least 5 days after you were exposed, even if you don't have symptoms.     If you start to have symptoms, isolate right away and get tested.    Where can I get more information?    [facility] COVID-19 Resource Hub: [email or web address]     CDC Quarantine & Isolation: https://www.cdc.gov/coronavirus/2019-ncov/your-health/quarantine-isolation.html     CDC - What to Do If You're Sick: https://www.cdc.gov/coronavirus/2019-ncov/if-you-are-sick/index.html    [facility] clinical trials (COVID-19 research studies): [email or web address]/umn-clinical-[email or web address]    [place] Department of Health COVID-19 Public Hotline: 1-339.837.9312

## 2022-12-01 ENCOUNTER — VIRTUAL VISIT (OUTPATIENT)
Dept: FAMILY MEDICINE | Facility: OTHER | Age: 37
End: 2022-12-01
Payer: COMMERCIAL

## 2022-12-01 DIAGNOSIS — U07.1 COVID-19 VIRUS INFECTION: Primary | ICD-10-CM

## 2022-12-01 PROCEDURE — 99213 OFFICE O/P EST LOW 20 MIN: CPT | Mod: 95 | Performed by: NURSE PRACTITIONER

## 2022-12-01 NOTE — PATIENT INSTRUCTIONS
"  Assessment & Plan     1. COVID-19 virus infection  Declined albuterol or cough medication for now  Will continue with over the counter medications.   No medication changes required  Quarantine guidelines reviewed.   - nirmatrelvir and ritonavir (PAXLOVID) therapy pack; Take 3 tablets by mouth 2 times daily for 5 days (Take 2 Nirmatrelvir tablets and 1 Ritonavir tablet twice daily for 5 days)  Dispense: 30 each; Refill: 0         BMI:   Estimated body mass index is 30.95 kg/m  as calculated from the following:    Height as of 5/5/22: 1.651 m (5' 5\").    Weight as of 5/5/22: 84.4 kg (186 lb).     Follow-up if no improvement - to ED with any acute concerns, issues.      Lorna Luna NP  [facility]      COVID-19 Outpatient Treatments  Your care team can help you find the best treatments for COVID-19. Talk to a health care provider or refer to the FDA medicine fact sheets below.  Important: You CAN'T have molnupiravir or Paxlovid if you are starting the medicine more than 5 days after your symptoms have started.  Paxlovid: https://www.fda.gov/media/985123/download  Molnupiravir: https://www.fda.gov/media/634470/download  Monoclonal antibodies: https://combatcovid.hhs.gov/what-are-monoclonal-antibodies  Paxlovid (nimatrelvir and ritonavir)  How it works  Two medicines (nirmatrelvir and ritonavir) are taken together. They stop the virus from growing. Less amount of virus is easier for your body to fight.  Benefits  Lowers risk of a hospital stay or death from COVID-19.  How to take  Medicine comes in a daily container with both medicine tablets. Take by mouth twice daily (once in the morning, once at night) for 5 days.  The number of tablets to take varies by patient.  Don't chew or break capsules. Swallow whole.  When to take  Take as soon as possible after positive COVID-19 test result, and within 5 days of your first symptoms.  Who can take it  Patients must be 12 years or older, " weigh at least 88 pounds, and have tested positive for COVID-19. This is the preferred treatment for pregnant patients.  Possible side effects  Can cause altered sense of taste, diarrhea (loose, watery stools), high blood pressure, muscle aches.  Medicine conflicts  Some medicines may conflict with Paxlovid and may cause serious side effects.  Tell your care team about all the medicines you take, including prescription and over-the-counter medicines, vitamins and herbal supplements.  Your provider will review your medicines to make sure that you can safely take Paxlovid.  Cautions  Paxlovid is not advised for patients with severe kidney or liver disease. If you have kidney or liver problems, the dose may need to be changed.  If you are pregnant or breastfeeding, talk to your care team about your options.  If you are sexually active, use trusted birth control while taking Paxlovid.  Molnupiravir  How it works  Stops the virus from growing. Less amount of virus is easier for your body to fight.  Benefits  Lowers risk of a hospital stay or death from COVID-19.  How to take  Take 4 capsules by mouth every 12 hours (4 in the morning and 4 at night) for 5 days. Don't chew or break capsules. Swallow whole.  When to take  Take as soon as possible after positive COVID-19 test result, and within 5 days of your first symptoms.  Who can take it  Patients must be 18 years or older and have tested positive for COVID-19.  Possible side effects  Diarrhea (loose, watery stools), nausea (feeling sick to your stomach), dizziness, headaches.  Medicine conflicts  Right now, there are no known conflicts with other drugs. But tell your care team all medicines you take.  Cautions  This is not advised for patients who are pregnant.  Patients who could become pregnant should use trusted birth control until 4 days after their last dose.  Sexually active people of any gender should use trusted birth control for 3 months after their last  dose.  Monoclonal antibodies  How it works  Monoclonal antibodies can detect pieces of the COVID virus and stop it from infecting your cells.  Benefits  Lowers risk of a hospital stay or death from COVID-19. Monoclonal antibodies are known to work well against the omicron variant.  How it is given to you  You will receive the treatment either by an infusion through your vein (IV) or shots.  When to take  Get as soon as possible after you test positive for COVID-19, and within 7 days of your first symptoms.  Who can take it  Patients must be 12 years or older, weigh at least 88 pounds and have tested positive for COVID-19.  Possible side effects  Fever, chills, diarrhea (loose, watery stools), dizziness, itchiness and rash.  More serious side effects include: fever, difficulty breathing, low oxygen level in your blood, chills, tiredness, fast or slow heart rate, chest discomfort or pain, weakness, confusion, nausea, headache, shortness of breath, low or high blood pressure, wheezing, swelling of your lips, face, or throat, rash including hives, itching, muscle aches, dizziness, feeling faint and sweating.  If you receive an IV, you may have brief pain, bleeding and bruising of the skin, soreness, swelling and possible infection at the place where you get the IV needle.  Medicine conflicts  Please tell you care team other medicines you take so they can assess if there are any conflicts.  Cautions  Your doctor will talk with you about risks and benefits of this treatment and will help choose the best option for you.  For informational purposes only. Not to replace the advice of your health care provider.  Copyright   2022 [facility]. All rights reserved. Clinically reviewed by Joelle Beck. [employer] [id number] - 08/22.    Instructions for Patients      What are the symptoms of COVID-19?  Symptoms can include fever, cough, shortness of breath, chills, headache, muscle pain sore throat, fatigue, runny or  stuffy nose, and loss of taste and smell. Other less common symptoms include nausea, vomiting, or diarrhea (watery stools).    Know when to call 911. Emergency warning signs include:  Trouble breathing or shortness of breath  Pain or pressure in the chest that doesn't go away  Feeling confused like you haven't felt before, or not being able to wake up  Bluish-colored lips or face    How can I take care of myself?  Get lots of rest. Drink extra fluids (unless a doctor has told you not to).  Take Tylenol (acetaminophen) for fever or pain. If you have liver or kidney problems, ask your family doctor if it's okay to take Tylenol   Adults:   650 mg (two 325 mg pills or tablets) every 4 to 6 hours, or...   1,000 mg (two 500 mg pills or tablets) every 8 hours as needed.  Note: Don't take more than 3,000 mg in one day. Acetaminophen is found in many medicines (both prescribed and over the counter). Read all labels to be sure you don't take too much.  For children, check the Tylenol bottle for the right dose. The dose is based on the child's age or weight.  Take over the counter medicines for your symptoms as needed. Talk to your pharmacist.  If you have other health problems (like cancer, heart failure, an organ transplant, or severe kidney disease): Call your specialty clinic if you don't feel better in the next 2 days.    Where can I get more information?  [facility] COVID-19 Resource Hub: [email or web address]/   CDC Quarantine & Isolation: https://www.cdc.gov/coronavirus/2019-ncov/your-health/quarantine-isolation.html   CDC - What to Do If You're Sick: https://www.cdc.gov/coronavirus/2019-ncov/if-you-are-sick/index.html  [facility] clinical trials (COVID-19 research studies): clinicalaffairs.[facility]/umn-clinical-trials  [place] Department of Health COVID-19 Public Hotline: 1-361.494.6120  Coping with Life After COVID-19  Being in the hospital because of COVID-19 is scary. Going home can  be scary, too. You may face changes to your life, the way you work or what you can eat. It s hard to adjust to change, and it s normal to feel afraid, frustrated or even angry. These feelings usually go away over time. If your feelings don t start to get better, it s called  adjustment disorder.      What signs should I look out for?  Adjustment disorder can happen to anyone in a time of stress. It makes it hard to cope with daily life. You may feel lonely or fight with loved ones, even if you re glad to be home. Watch for these signs:  Fear or worry  Hard time focusing  Sadness or anger  Trouble talking to family or friends  Feeling like you don t fit in or isolating yourself  Problems with sleep   Drinking alcohol or taking drugs to cope    What can I do?  You can help yourself get better. Feeling you have control helps you move forward. You may wonder if you ll be able to do things you did before. Be patient. Do your best to make the most of every day. Try to build relationships, be as active as you can, eat right and keep a sense of humor. Avoid smoking and drinking too much alcohol. Call your family doctor or clinic if you re not sure what to do. They can guide you to care or other services.    When should I get help?  Think about getting counseling if your sadness or frustration gets worse. Together with a trained counselor, you can talk about your problems adjusting to life after your hospital stay. You can come up with new ways to handle changes that give you more control. Your family doctor or care team can help you find a counselor.     Get help if you re thinking about hurting yourself. If you need help right away, call 911 or go to the nearest emergency room. You can also try the Crisis Text Line.    Crisis Text Line: 906-608 (http://www.crisistextline.org)  The Crisis Text Line serves anyone, in any crisis. It gives free, 24/7 support. Here's how it works:  Text HOME to 303512 from anywhere in the [place],  anytime, about any type of crisis.  A live, trained Crisis Counselor will text you back quickly.  The volunteer Crisis Counselor can help you move from a  hot moment  to a  cool moment.  They can also help you work out a safety plan.

## 2022-12-16 ENCOUNTER — OFFICE VISIT (OUTPATIENT)
Dept: FAMILY MEDICINE | Facility: OTHER | Age: 37
End: 2022-12-16
Attending: PHYSICIAN ASSISTANT
Payer: COMMERCIAL

## 2022-12-16 VITALS
DIASTOLIC BLOOD PRESSURE: 84 MMHG | OXYGEN SATURATION: 98 % | HEIGHT: 65 IN | SYSTOLIC BLOOD PRESSURE: 136 MMHG | BODY MASS INDEX: 30.99 KG/M2 | HEART RATE: 100 BPM | TEMPERATURE: 99.2 F | WEIGHT: 186 LBS

## 2022-12-16 DIAGNOSIS — F90.2 ADHD (ATTENTION DEFICIT HYPERACTIVITY DISORDER), COMBINED TYPE: ICD-10-CM

## 2022-12-16 DIAGNOSIS — M79.602 BILATERAL ARM PAIN: Primary | ICD-10-CM

## 2022-12-16 DIAGNOSIS — M79.601 BILATERAL ARM PAIN: Primary | ICD-10-CM

## 2022-12-16 LAB — CRP SERPL-MCNC: <3 MG/L

## 2022-12-16 PROCEDURE — 86038 ANTINUCLEAR ANTIBODIES: CPT | Performed by: PHYSICIAN ASSISTANT

## 2022-12-16 PROCEDURE — 82085 ASSAY OF ALDOLASE: CPT | Mod: 90 | Performed by: PHYSICIAN ASSISTANT

## 2022-12-16 PROCEDURE — 36415 COLL VENOUS BLD VENIPUNCTURE: CPT | Performed by: PHYSICIAN ASSISTANT

## 2022-12-16 PROCEDURE — 86618 LYME DISEASE ANTIBODY: CPT | Performed by: PHYSICIAN ASSISTANT

## 2022-12-16 PROCEDURE — 99213 OFFICE O/P EST LOW 20 MIN: CPT | Performed by: PHYSICIAN ASSISTANT

## 2022-12-16 PROCEDURE — 86039 ANTINUCLEAR ANTIBODIES (ANA): CPT | Performed by: PHYSICIAN ASSISTANT

## 2022-12-16 PROCEDURE — 86140 C-REACTIVE PROTEIN: CPT | Performed by: PHYSICIAN ASSISTANT

## 2022-12-16 PROCEDURE — 86225 DNA ANTIBODY NATIVE: CPT | Performed by: PHYSICIAN ASSISTANT

## 2022-12-16 PROCEDURE — 86431 RHEUMATOID FACTOR QUANT: CPT | Performed by: PHYSICIAN ASSISTANT

## 2022-12-16 RX ORDER — LISDEXAMFETAMINE DIMESYLATE 60 MG/1
60 CAPSULE ORAL DAILY
Qty: 30 CAPSULE | Refills: 0 | Status: SHIPPED | OUTPATIENT
Start: 2022-12-16 | End: 2023-01-12

## 2022-12-16 RX ORDER — LISDEXAMFETAMINE DIMESYLATE 60 MG/1
60 CAPSULE ORAL DAILY
Qty: 30 CAPSULE | Refills: 0 | Status: SHIPPED | OUTPATIENT
Start: 2022-12-16 | End: 2022-12-16

## 2022-12-16 ASSESSMENT — PAIN SCALES - GENERAL: PAINLEVEL: NO PAIN (0)

## 2022-12-16 NOTE — LETTER
Opioid / Opioid Plus Controlled Substance Agreement    This is an agreement between you and your provider about the safe and appropriate use of controlled substance/opioids prescribed by your care team. Controlled substances are medicines that can cause physical and mental dependence (abuse).    There are strict laws about having and using these medicines. We here at [facility] are committing to working with you in your efforts to get better. To support you in this work, we ll help you schedule regular office appointments for medicine refills. If we must cancel or change your appointment for any reason, we ll make sure you have enough medicine to last until your next appointment.     As a Provider, I will:    Listen carefully to your concerns and treat you with respect.     Recommend a treatment plan that I believe is in your best interest. This plan may involve therapies other than opioid pain medication.     Talk with you often about the possible benefits, and the risk of harm of any medicine that we prescribe for you.     Provide a plan on how to taper (discontinue or go off) using this medicine if the decision is made to stop its use.    As a Patient, I understand that opioid(s):     Are a controlled substance prescribed by my care team to help me function or work and manage my condition(s).     Are strong medicines and can cause serious side effects such as:    Drowsiness, which can seriously affect my driving ability    A lower breathing rate, enough to cause death    Harm to my thinking ability     Depression     Abuse of and addiction to this medicine    Need to be taken exactly as prescribed. Combining opioids with certain medicines or chemicals (such as illegal drugs, sedatives, sleeping pills, and benzodiazepines) can be dangerous or even fatal. If I stop opioids suddenly, I may have severe withdrawal symptoms.    Do not work for all types of pain nor for all patients. If they re not helpful, I may  be asked to stop them.        The risks, benefits and side effects of these medicine(s) were explained to me. I agree that:  1. I will take part in other treatments as advised by my care team. This may be psychiatry or counseling, physical therapy, behavioral therapy, group treatment or a referral to a specialist.     2. I will keep all my appointments. I understand that this is part of the monitoring of opioids. My care team may require an office visit for EVERY opioid/controlled substance refill. If I miss appointments or don t follow instructions, my care team may stop my medicine.    3. I will take my medicines as prescribed. I will not change the dose or schedule unless my care team tells me to. There will be no refills if I run out early.     4. I may be asked to come to the clinic and complete a urine drug test or complete a pill count at any time. If I don t give a urine sample or participate in a pill count, the care team may stop my medicine.    5. I will only receive prescriptions from this clinic for chronic pain. If I am treated by another provider for acute pain issues, I will tell them that I am taking opioid pain medication for chronic pain and that I have a treatment agreement with this provider. I will inform my [facility] care team within one business day if I am given a prescription for any pain medication by another healthcare provider. My [facility] care team can contact other providers and pharmacists about my use of any medicines.    6. It is up to me to make sure that I don t run out of my medicines on weekends or holidays. If my care team is willing to refill my opioid prescription without a visit, I must request refills only during office hours. Refills may take up to 3 business days to process. I will use one pharmacy to fill all my opioid and other controlled substance prescriptions. I will notify the clinic about any changes to my insurance or medication  availability.    7. I am responsible for my prescriptions. If the medicine/prescription is lost, stolen or destroyed, it will not be replaced. I also agree not to share controlled substance medicines with anyone.    8. I am aware I should not use any illegal or recreational drugs. I agree not to drink alcohol unless my care team says I can.       9. If I enroll in the [place] Medical Cannabis program, I will tell my care team prior to my next refill.     10. I will tell my care team right away if I become pregnant, have a new medical problem treated outside of my regular clinic, or have a change in my medications.    11. I understand that this medicine can affect my thinking, judgment and reaction time. Alcohol and drugs affect the brain and body, which can affect the safety of my driving. Being under the influence of alcohol or drugs can affect my decision-making, behaviors, personal safety, and the safety of others. Driving while impaired (DWI) can occur if a person is driving, operating, or in physical control of a car, motorcycle, boat, snowmobile, ATV, motorbike, off-road vehicle, or any other motor vehicle ([place] Statute 169A.20). I understand the risk if I choose to drive or operate any vehicle or machinery.    I understand that if I do not follow any of the conditions above, my prescriptions or treatment may be stopped or changed.          Opioids  What You Need to Know    What are opioids?   Opioids are pain medicines that must be prescribed by a doctor. They are also known as narcotics.     Examples are:   1. morphine (MS Contin, Emma)  2. oxycodone (Oxycontin)  3. oxycodone and acetaminophen (Percocet)  4. hydrocodone and acetaminophen (Vicodin, Norco)   5. fentanyl patch (Duragesic)   6. hydromorphone (Dilaudid)   7. methadone  8. codeine (Tylenol #3)     What do opioids do well?   Opioids are best for severe short-term pain such as after a surgery or injury. They may work well for cancer pain. They may  help some people with long-lasting (chronic) pain.     What do opioids NOT do well?   Opioids never get rid of pain entirely, and they don t work well for most patients with chronic pain. Opioids don t reduce swelling, one of the causes of pain.                                    Other ways to manage chronic pain and improve function include:       Treat the health problem that may be causing pain    Anti-inflammation medicines, which reduce swelling and tenderness, such as ibuprofen (Advil, Motrin) or naproxen (Aleve)    Acetaminophen (Tylenol)    Antidepressants and anti-seizure medicines, especially for nerve pain    Topical treatments such as patches or creams    Injections or nerve blocks    Chiropractic or osteopathic treatment    Acupuncture, massage, deep breathing, meditation, visual imagery, aromatherapy    Use heat or ice at the pain site    Physical therapy     Exercise    Stop smoking    Take part in therapy       Risks and side effects     Talk to your doctor before you start or decide to keep taking opioids. Possible side effects include:      Lowering your breathing rate enough to cause death    Overdose, including death, especially if taking higher than prescribed doses    Worse depression symptoms; less pleasure in things you usually enjoy    Feeling tired or sluggish    Slower thoughts or cloudy thinking    Being more sensitive to pain over time; pain is harder to control    Trouble sleeping or restless sleep    Changes in hormone levels (for example, less testosterone)    Changes in sex drive or ability to have sex    Constipation    Unsafe driving    Itching and sweating    Dizziness    Nausea, throwing up and dry mouth    What else should I know about opioids?    Opioids may lead to dependence, tolerance, or addiction.      Dependence means that if you stop or reduce the medicine too quickly, you will have withdrawal symptoms. These include loose poop (diarrhea), jitters, flu-like symptoms,  nervousness and tremors. Dependence is not the same as addiction.                       Tolerance means needing higher doses over time to get the same effect. This may increase the chance of serious side effects.      Addiction is when people improperly use a substance that harms their body, their mind or their relations with others. Use of opiates can cause a relapse of addiction if you have a history of drug or alcohol abuse.      People who have used opioids for a long time may have a lower quality of life, worse depression, higher levels of pain and more visits to doctors.    You can overdose on opioids. Take these steps to lower your risk of overdose:    1. Recognize the signs:  Signs of overdose include decrease or loss of consciousness (blackout), slowed breathing, trouble waking up and blue lips. If someone is worried about overdose, they should call 911.    2. Talk to your doctor about Narcan (naloxone).   If you are at risk for overdose, you may be given a prescription for Narcan. This medicine very quickly reverses the effects of opioids.   If you overdose, a friend or family member can give you Narcan while waiting for the ambulance. They need to know the signs of overdose and how to give Narcan.     3. Don't use alcohol or street drugs.   Taking them with opioids can cause death.    4. Do not take any of these medicines unless your doctor says it s OK. Taking these with opioids can cause death:    Benzodiazepines, such as lorazepam (Ativan), alprazolam (Xanax) or diazepam (Valium)    Muscle relaxers, such as cyclobenzaprine (Flexeril)    Sleeping pills like zolpidem (Ambien)     Other opioids      How to keep you and other people safe while taking opioids:    1. Never share your opioids with others.  Opioid medicines are regulated by the Drug Enforcement Agency ([employer]). Selling or sharing medications is a criminal act.    2. Be sure to store opioids in a secure place, locked up if possible. Young children  can easily swallow them and overdose.    3. When you are traveling with your medicines, keep them in the original bottles. If you use a pill box, be sure you also carry a copy of your medicine list from your clinic or pharmacy.    4. Safe disposal of opioids    Most pharmacies have places to get rid of medicine, called disposal kiosks. Medicine disposal options are also available in every [facility]. Search your county and  medication disposal  to find more options. You can find more details at:  https://www.[facility].On license of [facility].[place]./living-green/managing-unwanted-medications     I agree that my provider, clinic care team, and pharmacy may work with any city, state or federal law enforcement agency that investigates the misuse, sale, or other diversion of my controlled medicine. I will allow my provider to discuss my care with, or share a copy of, this agreement with any other treating provider, pharmacy or emergency room where I receive care.    I have read this agreement and have asked questions about anything I did not understand.    _______________________________________________________  Patient Signature - Vesna Fabian _____________________                   Date     _______________________________________________________  Provider Signature - PRUDENCE Jj   _____________________                   Date     _______________________________________________________  Witness Signature (required if provider not present while patient signing)   _____________________                   Date

## 2022-12-16 NOTE — PROGRESS NOTES
"  Assessment & Plan     ADHD (attention deficit hyperactivity disorder), combined type  Agreement is signed. She is going to continue on Vyvance.   Back working at [employer] for her sanity. Likes to be moving and up and about.      - lisdexamfetamine (VYVANSE) 60 MG capsule; Take 1 capsule (60 mg) by mouth daily      2. Bilateral arm pain  Bilateral upper arm pain from mid bicep down.  Tingling at times and painful . Doesn't seem to have a pattern and doesn't worsening after lifting or working.  At times wakes at night.  Always both arms but not always at the same time. Checking for inflammatory condition.  - CRP, inflammation; Future  - Lymes Antibodies Total ([facility])  - Aldolase; Future  - Rheumatoid factor; Future  - DNA double stranded antibodies; Future  - Anti Nuclear Sherice IgG by IFA with Reflex; Future  n  Review of external notes as documented elsewhere in note  Ordering of each unique test  Prescription drug management  15  minutes spent on the date of the encounter doing chart review, history and exam, documentation and further activities per the note       BMI:   Estimated body mass index is 30.95 kg/m  as calculated from the following:    Height as of this encounter: 1.651 m (5' 5\").    Weight as of this encounter: 84.4 kg (186 lb).       See Patient Instructions    No follow-ups on file.    PRUDENCE Jj  [facility] - ROSANNA Rodriguez   Vesna is a 37 year old, presenting for the following health issues:  Fatigue      HPI     Concern - Fatigue  Onset: 2-3 months she has been having this fatigue  Description: extreme fatigue, insomnia dehydrated even though she is drinking a lot of water  Intensity: severe  Progression of Symptoms:  same  Accompanying Signs & Symptoms: same  Previous history of similar problem: had covid about 3 weeks ago but was having fatigue about 2-3 months prior  Precipitating factors:        Worsened by: none  Alleviating factors:        Improved by: " "none  Therapies tried and outcome: had Thyroid testing done iron was low and was doing a couple of transfusions a couple of times a week.         Review of Systems   Constitutional, HEENT, cardiovascular, pulmonary, gi and gu systems are negative, except as otherwise noted.      Objective    /84 (BP Location: Left arm, Patient Position: Sitting, Cuff Size: Adult Regular)   Pulse 100   Temp 99.2  F (37.3  C) (Tympanic)   Ht 1.651 m (5' 5\")   Wt 84.4 kg (186 lb)   SpO2 98%   BMI 30.95 kg/m    Body mass index is 30.95 kg/m .  Physical Exam   GENERAL: healthy, alert and no distress  EYES: Eyes grossly normal to inspection, PERRL and conjunctivae and sclerae normal  HENT: ear canals and TM's normal, nose and mouth without ulcers or lesions  NECK: no adenopathy, no asymmetry, masses, or scars and thyroid normal to palpation  RESP: lungs clear to auscultation - no rales, rhonchi or wheezes  CV: regular rate and rhythm, normal S1 S2, no S3 or S4, no murmur, click or rub, no peripheral edema and peripheral pulses strong  ABDOMEN: soft, nontender, no hepatosplenomegaly, no masses and bowel sounds normal  MS: no gross musculoskeletal defects noted, no edema    Allied Health/Nurse Visit on 11/30/2022   Component Date Value Ref Range Status     SARS CoV2 PCR 11/30/2022 Positive (A)  Negative Final    POSITIVE: SARS-CoV-2 (COVID-19) RNA detected, presumed positive.                   "

## 2022-12-18 LAB — ALDOLASE SERPL-CCNC: 4 U/L

## 2022-12-19 LAB
ANA PAT SER IF-IMP: ABNORMAL
ANA SER QL IF: POSITIVE
ANA TITR SER IF: ABNORMAL {TITER}
B BURGDOR IGG+IGM SER QL: 0.08
DSDNA AB SER-ACNC: 1 IU/ML
RHEUMATOID FACT SER NEPH-ACNC: <6 IU/ML

## 2022-12-20 ENCOUNTER — MYC MEDICAL ADVICE (OUTPATIENT)
Dept: FAMILY MEDICINE | Facility: OTHER | Age: 37
End: 2022-12-20

## 2023-01-12 ENCOUNTER — MYC REFILL (OUTPATIENT)
Dept: FAMILY MEDICINE | Facility: OTHER | Age: 38
End: 2023-01-12

## 2023-01-12 DIAGNOSIS — F90.2 ADHD (ATTENTION DEFICIT HYPERACTIVITY DISORDER), COMBINED TYPE: ICD-10-CM

## 2023-01-13 RX ORDER — LISDEXAMFETAMINE DIMESYLATE 60 MG/1
60 CAPSULE ORAL DAILY
Qty: 30 CAPSULE | Refills: 0 | Status: SHIPPED | OUTPATIENT
Start: 2023-01-13 | End: 2023-02-06

## 2023-01-13 NOTE — TELEPHONE ENCOUNTER
lisdexamfetamine (VYVANSE) 60 MG capsule    Last Written Prescription Date:  12/16/2022  Last Fill Quantity: 30,   # refills: 0  Last Office Visit: 12/16/2022  Future Office visit:    Next 5 appointments (look out 90 days)    Feb 13, 2023 11:15 AM  (Arrive by 11:00 AM)  SHORT with PRUDENCE Braswell  [facility] [place] ([facility] [place] ) [facility]  [place] [address]  [phone]

## 2023-01-27 ENCOUNTER — MYC MEDICAL ADVICE (OUTPATIENT)
Dept: FAMILY MEDICINE | Facility: OTHER | Age: 38
End: 2023-01-27

## 2023-01-27 ENCOUNTER — ALLIED HEALTH/NURSE VISIT (OUTPATIENT)
Dept: FAMILY MEDICINE | Facility: OTHER | Age: 38
End: 2023-01-27
Attending: PHYSICIAN ASSISTANT
Payer: COMMERCIAL

## 2023-01-27 DIAGNOSIS — J02.0 STREP SORE THROAT: Primary | ICD-10-CM

## 2023-01-27 LAB — GROUP A STREP BY PCR: NOT DETECTED

## 2023-01-27 PROCEDURE — 87651 STREP A DNA AMP PROBE: CPT

## 2023-01-27 NOTE — TELEPHONE ENCOUNTER
Patient notified of provider out of office until Monday. Stated if wanted to be seen sooner that urgent care is available. Patient stated they would go to urgent care.

## 2023-01-30 DIAGNOSIS — R76.8 POSITIVE ANA (ANTINUCLEAR ANTIBODY): Primary | ICD-10-CM

## 2023-01-30 NOTE — PROGRESS NOTES
Please let her know needing a lab screening. Schedule her in and referral is made to Rheumatology.

## 2023-02-03 ENCOUNTER — LAB (OUTPATIENT)
Dept: LAB | Facility: OTHER | Age: 38
End: 2023-02-03
Payer: COMMERCIAL

## 2023-02-03 DIAGNOSIS — R76.8 POSITIVE ANA (ANTINUCLEAR ANTIBODY): ICD-10-CM

## 2023-02-03 LAB
ALBUMIN SERPL BCG-MCNC: 4.2 G/DL (ref 3.5–5.2)
ALP SERPL-CCNC: 65 U/L (ref 35–104)
ALT SERPL W P-5'-P-CCNC: 18 U/L (ref 10–35)
AST SERPL W P-5'-P-CCNC: 14 U/L (ref 10–35)
BILIRUB DIRECT SERPL-MCNC: <0.2 MG/DL (ref 0–0.3)
BILIRUB SERPL-MCNC: 0.4 MG/DL
PROT SERPL-MCNC: 6.7 G/DL (ref 6.4–8.3)
TSH SERPL DL<=0.005 MIU/L-ACNC: 1.85 UIU/ML (ref 0.3–4.2)

## 2023-02-03 PROCEDURE — 84443 ASSAY THYROID STIM HORMONE: CPT

## 2023-02-03 PROCEDURE — 86235 NUCLEAR ANTIGEN ANTIBODY: CPT

## 2023-02-03 PROCEDURE — 80076 HEPATIC FUNCTION PANEL: CPT

## 2023-02-03 PROCEDURE — 86235 NUCLEAR ANTIGEN ANTIBODY: CPT | Mod: 59

## 2023-02-03 PROCEDURE — 36415 COLL VENOUS BLD VENIPUNCTURE: CPT

## 2023-02-06 ENCOUNTER — MYC REFILL (OUTPATIENT)
Dept: FAMILY MEDICINE | Facility: OTHER | Age: 38
End: 2023-02-06

## 2023-02-06 DIAGNOSIS — F90.2 ADHD (ATTENTION DEFICIT HYPERACTIVITY DISORDER), COMBINED TYPE: ICD-10-CM

## 2023-02-07 ENCOUNTER — HOSPITAL ENCOUNTER (OUTPATIENT)
Dept: ULTRASOUND IMAGING | Facility: HOSPITAL | Age: 38
Discharge: HOME | End: 2023-02-07
Attending: PHYSICIAN ASSISTANT | Admitting: PHYSICIAN ASSISTANT
Payer: COMMERCIAL

## 2023-02-07 DIAGNOSIS — K76.0 FATTY INFILTRATION OF LIVER: ICD-10-CM

## 2023-02-07 DIAGNOSIS — R76.8 POSITIVE ANA (ANTINUCLEAR ANTIBODY): ICD-10-CM

## 2023-02-07 PROCEDURE — 76705 ECHO EXAM OF ABDOMEN: CPT

## 2023-02-07 RX ORDER — LISDEXAMFETAMINE DIMESYLATE 60 MG/1
60 CAPSULE ORAL DAILY
Qty: 30 CAPSULE | Refills: 0 | Status: SHIPPED | OUTPATIENT
Start: 2023-02-07 | End: 2023-03-06

## 2023-02-07 NOTE — TELEPHONE ENCOUNTER
lisdexamfetamine (VYVANSE) 60 MG capsule 30 capsule 0 1/13/2023     Last Office Visit: 12/16/2022    Future Office visit:    Next 5 appointments (look out 90 days)    Feb 13, 2023 11:15 AM  (Arrive by 11:00 AM)  SHORT with PRUDENCE Braswell  [facility] [place] ([facility] [place] ) [address]  [place] [address]  [phone]           Routing refill request to provider for review/approval because:

## 2023-02-08 LAB
ENA SS-A AB SER IA-ACNC: 9.7 U/ML
ENA SS-A AB SER IA-ACNC: ABNORMAL
ENA SS-B IGG SER IA-ACNC: <0.6 U/ML
ENA SS-B IGG SER IA-ACNC: NEGATIVE

## 2023-02-08 NOTE — RESULT ENCOUNTER NOTE
So far all rheum positive testing is negative except PARVIZ and that can be positive in many different types of diseases. Lets watch.  Your liver is ok. Cut back on carbs and high fructose corn syrup.

## 2023-02-09 DIAGNOSIS — M25.50 MULTIPLE JOINT PAIN: ICD-10-CM

## 2023-02-09 DIAGNOSIS — M79.602 BILATERAL ARM PAIN: Primary | ICD-10-CM

## 2023-02-09 DIAGNOSIS — M79.601 BILATERAL ARM PAIN: Primary | ICD-10-CM

## 2023-02-13 ENCOUNTER — OFFICE VISIT (OUTPATIENT)
Dept: FAMILY MEDICINE | Facility: OTHER | Age: 38
End: 2023-02-13
Attending: PHYSICIAN ASSISTANT
Payer: COMMERCIAL

## 2023-02-13 VITALS
RESPIRATION RATE: 16 BRPM | SYSTOLIC BLOOD PRESSURE: 120 MMHG | OXYGEN SATURATION: 100 % | HEART RATE: 90 BPM | TEMPERATURE: 98.3 F | BODY MASS INDEX: 29.12 KG/M2 | DIASTOLIC BLOOD PRESSURE: 90 MMHG | WEIGHT: 175 LBS

## 2023-02-13 DIAGNOSIS — I73.00 RAYNAUD'S DISEASE WITHOUT GANGRENE: Primary | ICD-10-CM

## 2023-02-13 DIAGNOSIS — R76.8 POSITIVE ANA (ANTINUCLEAR ANTIBODY): ICD-10-CM

## 2023-02-13 PROCEDURE — 99213 OFFICE O/P EST LOW 20 MIN: CPT | Performed by: PHYSICIAN ASSISTANT

## 2023-02-13 RX ORDER — VERAPAMIL HYDROCHLORIDE 120 MG/1
120 TABLET, FILM COATED, EXTENDED RELEASE ORAL AT BEDTIME
Qty: 30 TABLET | Refills: 3 | Status: SHIPPED | OUTPATIENT
Start: 2023-02-13 | End: 2023-07-31

## 2023-02-13 ASSESSMENT — ANXIETY QUESTIONNAIRES
GAD7 TOTAL SCORE: 0
5. BEING SO RESTLESS THAT IT IS HARD TO SIT STILL: NOT AT ALL
4. TROUBLE RELAXING: NOT AT ALL
6. BECOMING EASILY ANNOYED OR IRRITABLE: NOT AT ALL
7. FEELING AFRAID AS IF SOMETHING AWFUL MIGHT HAPPEN: NOT AT ALL
3. WORRYING TOO MUCH ABOUT DIFFERENT THINGS: NOT AT ALL
GAD7 TOTAL SCORE: 0
1. FEELING NERVOUS, ANXIOUS, OR ON EDGE: NOT AT ALL
2. NOT BEING ABLE TO STOP OR CONTROL WORRYING: NOT AT ALL

## 2023-02-13 ASSESSMENT — PAIN SCALES - GENERAL: PAINLEVEL: MODERATE PAIN (4)

## 2023-02-13 ASSESSMENT — PATIENT HEALTH QUESTIONNAIRE - PHQ9: SUM OF ALL RESPONSES TO PHQ QUESTIONS 1-9: 0

## 2023-02-13 NOTE — PROGRESS NOTES
Assessment & Plan     Raynaud's disease without gangrene  Has advanced issues with sleep . Has had a sleep study and nap study. Not getting into REM.  States is in pain all the time.  Feels very relieved that something might be able to be done on her pain she has.    - verapamil ER (CALAN-SR) 120 MG CR tablet; Take 1 tablet (120 mg) by mouth At Bedtime    Positive PARVIZ (antinuclear antibody)  Positive and SSRo. Equivocal.        Review of external notes as documented elsewhere in note  Ordering of each unique test  Prescription drug management  10  minutes spent on the date of the encounter doing chart review, history and exam, documentation and further activities per the note       See Patient Instructions    No follow-ups on file.    PRUDENCE Jj  [facility] - ROSANNA Malagon is a 37 year old, presenting for the following health issues:  Thyroid Problem      HPI     Hypothyroidism Follow-up    Since last visit, patient describes the following symptoms: Weight stable, no hair loss, no skin changes, no constipation, no loose stools, weight loss of 10 lbs, dry skin, constipation, loose stools, tremors, anxiety, depression, fatigue and hair loss      TSH   Date Value Ref Range Status   02/03/2023 1.85 0.30 - 4.20 uIU/mL Final   01/12/2022 1.86 0.40 - 4.00 mU/L Final   12/09/2020 1.71 0.40 - 4.00 mU/L Final         Review of Systems   Constitutional, HEENT, cardiovascular, pulmonary, gi and gu systems are negative, except as otherwise noted.      Objective    BP (!) 120/90 (BP Location: Left arm, Patient Position: Sitting, Cuff Size: Adult Regular)   Pulse 90   Temp 98.3  F (36.8  C) (Tympanic)   Resp 16   Wt 79.4 kg (175 lb)   LMP 01/30/2022   SpO2 100%   BMI 29.12 kg/m    Body mass index is 29.12 kg/m .  Physical Exam   GENERAL: healthy, alert and no distress  NECK: no adenopathy, no asymmetry, masses, or scars and thyroid normal to palpation  RESP: lungs clear to  "auscultation - no rales, rhonchi or wheezes  CV: regular rate and rhythm, normal S1 S2, no S3 or S4, no murmur, click or rub, no peripheral edema and peripheral pulses strong  ABDOMEN: soft, nontender, no hepatosplenomegaly, no masses and bowel sounds normal. review of her ultrasound. Fatty liver starting.  MS: both hands are swollen and red. Blanching. And cold   SKIN: no open lesion on her hands. Has some new changes and \"acne\" on her face.     Lab on 02/03/2023   Component Date Value Ref Range Status     SSA Sherice IgG Instrument Value 02/03/2023 9.7 (H)  <7.0 U/mL Final     SSA (Ro) Antibody IgG 02/03/2023 Equivocal (A)  Negative Final     SSB Sherice IgG Instrument Value 02/03/2023 <0.6  <7.0 U/mL Final     SSB (La) Antibody IgG 02/03/2023 Negative  Negative Final     TSH 02/03/2023 1.85  0.30 - 4.20 uIU/mL Final     Protein Total 02/03/2023 6.7  6.4 - 8.3 g/dL Final     Albumin 02/03/2023 4.2  3.5 - 5.2 g/dL Final     Bilirubin Total 02/03/2023 0.4  <=1.2 mg/dL Final     Alkaline Phosphatase 02/03/2023 65  35 - 104 U/L Final     AST 02/03/2023 14  10 - 35 U/L Final     ALT 02/03/2023 18  10 - 35 U/L Final     Bilirubin Direct 02/03/2023 <0.20  0.00 - 0.30 mg/dL Final                   "

## 2023-03-06 ENCOUNTER — MYC REFILL (OUTPATIENT)
Dept: FAMILY MEDICINE | Facility: OTHER | Age: 38
End: 2023-03-06

## 2023-03-06 DIAGNOSIS — F90.2 ADHD (ATTENTION DEFICIT HYPERACTIVITY DISORDER), COMBINED TYPE: ICD-10-CM

## 2023-03-06 RX ORDER — LISDEXAMFETAMINE DIMESYLATE 60 MG/1
60 CAPSULE ORAL DAILY
Qty: 30 CAPSULE | Refills: 0 | Status: SHIPPED | OUTPATIENT
Start: 2023-03-06 | End: 2023-03-27

## 2023-03-06 NOTE — TELEPHONE ENCOUNTER
Yana      Last Written Prescription Date:  2.7.23  Last Fill Quantity: #30,   # refills: 0  Last Office Visit: 2.13.23  Future Office visit:       Routing refill request to provider for review/approval because:  Drug not on the [facility], P or [facility] refill protocol or controlled substance

## 2023-03-27 ENCOUNTER — MYC REFILL (OUTPATIENT)
Dept: FAMILY MEDICINE | Facility: OTHER | Age: 38
End: 2023-03-27

## 2023-03-27 DIAGNOSIS — F90.2 ADHD (ATTENTION DEFICIT HYPERACTIVITY DISORDER), COMBINED TYPE: ICD-10-CM

## 2023-03-27 RX ORDER — LISDEXAMFETAMINE DIMESYLATE 60 MG/1
60 CAPSULE ORAL DAILY
Qty: 30 CAPSULE | Refills: 0 | Status: SHIPPED | OUTPATIENT
Start: 2023-03-27 | End: 2023-04-19

## 2023-04-19 ENCOUNTER — MYC REFILL (OUTPATIENT)
Dept: FAMILY MEDICINE | Facility: OTHER | Age: 38
End: 2023-04-19

## 2023-04-19 DIAGNOSIS — F90.2 ADHD (ATTENTION DEFICIT HYPERACTIVITY DISORDER), COMBINED TYPE: ICD-10-CM

## 2023-04-19 RX ORDER — LISDEXAMFETAMINE DIMESYLATE 60 MG/1
60 CAPSULE ORAL DAILY
Qty: 30 CAPSULE | Refills: 0 | Status: SHIPPED | OUTPATIENT
Start: 2023-04-19 | End: 2023-05-16

## 2023-04-19 NOTE — TELEPHONE ENCOUNTER
Yana      Last Written Prescription Date:  3.27.23  Last Fill Quantity: #30,   # refills: 0  Last Office Visit: 2.13.23  Future Office visit:       Routing refill request to provider for review/approval because:  Drug not on the [facility], P or [facility] refill protocol or controlled substance

## 2023-04-29 ENCOUNTER — HEALTH MAINTENANCE LETTER (OUTPATIENT)
Age: 38
End: 2023-04-29

## 2023-05-16 ENCOUNTER — MYC REFILL (OUTPATIENT)
Dept: FAMILY MEDICINE | Facility: OTHER | Age: 38
End: 2023-05-16

## 2023-05-16 DIAGNOSIS — F90.2 ADHD (ATTENTION DEFICIT HYPERACTIVITY DISORDER), COMBINED TYPE: ICD-10-CM

## 2023-05-17 RX ORDER — LISDEXAMFETAMINE DIMESYLATE 60 MG/1
60 CAPSULE ORAL DAILY
Qty: 30 CAPSULE | Refills: 0 | Status: SHIPPED | OUTPATIENT
Start: 2023-05-17 | End: 2023-06-19

## 2023-05-17 NOTE — TELEPHONE ENCOUNTER
Yana      Last Written Prescription Date:  4.19.23  Last Fill Quantity: #30,   # refills: 0  Last Office Visit: 2.13.23  Future Office visit:       Routing refill request to provider for review/approval because:  Drug not on the [facility], P or [facility] refill protocol or controlled substance

## 2023-06-19 ENCOUNTER — MYC REFILL (OUTPATIENT)
Dept: FAMILY MEDICINE | Facility: OTHER | Age: 38
End: 2023-06-19

## 2023-06-19 DIAGNOSIS — F90.2 ADHD (ATTENTION DEFICIT HYPERACTIVITY DISORDER), COMBINED TYPE: ICD-10-CM

## 2023-06-22 RX ORDER — LISDEXAMFETAMINE DIMESYLATE 60 MG/1
60 CAPSULE ORAL DAILY
Qty: 30 CAPSULE | Refills: 0 | Status: SHIPPED | OUTPATIENT
Start: 2023-06-22 | End: 2023-07-17

## 2023-06-22 NOTE — TELEPHONE ENCOUNTER
lisdexamfetamine (VYVANSE) 60 MG capsule      Last Written Prescription Date:  5-17-23  Last Fill Quantity: 30,   # refills: 0  Last Office Visit: 2-13-23  Future Office visit:       Routing refill request to provider for review/approval because:  Drug not on the [facility], [facility] or [facility] refill protocol or controlled substance

## 2023-07-17 ENCOUNTER — MYC REFILL (OUTPATIENT)
Dept: FAMILY MEDICINE | Facility: OTHER | Age: 38
End: 2023-07-17

## 2023-07-17 DIAGNOSIS — F90.2 ADHD (ATTENTION DEFICIT HYPERACTIVITY DISORDER), COMBINED TYPE: ICD-10-CM

## 2023-07-17 NOTE — TELEPHONE ENCOUNTER
Lisdexamfetamine 60mg  Last Written Prescription Date:  6/22/2023  Last Fill Quantity: 30 capusles,   # refills: 0  Last Office Visit: 2/13/2023  Future Office visit:       Routing refill request to provider for review/approval because:  Drug not on the [facility], P or [facility] refill protocol or controlled substance

## 2023-07-19 ENCOUNTER — HOSPITAL ENCOUNTER (EMERGENCY)
Facility: HOSPITAL | Age: 38
Discharge: HOME | End: 2023-07-19
Attending: NURSE PRACTITIONER | Admitting: NURSE PRACTITIONER
Payer: COMMERCIAL

## 2023-07-19 ENCOUNTER — APPOINTMENT (OUTPATIENT)
Dept: GENERAL RADIOLOGY | Facility: HOSPITAL | Age: 38
End: 2023-07-19
Attending: NURSE PRACTITIONER
Payer: COMMERCIAL

## 2023-07-19 VITALS
HEART RATE: 77 BPM | SYSTOLIC BLOOD PRESSURE: 133 MMHG | DIASTOLIC BLOOD PRESSURE: 92 MMHG | RESPIRATION RATE: 20 BRPM | OXYGEN SATURATION: 98 % | TEMPERATURE: 98.3 F

## 2023-07-19 DIAGNOSIS — F41.9 ANXIETY: ICD-10-CM

## 2023-07-19 LAB
ALBUMIN SERPL BCG-MCNC: 4.2 G/DL (ref 3.5–5.2)
ALBUMIN UR-MCNC: NEGATIVE MG/DL
ALP SERPL-CCNC: 72 U/L (ref 35–104)
ALT SERPL W P-5'-P-CCNC: 34 U/L (ref 0–50)
ANION GAP SERPL CALCULATED.3IONS-SCNC: 12 MMOL/L (ref 7–15)
APPEARANCE UR: CLEAR
AST SERPL W P-5'-P-CCNC: 24 U/L (ref 0–45)
BACTERIA #/AREA URNS HPF: ABNORMAL /HPF
BASOPHILS # BLD AUTO: 0.1 10E3/UL (ref 0–0.2)
BASOPHILS NFR BLD AUTO: 1 %
BILIRUB SERPL-MCNC: 0.4 MG/DL
BILIRUB UR QL STRIP: NEGATIVE
BUN SERPL-MCNC: 10.3 MG/DL (ref 6–20)
CALCIUM SERPL-MCNC: 9 MG/DL (ref 8.6–10)
CHLORIDE SERPL-SCNC: 104 MMOL/L (ref 98–107)
COLOR UR AUTO: ABNORMAL
CREAT SERPL-MCNC: 0.62 MG/DL (ref 0.51–0.95)
DEPRECATED HCO3 PLAS-SCNC: 23 MMOL/L (ref 22–29)
EOSINOPHIL # BLD AUTO: 0.1 10E3/UL (ref 0–0.7)
EOSINOPHIL NFR BLD AUTO: 2 %
ERYTHROCYTE [DISTWIDTH] IN BLOOD BY AUTOMATED COUNT: 12.8 % (ref 10–15)
GFR SERPL CREATININE-BSD FRML MDRD: >90 ML/MIN/1.73M2
GLUCOSE SERPL-MCNC: 143 MG/DL (ref 70–99)
GLUCOSE UR STRIP-MCNC: NEGATIVE MG/DL
HCT VFR BLD AUTO: 43.5 % (ref 35–47)
HGB BLD-MCNC: 14.8 G/DL (ref 11.7–15.7)
HGB UR QL STRIP: NEGATIVE
HOLD SPECIMEN: NORMAL
IMM GRANULOCYTES # BLD: 0.1 10E3/UL
IMM GRANULOCYTES NFR BLD: 1 %
KETONES UR STRIP-MCNC: NEGATIVE MG/DL
LEUKOCYTE ESTERASE UR QL STRIP: NEGATIVE
LYMPHOCYTES # BLD AUTO: 1.7 10E3/UL (ref 0.8–5.3)
LYMPHOCYTES NFR BLD AUTO: 24 %
MAGNESIUM SERPL-MCNC: 2 MG/DL (ref 1.7–2.3)
MCH RBC QN AUTO: 30.6 PG (ref 26.5–33)
MCHC RBC AUTO-ENTMCNC: 34 G/DL (ref 31.5–36.5)
MCV RBC AUTO: 90 FL (ref 78–100)
MONOCYTES # BLD AUTO: 0.3 10E3/UL (ref 0–1.3)
MONOCYTES NFR BLD AUTO: 4 %
NEUTROPHILS # BLD AUTO: 4.6 10E3/UL (ref 1.6–8.3)
NEUTROPHILS NFR BLD AUTO: 68 %
NITRATE UR QL: NEGATIVE
NRBC # BLD AUTO: 0 10E3/UL
NRBC BLD AUTO-RTO: 0 /100
PH UR STRIP: 7.5 [PH] (ref 4.7–8)
PLATELET # BLD AUTO: 323 10E3/UL (ref 150–450)
POTASSIUM SERPL-SCNC: 3.8 MMOL/L (ref 3.4–5.3)
PROT SERPL-MCNC: 6.7 G/DL (ref 6.4–8.3)
RBC # BLD AUTO: 4.83 10E6/UL (ref 3.8–5.2)
RBC URINE: 1 /HPF
SODIUM SERPL-SCNC: 139 MMOL/L (ref 136–145)
SP GR UR STRIP: 1.01 (ref 1–1.03)
SQUAMOUS EPITHELIAL: 0 /HPF
TROPONIN T SERPL HS-MCNC: <6 NG/L
UROBILINOGEN UR STRIP-MCNC: NORMAL MG/DL
WBC # BLD AUTO: 6.8 10E3/UL (ref 4–11)
WBC URINE: 2 /HPF

## 2023-07-19 PROCEDURE — 71046 X-RAY EXAM CHEST 2 VIEWS: CPT

## 2023-07-19 PROCEDURE — 93010 ELECTROCARDIOGRAM REPORT: CPT | Performed by: HOSPITALIST

## 2023-07-19 PROCEDURE — 36415 COLL VENOUS BLD VENIPUNCTURE: CPT | Performed by: NURSE PRACTITIONER

## 2023-07-19 PROCEDURE — 81003 URINALYSIS AUTO W/O SCOPE: CPT | Performed by: NURSE PRACTITIONER

## 2023-07-19 PROCEDURE — 80053 COMPREHEN METABOLIC PANEL: CPT | Performed by: NURSE PRACTITIONER

## 2023-07-19 PROCEDURE — 93005 ELECTROCARDIOGRAM TRACING: CPT

## 2023-07-19 PROCEDURE — 99285 EMERGENCY DEPT VISIT HI MDM: CPT | Mod: 25

## 2023-07-19 PROCEDURE — 84484 ASSAY OF TROPONIN QUANT: CPT | Performed by: NURSE PRACTITIONER

## 2023-07-19 PROCEDURE — 83735 ASSAY OF MAGNESIUM: CPT | Performed by: NURSE PRACTITIONER

## 2023-07-19 PROCEDURE — 85025 COMPLETE CBC W/AUTO DIFF WBC: CPT | Performed by: NURSE PRACTITIONER

## 2023-07-19 PROCEDURE — 99284 EMERGENCY DEPT VISIT MOD MDM: CPT | Performed by: NURSE PRACTITIONER

## 2023-07-19 RX ORDER — LISDEXAMFETAMINE DIMESYLATE 60 MG/1
60 CAPSULE ORAL DAILY
Qty: 30 CAPSULE | Refills: 0 | Status: SHIPPED | OUTPATIENT
Start: 2023-07-19 | End: 2023-08-15

## 2023-07-19 ASSESSMENT — ACTIVITIES OF DAILY LIVING (ADL): ADLS_ACUITY_SCORE: 36

## 2023-07-19 ASSESSMENT — ENCOUNTER SYMPTOMS
HEMATURIA: 1
CONSTITUTIONAL NEGATIVE: 1
DYSURIA: 0
FREQUENCY: 0
CHEST TIGHTNESS: 1
NERVOUS/ANXIOUS: 1
FLANK PAIN: 0
ALLERGIC/IMMUNOLOGIC NEGATIVE: 1
GASTROINTESTINAL NEGATIVE: 1
HEMATOLOGIC/LYMPHATIC NEGATIVE: 1
ENDOCRINE NEGATIVE: 1
MUSCULOSKELETAL NEGATIVE: 1
CARDIOVASCULAR NEGATIVE: 1
EYES NEGATIVE: 1

## 2023-07-19 NOTE — ED TRIAGE NOTES
"Pt presents with c/o syncopal episode last week, brown urine onset 1 month, \"throbbing all over my body and everything is bright\" onset 1 week. Pt has several other complaints, but appears in no acute distress. Denies any recent illness.       "

## 2023-07-19 NOTE — DISCHARGE INSTRUCTIONS
Follow-up with your primary care provider for reevaluation.  Contact your primary care provider if you have any questions or concerns.  Do not hesitate to return to the ER if any new or worsening symptoms.     Please read the attached instructions (if any).  They highlight more specific treatments and interventions for you at home.              Thank you for letting me participate in your care and wish you a fast and uneventful recovery,    Regan STEPHEN CNP    Do not hesitate to contact me with questions or concerns.  [email or web address]  [email or web address]

## 2023-07-19 NOTE — ED PROVIDER NOTES
History     Chief Complaint   Patient presents with     Syncope     HPI   Vesna Fabian is a 38 year old individual with history of major depression, anxiety, chronic fatigue, bipolar disorder, comes in with multiple complaints.   It is difficult to get information from patient but states has been having brown urine for the past month.  Denies any dysuria with this.  Denies any fever or chills.  Denies any back pain.  States that she does have history of hysterectomy in the past so denies pregnancy.   Patient is also concerned because she apparently had a syncopal episode last week.  Unable to get much information from patient in regards to this.  Patient states patient states that she is just worried.   Patient currently denies any chest pain or shortness of breath.  Denies any drug or alcohol use.    Allergies:  No Known Allergies    Problem List:    Patient Active Problem List    Diagnosis Date Noted     S/P hysterectomy 04/06/2022     Priority: Medium     LAVH       Decreased ferritin 01/08/2021     Priority: Medium     Ligament tear 06/24/2020     Priority: Medium     Added automatically from request for surgery [id number]       ACP (advance care planning) 03/10/2016     Priority: Medium     Advance Care Planning 3/10/2016: ACP Review of Chart / Resources Provided:  Reviewed chart for advance care plan.  Vesna Fabian has been provided information and resources to begin or update their advance care plan.  Added by Kenisha Chilel             NO SHOW 06/18/2015     Priority: Medium     No shows for Dr. Valdez : 5/15/13; 4/29/15         ASCUS (atypical squamous cells of undetermined significance) on Pap smear 04/29/2014     Priority: Medium     Encounter for routine gynecological examination 04/21/2014     Priority: Medium     Problem list name updated by automated process. Provider to review       Decreased libido 04/21/2014     Priority: Medium     Sensate focus       Fatigue 04/21/2014     Priority: Medium      Smoker 04/21/2014     Priority: Medium     Ready to quit. Only 3 cigs in a week       Family planning 04/22/2013     Priority: Medium     Moderate major depression (H) 04/22/2013     Priority: Medium     Denies suicidal or homicidal ideations at this time. Working with RAÚL Molina       Anxiety 04/22/2013     Priority: Medium     Chronic fatigue 04/22/2013     Priority: Medium     Bipolar disorder, unspecified (H) 08/09/2012     Priority: Medium        Past Medical History:    Past Medical History:   Diagnosis Date     Attention deficit disorder without mention of hyperactivity 5/31/2000     Bipolar affective disorder 8/9/2012     Bulimia 8/9/2012     Self-injurious behavior 8/9/2012     Suicide attempt 8/9/2012       Past Surgical History:    Past Surgical History:   Procedure Laterality Date     cyst ovary      LT     CYSTOSCOPY N/A 04/06/2022    Procedure: Cystoscopy;  Surgeon: Juan Alberto Cain MD;  Location: [facility]     HYSTERECTOMY, PAP NO LONGER INDICATED N/A 04/06/2022    Cervix removed.     LAPAROSCOPIC ASSISTED HYSTERECTOMY VAGINAL N/A 04/06/2022    Procedure: LAPAROSCOPIC ASSISTED VAGINAL HYSTERECTOMY;  Surgeon: Juan Alberto Cain MD;  Location: [facility]     MAMMOPLASTY REDUCTION  01/01/2011     REPAIR TENDON PATELLA Left 07/10/2020    Procedure: Left knee patellorfemoral ligament repair;  Surgeon: Melchor Perkins DO;  Location: [facility]     SALPINGECTOMY Bilateral 04/06/2022    Procedure: BILATERAL SALPINGECTOMY;  Surgeon: Juan Alberto Cain MD;  Location: [facility]     wisdom teeth removed         Family History:    Family History   Problem Relation Age of Onset     Cancer Maternal Grandmother         pancreatic cancer; cause of death     Cancer Father         rectal     Colon Cancer Father      Heart Disease Paternal Grandmother         heart disease     Hypertension Mother        Social History:  Marital Status:  Single [1]  Social History     Tobacco Use     Smoking status: Former     Years: 10.00     Types:  Cigarettes     Quit date: 1/19/2020     Years since quitting: 3.4     Smokeless tobacco: Never     Tobacco comments:     quit 2011. longest tobacco free 1 year. no passive exposure   Vaping Use     Vaping Use: Never used   Substance Use Topics     Alcohol use: Yes     Comment: rarely     Drug use: No        Medications:    lisdexamfetamine (VYVANSE) 60 MG capsule  verapamil ER (CALAN-SR) 120 MG CR tablet          Review of Systems   Constitutional: Negative.    HENT: Negative.    Eyes: Negative.    Respiratory: Positive for chest tightness.    Cardiovascular: Negative.    Gastrointestinal: Negative.    Endocrine: Negative.    Genitourinary: Positive for hematuria. Negative for dysuria, flank pain, frequency, pelvic pain, vaginal bleeding, vaginal discharge and vaginal pain.   Musculoskeletal: Negative.    Skin: Negative.    Allergic/Immunologic: Negative.    Neurological: Positive for syncope.   Hematological: Negative.    Psychiatric/Behavioral: The patient is nervous/anxious.        Physical Exam     Vitals:    07/19/23 1303 07/19/23 1453 07/19/23 1455   BP: (!) 162/117 133/92    Pulse: 98 77    Resp: 16     Temp: 98.3  F (36.8  C)     TempSrc: Tympanic     SpO2: 100%  98%       Physical Exam  GENERAL APPEARANCE:  The patient is a 38 year old well-developed, well-nourished individual that is anxious and tearful upon arrival.  NECK:  Supple.  Trachea is midline.  No carotid bruits present on auscultation.  LUNGS:  Breathing is easy.  Breath sounds are equal and clear bilaterally.  No wheezes, rhonchi, or rales.  HEART:  Regular rate and rhythm with normal S1 and S2.  No murmurs, gallops, or rubs.  ABDOMEN:  Soft and flat.  No mass, guarding, or rebound.  No organomegaly or hernia.  Left lower quadrant tenderness to palpation.  Bowel sounds are present.  No CVA tenderness or flank mass.  No abdominal bruits or thrills present upon auscultation/palpation.  EXTREMITIES:  No cyanosis, clubbing, or edema.  Pedal and  post-tibial pulses are 2+ bilaterally.  Radial pulses are 2+ bilaterally.  NEUROLOGIC:  No focal sensory or motor deficits are noted.  PSYCHIATRIC:  The patient is awake, alert, and oriented x4.  Recent and remote memory is intact.  Anxious and tearful.  Patient is cooperative with history and physical exam.  SKIN:  Warm, dry, and well perfused.  Good turgor.  No lesions, nodules, or rashes are noted.  No bruising noted.      Comment: Discrepancies between my note and notes on behalf of the nursing team or other care providers are secondary to my findings reflecting my physical examination and questioning of the patient.  Any conflicting information provided is not in line with my examination of the patient.      ED Course              ED Course as of 07/19/23 1459   Wed Jul 19, 2023   1313 In to see patient and history/physical completed.    1330 EKG 12-lead, tracing only  No STEMI noted   1441 Lab works are benign.  ECG normal.  Chest x-ray shows no abnormalities.  Patient likely having anxiety related problems at this time.  Will discharge home to follow-up with PCP.  Patient in agreement.            ECG:    ECG competed at 1326 and personally reviewed at 1330 showing sinus rhythm with ventricular rate of 87.  Normal axis.  Normal ECG.  When compared to ECG from 3/9/2018, no significant changes noted.         Results for orders placed or performed during the hospital encounter of 07/19/23 (from the past 24 hour(s))   Comprehensive metabolic panel   Result Value Ref Range    Sodium 139 136 - 145 mmol/L    Potassium 3.8 3.4 - 5.3 mmol/L    Chloride 104 98 - 107 mmol/L    Carbon Dioxide (CO2) 23 22 - 29 mmol/L    Anion Gap 12 7 - 15 mmol/L    Urea Nitrogen 10.3 6.0 - 20.0 mg/dL    Creatinine 0.62 0.51 - 0.95 mg/dL    Calcium 9.0 8.6 - 10.0 mg/dL    Glucose 143 (H) 70 - 99 mg/dL    Alkaline Phosphatase 72 35 - 104 U/L    AST 24 0 - 45 U/L    ALT 34 0 - 50 U/L    Protein Total 6.7 6.4 - 8.3 g/dL    Albumin 4.2 3.5 -  5.2 g/dL    Bilirubin Total 0.4 <=1.2 mg/dL    GFR Estimate >90 >60 mL/min/1.73m2   CBC with platelets differential    Narrative    The following orders were created for panel order CBC with platelets differential.  Procedure                               Abnormality         Status                     ---------                               -----------         ------                     CBC with platelets and d...[[id number]]                      Final result                 Please view results for these tests on the individual orders.   Magnesium   Result Value Ref Range    Magnesium 2.0 1.7 - 2.3 mg/dL   Troponin T, High Sensitivity   Result Value Ref Range    Troponin T, High Sensitivity <6 <=14 ng/L   CBC with platelets and differential   Result Value Ref Range    WBC Count 6.8 4.0 - 11.0 10e3/uL    RBC Count 4.83 3.80 - 5.20 10e6/uL    Hemoglobin 14.8 11.7 - 15.7 g/dL    Hematocrit 43.5 35.0 - 47.0 %    MCV 90 78 - 100 fL    MCH 30.6 26.5 - 33.0 pg    MCHC 34.0 31.5 - 36.5 g/dL    RDW 12.8 10.0 - 15.0 %    Platelet Count 323 150 - 450 10e3/uL    % Neutrophils 68 %    % Lymphocytes 24 %    % Monocytes 4 %    % Eosinophils 2 %    % Basophils 1 %    % Immature Granulocytes 1 %    NRBCs per 100 WBC 0 <1 /100    Absolute Neutrophils 4.6 1.6 - 8.3 10e3/uL    Absolute Lymphocytes 1.7 0.8 - 5.3 10e3/uL    Absolute Monocytes 0.3 0.0 - 1.3 10e3/uL    Absolute Eosinophils 0.1 0.0 - 0.7 10e3/uL    Absolute Basophils 0.1 0.0 - 0.2 10e3/uL    Absolute Immature Granulocytes 0.1 <=0.4 10e3/uL    Absolute NRBCs 0.0 10e3/uL   Extra Tube    Narrative    The following orders were created for panel order Extra Tube.  Procedure                               Abnormality         Status                     ---------                               -----------         ------                     Extra Blue Top Tube[[id number]]                              Final result               Extra Red Top Tube[[id number]]                                Final result               Extra Heparinized Syringe[[id number]]                        Final result                 Please view results for these tests on the individual orders.   Extra Blue Top Tube   Result Value Ref Range    Hold Specimen JIC    Extra Red Top Tube   Result Value Ref Range    Hold Specimen JIC    Extra Heparinized Syringe   Result Value Ref Range    Hold Specimen JIC    XR Chest 2 Views    Narrative    PROCEDURE:  XR CHEST 2 VIEWS    HISTORY: Syncope, .    COMPARISON:  1/12/2021    FINDINGS:  The cardiomediastinal contours are normal. The trachea is midline.  No focal consolidation, effusion or pneumothorax.    No suspicious osseous lesion or subdiaphragmatic free air.      Impression    IMPRESSION:      Stable appearance of the chest.    GRAY BARROW MD         SYSTEM ID:  [id number]   UA with Microscopic reflex to Culture    Specimen: Urine, Midstream   Result Value Ref Range    Color Urine Straw Colorless, Straw, Light Yellow, Yellow    Appearance Urine Clear Clear    Glucose Urine Negative Negative mg/dL    Bilirubin Urine Negative Negative    Ketones Urine Negative Negative mg/dL    Specific Gravity Urine 1.007 1.003 - 1.035    Blood Urine Negative Negative    pH Urine 7.5 4.7 - 8.0    Protein Albumin Urine Negative Negative mg/dL    Urobilinogen Urine Normal Normal, 2.0 mg/dL    Nitrite Urine Negative Negative    Leukocyte Esterase Urine Negative Negative    Bacteria Urine Few (A) None Seen /HPF    RBC Urine 1 <=2 /HPF    WBC Urine 2 <=5 /HPF    Squamous Epithelials Urine 0 <=1 /HPF    Narrative    Urine Culture not indicated       Medications - No data to display    Assessments & Plan (with Medical Decision Making)     I have reviewed the nursing notes.    I have reviewed the findings, diagnosis, plan and need for follow up with the patient.      Summary:  Patient presents to the ER today for brown/discolored urine and syncope.  Potential diagnosis which have been considered and  evaluated include UTI, pyelonephritis, pyelonephritis, MI/NSTEMI, STEMI, arrhythmia, sepsis, anemia, as well as others. Many of these have been excluded using the various modalities and assessment as noted on the chart. At the present time, the diagnosis given seems to be the most likely anxiety about health.  Upon arrival, vitals signs show blood pressure 162/117 with a pulse of 98.  Temperature 36.8 C.  Respirations 16 with oxygenation of 98% on room air.  The patient is alert but very anxious and tearful upon arrival.  Cardiac and respiratory examination normal.  Does have left lower quadrant tenderness to palpation but no hernia or mass.  No CVA tenderness.  ECG obtained showing no acute abnormalities.  Troponin is.  Troponin negative making ACS unlikely.  Lab work shows no acute abdomen shows no acute abnormalities.  UA negative.  Chest x-ray personally reviewed showing no acute cardiopulmonary abnormalities.  No known acute life-threatening abnormalities are noted on examination today.  Patient is high anxiety so possibly anxiety reaction/panic disorder that is the cause of patient's symptoms.  No abnormalities in the urine.  For this reason we will discharge home to follow-up with PCP.  Return to ER if new or worsening symptoms.  Patient verbalized understand agrees with plan of care.  Patient discharged home.        Impression and plan discussed with patient. Questions answered, concerns addressed, indications for urgent re-evaluation reviewed, and  given. Patient/Parent/Caregiver agree with treatment plan and have no further questions at this time.  AVS provided at discharge.    This note was created by the Dragon Voice Dictation System. Inadvertent typographical errors, due to software recognition problems, may still exist.        New Prescriptions    No medications on file       Final diagnoses:   Anxiety       7/19/2023   [facility]     Regan Rodríguez APRN CNP  07/19/23 [id number]

## 2023-07-26 NOTE — PROGRESS NOTES
Rheumatology Clinic Visit  [facility]  [place]     Vesna Fabian MRN# [id number]   YOB: 1985 Age: 38 year old   Date of Visit: 07/31/2023  Primary care provider: Dixie Chun          Assessment and Plan:     1.  Positive PARVIZ  2.  Multiple joint pain  3.  Fatigue    Patient presents today for an initial evaluation of joint pain.  Upon further evaluation with her primary care provider she was found to have a positive PARVIZ.  She states that she does have some joint pain but the significant issue has been her fatigue.  She states that it is quite prohibitive at times.  Physical examination today did not show any active synovitis, dactylitis, tenosynovitis or enthesopathy.  She did have some tenderness to palpation of the left shoulder musculature and around the left SI joint.  No skin rashes or mucositis noted.  Previous laboratory evaluations and imaging studies were reviewed, results below.    Discussed with the patient that a positive PARVIZ is of unknown significance.  10% of the healthy population can have a positive PARVIZ.  We do get false positives with this test as well.  Discussed that a positive PARVIZ can be associated with nonrheumatological autoimmune disorder such as thyroiditis.  It can also be associated with rheumatological autoimmune disorder such as connective tissue disorders or scleroderma.  A positive PARVIZ has also been known to be associated with some viral and bacterial infections.  Given the symptoms that the patient is having as well as a equivocal SSA, would recommend further evaluation.  We did discuss the potential for Sjogren syndrome.  Will check further antibody testing if this returns normal/negative, consider referral to ENT for minor lip biopsy.  I will contact the patient with the results of her evaluation once it is complete.      Plan:     Schedule follow-up with Charlotte Frazier PA-C as needed  Labs: complement levels, JUAN panel, antiphospholipid  "panel,  thyroid peroxidase antibody, Scl-70, Centromere antibody, CCP antibody, CRP and Sed Rate, vitamin D  Other: consider referral to ENT for a minor lip biopsy    Charlotte Frazier, LD  Rheumatology         History of Present Illness:   Vesna Fabian presents for evaluation of joint pain,  positive PARVIZ, positive SSA    She states that for over 10 years, she has had pain. She has significant fatigue. She reports it as being \"paralyzing\" at times. She states that her feet and hand \"go white\". She feels that after her hysterectomy things have gotten worse. She has dry eyes. She will get watering and reports that this burns. She states that last winter she had a \"full body\" wash last winter. She states that it was neck down to her lower abdomen and back. She reports having had fevers and headaches often. The fevers are associated with illness. She reports having had hair loss. No history of pericarditis or pleuritis. No history of delirium  or psychosis. She had 2 miscarriages. No history of blood clots or seizures. She has heartburn often. No trouble with swallowing food/medications. She does report \"choking on air\". She reports that she was diagnosed with Raynaud's in the past. She state that she tried a blood pressure medication and it caused migraines. She manages conservatively now. No history ulcerations.     Aunt with lupus.          Review of Systems:     Constitutional: negative  Skin: negative  Eyes: negative  Ears/Nose/Throat: negative  Respiratory: No shortness of breath, dyspnea on exertion, cough, or hemoptysis  Cardiovascular: negative  Gastrointestinal: negative  Genitourinary: negative  Musculoskeletal: as above  Neurologic: negative  Psychiatric: negative  Hematologic/Lymphatic/Immunologic: negative  Endocrine: negative         Active Problem List:     Patient Active Problem List    Diagnosis Date Noted    S/P hysterectomy 04/06/2022     Priority: Medium     LAVH      Decreased ferritin 01/08/2021 "     Priority: Medium    Ligament tear 06/24/2020     Priority: Medium     Added automatically from request for surgery [id number]      ACP (advance care planning) 03/10/2016     Priority: Medium     Advance Care Planning 3/10/2016: ACP Review of Chart / Resources Provided:  Reviewed chart for advance care plan.  Vesna Fabian has been provided information and resources to begin or update their advance care plan.  Added by Kenisha Chilel            NO SHOW 06/18/2015     Priority: Medium     No shows for Dr. Valdez : 5/15/13; 4/29/15        ASCUS (atypical squamous cells of undetermined significance) on Pap smear 04/29/2014     Priority: Medium    Encounter for routine gynecological examination 04/21/2014     Priority: Medium     Problem list name updated by automated process. Provider to review      Decreased libido 04/21/2014     Priority: Medium     Sensate focus      Fatigue 04/21/2014     Priority: Medium    Smoker 04/21/2014     Priority: Medium     Ready to quit. Only 3 cigs in a week      Family planning 04/22/2013     Priority: Medium    Moderate major depression (H) 04/22/2013     Priority: Medium     Denies suicidal or homicidal ideations at this time. Working with RAÚL Molina      Anxiety 04/22/2013     Priority: Medium    Chronic fatigue 04/22/2013     Priority: Medium    Bipolar disorder, unspecified (H) 08/09/2012     Priority: Medium            Past Medical History:     Past Medical History:   Diagnosis Date    Attention deficit disorder without mention of hyperactivity 5/31/2000    Bipolar affective disorder 8/9/2012    Bulimia 8/9/2012    Self-injurious behavior 8/9/2012    Suicide attempt 8/9/2012     Past Surgical History:   Procedure Laterality Date    cyst ovary      LT    CYSTOSCOPY N/A 04/06/2022    Procedure: Cystoscopy;  Surgeon: Juan Alberto Cain MD;  Location: [facility]    HYSTERECTOMY, PAP NO LONGER INDICATED N/A 04/06/2022    Cervix removed.    LAPAROSCOPIC ASSISTED HYSTERECTOMY VAGINAL N/A  04/06/2022    Procedure: LAPAROSCOPIC ASSISTED VAGINAL HYSTERECTOMY;  Surgeon: Juan Alberto Cain MD;  Location: [facility]    MAMMOPLASTY REDUCTION  01/01/2011    REPAIR TENDON PATELLA Left 07/10/2020    Procedure: Left knee patellorfemoral ligament repair;  Surgeon: Melchor Perkins DO;  Location: [facility]    SALPINGECTOMY Bilateral 04/06/2022    Procedure: BILATERAL SALPINGECTOMY;  Surgeon: Juan Alberto Cain MD;  Location: [facility]    wisdom teeth removed              Social History:     Social History     Socioeconomic History    Marital status: Single     Spouse name: Not on file    Number of children: Not on file    Years of education: Not on file    Highest education level: Not on file   Occupational History    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 longest tobacco free 1 year. no passive exposure   Vaping Use    Vaping Use: Never used   Substance and Sexual Activity    Alcohol use: Yes     Comment: rarely    Drug use: No    Sexual activity: Not on file   Other Topics Concern     Service Not Asked    Blood Transfusions Yes    Caffeine Concern No    Occupational Exposure Not Asked    Hobby Hazards Not Asked    Sleep Concern Not Asked    Stress Concern Not Asked    Weight Concern Not Asked    Special Diet Not Asked    Back Care Not Asked    Exercise Yes     Comment: 3 days week    Bike Helmet Not Asked    Seat Belt Yes    Self-Exams Yes    Parent/sibling w/ CABG, MI or angioplasty before 65F 55M? Not Asked   Social History Narrative    Not on file     Social Determinants of Health     Financial Resource Strain: Not on file   Food Insecurity: Not on file   Transportation Needs: Not on file   Physical Activity: Not on file   Stress: Not on file   Social Connections: Not on file   Intimate Partner Violence: Not on file   Housing  "Stability: Not on file          Family History:     Family History   Problem Relation Age of Onset    Cancer Maternal Grandmother         pancreatic cancer; cause of death    Cancer Father         rectal    Colon Cancer Father     Heart Disease Paternal Grandmother         heart disease    Hypertension Mother             Allergies:   No Known Allergies         Medications:     Current Outpatient Medications   Medication Sig Dispense Refill    lisdexamfetamine (VYVANSE) 60 MG capsule Take 1 capsule (60 mg) by mouth daily 30 capsule 0            Physical Exam:   Resp. rate 16, height 1.651 m (5' 5\"), weight 83.9 kg (185 lb), last menstrual period 01/30/2022, SpO2 96 %.  Wt Readings from Last 6 Encounters:   02/13/23 79.4 kg (175 lb)   12/16/22 84.4 kg (186 lb)   05/05/22 84.4 kg (186 lb)   04/06/22 87.5 kg (193 lb)   03/25/22 86.5 kg (190 lb 12.8 oz)   01/28/22 79.4 kg (175 lb)     Constitutional: well-developed, appearing stated age; cooperative  Eyes: nl PERRLA, conjunctiva, sclera  ENT: nl external ears, nose, hearing, lips, teeth, gums, throat. No mucositis.   No mucous membrane lesions, decreased saliva pool  Neck: no mass or thyroid enlargement  Resp: lungs clear to auscultation  CV: RRR, no murmurs, rubs or gallops, no edema  Lymph: no cervical, supraclavicular or epitrochlear nodes  MS: The TMJ, neck, shoulder, elbow, wrist, MCP/PIP/DIP, spine, knee, ankle, and foot MTP/IP joints were examined and found normal. No active synovitis or altered joint anatomy. Full joint ROM. Normal  strength. No dactylitis,  tenosynovitis, enthesopathy.  Some tenderness to palpation of the left shoulder musculature and left SI joint  Skin: no nail pitting, alopecia, rash, nodules or lesions.   Psych: nl judgement, orientation, memory, affect.           Data:   Imaging:  MRI left knee 06/18/2020  IMPRESSION:    Complex complete versus near complete full-thickness tear involving  the patellar attachment of the patellofemoral " ligament.     Partial-thickness tears involving the retropatellar articular  cartilage of the apex.     Inflammatory changes within Hoffa's fat pad and findings suggesting  inflammation involving the deep infrapatellar bursa.    Laboratory:  12/16/2022  PARVIZ positive with a dense fine speckled pattern and a titer of 1:160  dsDNA negative  Lyme negative  Aldolase 4.0  Crp<3.00  Rheumatoid Factor <6    02/03/2023  SSA equivocal 9.7  SSB negative  Albumin 4.2  ALT 18, AST 14  TSH 1.85

## 2023-07-31 ENCOUNTER — OFFICE VISIT (OUTPATIENT)
Dept: RHEUMATOLOGY | Facility: CLINIC | Age: 38
End: 2023-07-31
Attending: PHYSICIAN ASSISTANT
Payer: COMMERCIAL

## 2023-07-31 VITALS — RESPIRATION RATE: 16 BRPM | WEIGHT: 185 LBS | HEIGHT: 65 IN | BODY MASS INDEX: 30.82 KG/M2 | OXYGEN SATURATION: 96 %

## 2023-07-31 DIAGNOSIS — R76.8 POSITIVE ANA (ANTINUCLEAR ANTIBODY): Primary | ICD-10-CM

## 2023-07-31 DIAGNOSIS — R53.83 OTHER FATIGUE: ICD-10-CM

## 2023-07-31 DIAGNOSIS — M25.50 MULTIPLE JOINT PAIN: ICD-10-CM

## 2023-07-31 LAB
CRP SERPL-MCNC: <3 MG/L
DEPRECATED CALCIDIOL+CALCIFEROL SERPL-MC: 24 UG/L (ref 20–75)
DRVVT SCREEN RATIO: 0.85
ERYTHROCYTE [SEDIMENTATION RATE] IN BLOOD BY WESTERGREN METHOD: 4 MM/HR (ref 0–20)
INR PPP: 0.99 (ref 0.85–1.15)
LA PPP-IMP: NEGATIVE
LUPUS INTERPRETATION: NORMAL
PTT RATIO: 1.14
THROMBIN TIME: 17.5 SECONDS (ref 13–19)

## 2023-07-31 PROCEDURE — 85613 RUSSELL VIPER VENOM DILUTED: CPT | Performed by: PHYSICIAN ASSISTANT

## 2023-07-31 PROCEDURE — 86200 CCP ANTIBODY: CPT | Performed by: PHYSICIAN ASSISTANT

## 2023-07-31 PROCEDURE — 86160 COMPLEMENT ANTIGEN: CPT | Performed by: PHYSICIAN ASSISTANT

## 2023-07-31 PROCEDURE — 85652 RBC SED RATE AUTOMATED: CPT | Performed by: PHYSICIAN ASSISTANT

## 2023-07-31 PROCEDURE — 86147 CARDIOLIPIN ANTIBODY EA IG: CPT | Performed by: PHYSICIAN ASSISTANT

## 2023-07-31 PROCEDURE — 86376 MICROSOMAL ANTIBODY EACH: CPT | Performed by: PHYSICIAN ASSISTANT

## 2023-07-31 PROCEDURE — 86146 BETA-2 GLYCOPROTEIN ANTIBODY: CPT | Performed by: PHYSICIAN ASSISTANT

## 2023-07-31 PROCEDURE — 86140 C-REACTIVE PROTEIN: CPT | Performed by: PHYSICIAN ASSISTANT

## 2023-07-31 PROCEDURE — 99204 OFFICE O/P NEW MOD 45 MIN: CPT | Performed by: PHYSICIAN ASSISTANT

## 2023-07-31 PROCEDURE — 82306 VITAMIN D 25 HYDROXY: CPT | Performed by: PHYSICIAN ASSISTANT

## 2023-07-31 PROCEDURE — 85730 THROMBOPLASTIN TIME PARTIAL: CPT | Performed by: PHYSICIAN ASSISTANT

## 2023-07-31 PROCEDURE — 36415 COLL VENOUS BLD VENIPUNCTURE: CPT | Performed by: PHYSICIAN ASSISTANT

## 2023-07-31 PROCEDURE — 86235 NUCLEAR ANTIGEN ANTIBODY: CPT | Performed by: PHYSICIAN ASSISTANT

## 2023-07-31 PROCEDURE — 85390 FIBRINOLYSINS SCREEN I&R: CPT | Performed by: PATHOLOGY

## 2023-07-31 ASSESSMENT — PAIN SCALES - GENERAL: PAINLEVEL: SEVERE PAIN (6)

## 2023-07-31 NOTE — PATIENT INSTRUCTIONS
After Visit Instructions:     Thank you for coming to [facility] for your care. It is my goal to partner with you to help you reach your optimal state of health.       Plan:     Schedule follow-up with Charlotte Frazier PA-C as needed  Labs: complement levels, JUAN panel, antiphospholipid panel,  thyroid peroxidase antibody, Scl-70, Centromere antibody, CCP antibody, CRP and Sed Rate, vitamin D  Other: consider referral to ENT for a minor lip biopsy      Charlotte Frazier PA-C  [facility]  [facility]    Contact information: [facility]  Clinic Number:  [phone]  Please call or send a Gennio message with any questions about your care

## 2023-08-01 LAB
B2 GLYCOPROT1 IGG SERPL IA-ACNC: 0.8 U/ML
B2 GLYCOPROT1 IGM SERPL IA-ACNC: <2.4 U/ML
C3 SERPL-MCNC: 120 MG/DL (ref 81–157)
C4 SERPL-MCNC: 21 MG/DL (ref 13–39)
CARDIOLIPIN IGG SER IA-ACNC: <2 GPL-U/ML
CARDIOLIPIN IGG SER IA-ACNC: NEGATIVE
CARDIOLIPIN IGM SER IA-ACNC: 2 MPL-U/ML
CARDIOLIPIN IGM SER IA-ACNC: NEGATIVE
CCP AB SER IA-ACNC: 1.3 U/ML
CENTROMERE B AB SER-ACNC: <0.7 U/ML
CENTROMERE B AB SER-ACNC: NEGATIVE
ENA SCL70 IGG SER IA-ACNC: <0.6 U/ML
ENA SCL70 IGG SER IA-ACNC: NEGATIVE
ENA SM IGG SER IA-ACNC: <0.7 U/ML
ENA SM IGG SER IA-ACNC: NEGATIVE
ENA SS-A AB SER IA-ACNC: <0.5 U/ML
ENA SS-A AB SER IA-ACNC: NEGATIVE
ENA SS-B IGG SER IA-ACNC: <0.6 U/ML
ENA SS-B IGG SER IA-ACNC: NEGATIVE
THYROPEROXIDASE AB SERPL-ACNC: <10 IU/ML
U1 SNRNP IGG SER IA-ACNC: 2.9 U/ML
U1 SNRNP IGG SER IA-ACNC: NEGATIVE

## 2023-08-15 ENCOUNTER — MYC REFILL (OUTPATIENT)
Dept: FAMILY MEDICINE | Facility: OTHER | Age: 38
End: 2023-08-15

## 2023-08-15 DIAGNOSIS — F90.2 ADHD (ATTENTION DEFICIT HYPERACTIVITY DISORDER), COMBINED TYPE: ICD-10-CM

## 2023-08-16 RX ORDER — LISDEXAMFETAMINE DIMESYLATE 60 MG/1
60 CAPSULE ORAL DAILY
Qty: 30 CAPSULE | Refills: 0 | Status: SHIPPED | OUTPATIENT
Start: 2023-08-16 | End: 2023-09-12

## 2023-08-16 NOTE — TELEPHONE ENCOUNTER
Yana      Last Written Prescription Date:  7.19.23  Last Fill Quantity: #30,   # refills: 0  Last Office Visit: 2.13.23  Future Office visit:    Next 5 appointments (look out 90 days)      Sep 18, 2023 10:15 AM  (Arrive by 10:00 AM)  SHORT with PRUDENCE Braswell  [facility] ([facility] [place] ) [address]  [place] [address]  [phone]             Routing refill request to provider for review/approval because:  Drug not on the [facility], [facility] or [facility] refill protocol or controlled substance

## 2023-09-12 ENCOUNTER — MYC REFILL (OUTPATIENT)
Dept: FAMILY MEDICINE | Facility: OTHER | Age: 38
End: 2023-09-12

## 2023-09-12 DIAGNOSIS — F90.2 ADHD (ATTENTION DEFICIT HYPERACTIVITY DISORDER), COMBINED TYPE: ICD-10-CM

## 2023-09-18 ENCOUNTER — OFFICE VISIT (OUTPATIENT)
Dept: FAMILY MEDICINE | Facility: OTHER | Age: 38
End: 2023-09-18
Attending: PHYSICIAN ASSISTANT
Payer: COMMERCIAL

## 2023-09-18 VITALS
HEART RATE: 68 BPM | SYSTOLIC BLOOD PRESSURE: 136 MMHG | DIASTOLIC BLOOD PRESSURE: 96 MMHG | OXYGEN SATURATION: 98 % | WEIGHT: 195.5 LBS | TEMPERATURE: 97.7 F | BODY MASS INDEX: 32.53 KG/M2

## 2023-09-18 DIAGNOSIS — R73.09 ELEVATED GLUCOSE: ICD-10-CM

## 2023-09-18 DIAGNOSIS — R76.8 POSITIVE ANA (ANTINUCLEAR ANTIBODY): ICD-10-CM

## 2023-09-18 DIAGNOSIS — M79.89 BILATERAL HAND SWELLING: ICD-10-CM

## 2023-09-18 DIAGNOSIS — M25.50 MULTIPLE JOINT PAIN: ICD-10-CM

## 2023-09-18 DIAGNOSIS — F90.2 ADHD (ATTENTION DEFICIT HYPERACTIVITY DISORDER), COMBINED TYPE: Primary | ICD-10-CM

## 2023-09-18 LAB
EST. AVERAGE GLUCOSE BLD GHB EST-MCNC: 100 MG/DL
HBA1C MFR BLD: 5.1 %

## 2023-09-18 PROCEDURE — 99213 OFFICE O/P EST LOW 20 MIN: CPT | Performed by: PHYSICIAN ASSISTANT

## 2023-09-18 PROCEDURE — 36415 COLL VENOUS BLD VENIPUNCTURE: CPT | Performed by: PHYSICIAN ASSISTANT

## 2023-09-18 PROCEDURE — 83036 HEMOGLOBIN GLYCOSYLATED A1C: CPT | Performed by: PHYSICIAN ASSISTANT

## 2023-09-18 RX ORDER — LISDEXAMFETAMINE DIMESYLATE 60 MG/1
60 CAPSULE ORAL DAILY
Qty: 30 CAPSULE | Refills: 0 | Status: SHIPPED | OUTPATIENT
Start: 2023-09-18 | End: 2023-10-16

## 2023-09-18 RX ORDER — FUROSEMIDE 20 MG
20 TABLET ORAL DAILY
Qty: 30 TABLET | Refills: 3 | Status: SHIPPED | OUTPATIENT
Start: 2023-09-18

## 2023-09-18 ASSESSMENT — ANXIETY QUESTIONNAIRES
3. WORRYING TOO MUCH ABOUT DIFFERENT THINGS: NOT AT ALL
GAD7 TOTAL SCORE: 2
7. FEELING AFRAID AS IF SOMETHING AWFUL MIGHT HAPPEN: NOT AT ALL
GAD7 TOTAL SCORE: 2
1. FEELING NERVOUS, ANXIOUS, OR ON EDGE: NOT AT ALL
IF YOU CHECKED OFF ANY PROBLEMS ON THIS QUESTIONNAIRE, HOW DIFFICULT HAVE THESE PROBLEMS MADE IT FOR YOU TO DO YOUR WORK, TAKE CARE OF THINGS AT HOME, OR GET ALONG WITH OTHER PEOPLE: SOMEWHAT DIFFICULT
2. NOT BEING ABLE TO STOP OR CONTROL WORRYING: NOT AT ALL
5. BEING SO RESTLESS THAT IT IS HARD TO SIT STILL: NOT AT ALL
6. BECOMING EASILY ANNOYED OR IRRITABLE: MORE THAN HALF THE DAYS
4. TROUBLE RELAXING: NOT AT ALL

## 2023-09-18 ASSESSMENT — PAIN SCALES - GENERAL
PAINLEVEL: MILD PAIN (2)
PAINLEVEL: MODERATE PAIN (5)

## 2023-09-18 ASSESSMENT — PATIENT HEALTH QUESTIONNAIRE - PHQ9: SUM OF ALL RESPONSES TO PHQ QUESTIONS 1-9: 11

## 2023-09-18 NOTE — PROGRESS NOTES
"  Assessment & Plan     ADHD (attention deficit hyperactivity disorder), combined type  She is on medication and helping her focus. Doesn't keep her awake as well but focus is better.     Positive PARVIZ (antinuclear antibody)  Has seen Rheumatology and they recommended ENT.  Oral biopxy is recommended. ? Sjogrens. Scleroderma is negative.   - Adult ENT  Referral; Future    Multiple joint pain  Not getting better. Has positive antibodies on her PARVIZ and so we are just waiting for things to turn positive now.   - Adult ENT  Referral; Future    Elevated glucose  New for her hceck her A1C . Had this in the ER this summer.   - Hemoglobin A1c; Future    Review of external notes as documented elsewhere in note  Ordering of each unique test  Prescription drug management  10  minutes spent by me on the date of the encounter doing chart review, history and exam, documentation and further activities per the note       BMI:   Estimated body mass index is 32.53 kg/m  as calculated from the following:    Height as of 7/31/23: 1.651 m (5' 5\").    Weight as of this encounter: 88.7 kg (195 lb 8 oz).       Work on weight loss  See Patient Instructions    No follow-ups on file.    PRUDENCE Jj  [facility] ROSANNA Malagon is a 38 year old, presenting for the following health issues:  Recheck Medication and RECHECK        9/18/2023    10:10 AM   Additional Questions   Roomed by Any De Dios   Accompanied by none         9/18/2023    10:10 AM   Patient Reported Additional Medications   Patient reports taking the following new medications none       HPI     patient would like to go over what Rheumatology had to say   Want to review labs.     Medication Followup of vyvanse  Taking Medication as prescribed: yes  Side Effects:  none  Medication Helping Symptoms:  NO-doesn't seen to be lasting as long        Review of Systems   Constitutional, HEENT, cardiovascular, pulmonary, GI, , " musculoskeletal, neuro, skin, endocrine and psych systems are negative, except as otherwise noted.      Objective    BP (!) 150/91 (BP Location: Left arm, Patient Position: Sitting, Cuff Size: Adult Regular)   Pulse 68   Temp 97.7  F (36.5  C) (Tympanic)   Wt 88.7 kg (195 lb 8 oz)   LMP 01/30/2022   SpO2 98%   BMI 32.53 kg/m    Body mass index is 32.53 kg/m .  Physical Exam   GENERAL: healthy, alert and no distress  EYES: Eyes grossly normal to inspection, PERRL and conjunctivae and sclerae normal  HENT: ear canals and TM's normal, nose and mouth without ulcers or lesions  NECK: no adenopathy, no asymmetry, masses, or scars and thyroid normal to palpation  RESP: lungs clear to auscultation - no rales, rhonchi or wheezes  BREAST: normal without masses, tenderness or nipple discharge and no palpable axillary masses or adenopathy  CV: regular rate and rhythm, normal S1 S2, no S3 or S4, no murmur, click or rub, no peripheral edema and peripheral pulses strong  ABDOMEN: soft, nontender, no hepatosplenomegaly, no masses and bowel sounds normal  MS: no gross musculoskeletal defects noted, no edema  SKIN: no suspicious lesions or rashes  NEURO: Normal strength and tone, mentation intact and speech normal  PSYCH: mentation appears normal, affect normal/bright    Office Visit on 07/31/2023   Component Date Value Ref Range Status    INR 07/31/2023 0.99  0.85 - 1.15 Final    Thrombin Time 07/31/2023 17.5  13.0 - 19.0 Seconds Final    PTT Ratio 07/31/2023 1.14  <1.21 Final    DRVVT Screen Ratio 07/31/2023 0.85  <1.08 Final    Lupus Result 07/31/2023 Negative  Negative Final    Lupus Interpretation 07/31/2023    Final                    Value:The INR is normal.  APTT ratio is normal.    DRVVT Screen ratio is normal.  Thrombin time is normal.  NEGATIVE TEST; A LUPUS ANTICOAGULANT WAS NOT DETECTED IN THIS SPECIMEN WITHIN THE LIMITS OF THE TESTING REPERTOIRE.  If the clinical picture is strongly suggestive of an  antiphospholipid syndrome, recommend anticardiolipin and beta-2-glycoprotein (IgG and IgM) antibody tests.    Nevaeh Zabala MD, PhD  [facility]            Cardiolipin Sherice IgG Instrument Deneen* 07/31/2023 <2.0  <10.0 GPL-U/mL Final    Cardiolipin Antibody IgG 07/31/2023 Negative  Negative Final    Cardiolipin Sherice IgM Instrument Deneen* 07/31/2023 2.0  <10.0 MPL-U/mL Final    Cardiolipin Antibody IgM 07/31/2023 Negative  Negative Final    Beta 2 Glycoprotein 1 Antibody IgG 07/31/2023 0.8  <7.0 U/mL Final    Negative    Beta 2 Glycoprotein 1 Antibody IgM 07/31/2023 <2.4  <7.0 U/mL Final    Negative    CRP Inflammation 07/31/2023 <3.00  <5.00 mg/L Final    Erythrocyte Sedimentation Rate 07/31/2023 4  0 - 20 mm/hr Final    Cyclic Citrullinated Peptide Antib* 07/31/2023 1.3  <7.0 U/mL Final    Negative    C3 Complement 07/31/2023 120  81 - 157 mg/dL Final    C4 Complement 07/31/2023 21  13 - 39 mg/dL Final    RNP Sherice IgG Instrument Value 07/31/2023 2.9  <5.0 U/mL Final    RNP Antibody IgG 07/31/2023 Negative  Negative Final    Gunter JUAN Sherice IgG Instrument Value 07/31/2023 <0.7  <7.0 U/mL Final    Gunter JUAN Antibody IgG 07/31/2023 Negative  Negative Final    SSA Sherice IgG Instrument Value 07/31/2023 <0.5  <7.0 U/mL Final    SSA (Ro) Antibody IgG 07/31/2023 Negative  Negative Final    SSB Sherice IgG Instrument Value 07/31/2023 <0.6  <7.0 U/mL Final    SSB (La) Antibody IgG 07/31/2023 Negative  Negative Final    Scl-70 Sherice IgG Instrument Value 07/31/2023 <0.6  <7.0 U/mL Final    Scl-70 Antibody IgG 07/31/2023 Negative  Negative Final    Centromere Sherice IgG Instrument Value 07/31/2023 <0.7  <7.0 U/mL Final    Centromere Antibody IgG 07/31/2023 Negative  Negative Final    Thyroid Peroxidase Antibody 07/31/2023 <10  <35 IU/mL Final    Vitamin D, Total (25-Hydroxy) 07/31/2023 24  20 - 75 ug/L Final

## 2023-09-29 NOTE — PATIENT INSTRUCTIONS
Start a trial of Elavil 10 mg at night  Speak to pharmacist at .   Start Budesonide rinses.   Rinse 1-2 times daily.     Oral biopsy completed.   Follow post procedure instructions  Complete allergy panel today.   Results take about 10-14 days     Thank you for allowing PRUDENCE Watts and our ENT team to participate in your care.  If your medications are too expensive, please give the nurse a call.  We can possibly change this medication.  If you have a scheduling or an appointment question please contact our Health Unit Coordinator at their direct line [phone].   ALL nursing questions or concerns can be directed to your ENT nurse, Daxa at: [phone].    Budesonide nasal saline irrigation per instructions:  -Obtain Kieran Med Sinus rinse over the counter.    -Use warm distilled water and 2 packets of the salt solution that comes with the bottle, dissolve in bottle up to the 240 mL moses.  -Add 1 vial of budesonide.  -Irrigate each side of your nose leaning over the sink, using 1/3 to 1/2 the volume of the bottle in each nostril every irrigation.  Irrigate 2 times daily.  -If additional rinses are needed/recommended, you may use the plan Kieran Med Sinus irrigation without the use of added budesonide            POST PROCEDURE INSTRUCTIONS    For oral lesions, rinse your mouth with a mixture of half water and half hydrogen peroxide 3 times daily, after meals and before bed.     For lip lesions, apply Aquaphor Healing Ointment to the wound 3 times daily after cleaning with above mixture(Unless specified Bacitracin).    You can apply ice to the surgical area to help reduce swelling. (no longer than 20 minutes at a time)     You can use acetaminophen(Tylenol) or the prescription you received for pain.     If you have sutures in place these are dissolvable. They will fall out on their own. DO NOT play with them as this will cause more irritation to the surgical area.     If cautery was used, that is the blackened  area you see. This will fade away and can become a white patchy area. This is normal! Continue to clean wound as described above.     SIGNS OF INFECTION ARE:  Redness, swelling, red streaks, pus, drainage, warmth, fever, increased pain, foul smell.   Contact your primary health care provider if you notice any of the warning signs.     FOLLOW - UP  Follow-up as needed in clinic.   Pathology results will be called to you when they are back. This can take approx. 7-10 days.     Follow up in 1 month for recheck

## 2023-10-05 ENCOUNTER — OFFICE VISIT (OUTPATIENT)
Dept: OTOLARYNGOLOGY | Facility: OTHER | Age: 38
End: 2023-10-05
Attending: PHYSICIAN ASSISTANT
Payer: COMMERCIAL

## 2023-10-05 VITALS
OXYGEN SATURATION: 99 % | TEMPERATURE: 98 F | DIASTOLIC BLOOD PRESSURE: 76 MMHG | BODY MASS INDEX: 32.49 KG/M2 | WEIGHT: 195 LBS | SYSTOLIC BLOOD PRESSURE: 138 MMHG | HEART RATE: 78 BPM | HEIGHT: 65 IN

## 2023-10-05 DIAGNOSIS — M25.50 MULTIPLE JOINT PAIN: ICD-10-CM

## 2023-10-05 DIAGNOSIS — K11.7 XEROSTOMIA: ICD-10-CM

## 2023-10-05 DIAGNOSIS — R76.8 POSITIVE ANA (ANTINUCLEAR ANTIBODY): Primary | ICD-10-CM

## 2023-10-05 DIAGNOSIS — R05.3 CHRONIC COUGH: ICD-10-CM

## 2023-10-05 DIAGNOSIS — R09.82 POST-NASAL DRAINAGE: ICD-10-CM

## 2023-10-05 PROCEDURE — 82785 ASSAY OF IGE: CPT | Mod: 90 | Performed by: PHYSICIAN ASSISTANT

## 2023-10-05 PROCEDURE — 88305 TISSUE EXAM BY PATHOLOGIST: CPT | Mod: TC | Performed by: PHYSICIAN ASSISTANT

## 2023-10-05 PROCEDURE — 99214 OFFICE O/P EST MOD 30 MIN: CPT | Mod: 25 | Performed by: PHYSICIAN ASSISTANT

## 2023-10-05 PROCEDURE — 31575 DIAGNOSTIC LARYNGOSCOPY: CPT | Performed by: PHYSICIAN ASSISTANT

## 2023-10-05 PROCEDURE — 88305 TISSUE EXAM BY PATHOLOGIST: CPT | Mod: 26 | Performed by: PATHOLOGY

## 2023-10-05 PROCEDURE — 36415 COLL VENOUS BLD VENIPUNCTURE: CPT | Performed by: PHYSICIAN ASSISTANT

## 2023-10-05 PROCEDURE — 86003 ALLG SPEC IGE CRUDE XTRC EA: CPT | Mod: 90 | Performed by: PHYSICIAN ASSISTANT

## 2023-10-05 PROCEDURE — 40808 BIOPSY OF MOUTH LESION: CPT | Performed by: PHYSICIAN ASSISTANT

## 2023-10-05 RX ORDER — LIDOCAINE HYDROCHLORIDE AND EPINEPHRINE 10; 10 MG/ML; UG/ML
2 INJECTION, SOLUTION INFILTRATION; PERINEURAL ONCE
Status: DISCONTINUED | OUTPATIENT
Start: 2023-10-05 | End: 2023-10-05

## 2023-10-05 RX ORDER — AMITRIPTYLINE HYDROCHLORIDE 10 MG/1
10 TABLET ORAL AT BEDTIME
Qty: 30 TABLET | Refills: 1 | Status: SHIPPED | OUTPATIENT
Start: 2023-10-05

## 2023-10-05 RX ORDER — BUDESONIDE 0.5 MG/2ML
INHALANT ORAL
Qty: 200 ML | Refills: 3 | Status: SHIPPED | OUTPATIENT
Start: 2023-10-05

## 2023-10-05 ASSESSMENT — PAIN SCALES - GENERAL: PAINLEVEL: NO PAIN (0)

## 2023-10-05 NOTE — LETTER
10/5/2023         RE: Vesna Fabian  [address]  [place] [address]        Dear Colleague,    Thank you for referring your patient, Vesna Fabian, to the [facility] [place]. Please see a copy of my visit note below.        Patient: Vesna Fabian  : 1985    Patient presents with:  Consult: Positive antinuclear antibody, multiple joint pain,  Referred by,  PRUDENCE Mccloud (per note: Rheumatology recommended ENT/oral biopsy ?Sjogren's).       HPI:  Vesna Fabian is a 38 year old female seen today for oral biopsy. a patient was seen by rheumatology and recommended ENT exam with possible oral biopsy.  They are looking for concerns for Sjogren's.  Patient reports ongoing concerns of joint pain, fatigue for the last 10 years. She feels symptoms have been more progressive and unable to manage.   Rheumatology recommended oral bx.       Patient reports constant coughing, throat clearing.   Patient denies sore throat or throat pain  Denies chronic otalgia.   Denies fevers, night sweats or weight loss.   Denies dysphagia or dysphonia.   +globus sensation.     Water- adequate  Caffeine- quit  ETOH- beer social.   Tobacco- working on quitting tobacco.   Reflux- Overall no.     Current Outpatient Rx   Medication Sig Dispense Refill     furosemide (LASIX) 20 MG tablet Take 1 tablet (20 mg) by mouth daily 30 tablet 3     lisdexamfetamine (VYVANSE) 60 MG capsule Take 1 capsule (60 mg) by mouth daily 30 capsule 0       Allergies: Patient has no known allergies.     Past Medical History:   Diagnosis Date     Attention deficit disorder without mention of hyperactivity 2000     Bipolar affective disorder 2012     Bulimia 2012     Self-injurious behavior 2012     Suicide attempt 2012       Past Surgical History:   Procedure Laterality Date     cyst ovary      LT     CYSTOSCOPY N/A 2022    Procedure: Cystoscopy;  Surgeon: Juan Alberto Cain MD;  Location: [facility]     HYSTERECTOMY,  "PAP NO LONGER INDICATED N/A 04/06/2022    Cervix removed.     LAPAROSCOPIC ASSISTED HYSTERECTOMY VAGINAL N/A 04/06/2022    Procedure: LAPAROSCOPIC ASSISTED VAGINAL HYSTERECTOMY;  Surgeon: Juan Alberto Cain MD;  Location: [facility] OR     MAMMOPLASTY REDUCTION  01/01/2011     REPAIR TENDON PATELLA Left 07/10/2020    Procedure: Left knee patellorfemoral ligament repair;  Surgeon: Melchor Perkins DO;  Location: [facility] OR     SALPINGECTOMY Bilateral 04/06/2022    Procedure: BILATERAL SALPINGECTOMY;  Surgeon: Juan Alberto Cain MD;  Location: [facility] OR     wisdom teeth removed         ENT family history reviewed    Social History     Tobacco Use     Smoking status: Former     Years: 10.00     Types: Cigarettes     Quit date: 1/19/2020     Years since quitting: 3.7     Smokeless tobacco: Never     Tobacco comments:     quit 2011. longest tobacco free 1 year. no passive exposure   Vaping Use     Vaping Use: Never used   Substance Use Topics     Alcohol use: Yes     Comment: rarely     Drug use: No       Review of Systems  ROS: 10 point ROS neg other than the symptoms noted above in the HPI     Physical Exam  /76 (BP Location: Right arm, Patient Position: Sitting, Cuff Size: Adult Regular)   Pulse 78   Temp 98  F (36.7  C) (Tympanic)   Ht 1.651 m (5' 5\")   Wt 88.5 kg (195 lb)   LMP 01/30/2022   SpO2 99%   BMI 32.45 kg/m      General - The patient is well nourished and well developed, and appears to have good nutritional status.  Alert and oriented to person and place, answers questions and cooperates with examination appropriately.   Head and Face - Normocephalic and atraumatic, with no gross asymmetry noted.  The facial nerve is intact, with strong symmetric movements.  Voice and Breathing - The patient was breathing comfortably without the use of accessory muscles. There was no wheezing, stridor, or stertor.  The patients voice was clear and strong, and had appropriate pitch and quality.  Ears -The external auditory canals are " patent, the tympanic membranes are intact without effusion, retraction or mass.  Bony landmarks are intact.  Eyes - Extraocular movements intact, and the pupils were reactive to light.  Sclera were not icteric or injected, conjunctiva were pink and moist.  Mouth - Examination of the oral cavity showed pink, healthy oral mucosa. No lesions or ulcerations noted.  The tongue was mobile and midline, and the dentition were in good condition.    Throat - The walls of the oropharynx were smooth, pink, moist, symmetric, and had no lesions or ulcerations.  The tonsillar pillars and soft palate were symmetric.  The uvula was midline on elevation.    Neck - Normal midline excursion of the laryngotracheal complex during swallowing.  Full range of motion on passive movement.  Palpation of the occipital, submental, submandibular, internal jugular chain, and supraclavicular nodes did not demonstrate any abnormal lymph nodes or masses.  Palpation of the thyroid was soft and smooth, with no nodules or goiter appreciated.  The trachea was mobile and midline.  Nose - External contour is symmetric, no gross deflection or scars.  Nasal mucosa is pink and moist with no abnormal mucus.        Flexible Endoscopy -     Attempts at mirror laryngoscopy were not possible due to gag reflex.  Therefore I proceeded with a fiberoptic examination.  First I sprayed both sides of the nose with a mixture of lidocaine and neosynephrine.  I then passed the scope through the nasal cavity.   The nasal cavity was unremarkable. Deviated nasal septum, turbinates edematous. Post nasal drainage in NP.  The nasopharynx was mucosally covered and symmetric.  The Eustachian tube openings were unobstructed.  Going further down I had a clear view of the base of tongue which had normal appearing lingual tonsillar tissue.  The base of tongue was free of lesions, and the vallecula was open.  The epiglottis was smooth and mucosally covered.  The supraglottic larynx was  then clearly visualized.  The vocal cords moved smoothly and symmetrically, they were pearly white and no lesions were seen.  The pyriform sinuses were open, and the limited view of the postcricoid region did not show any lesions.    Procedure - Oral cavity Biopsy.     Informed consent was discussed and signed, and risks of the procedure were discussed, including bleeding, anesthesia, infection, scar formation, numbness, need for additional surgery, injury to salivary tissue.  I then infiltrated the mucosal tissues with 2% lidocaine with 1:200,000 epinephrine.  I then used a  3 mm punch biopsy to obtain a sample.  I proceeded to use a fine iris scissor to undermine the lesion and excise it.   Hemostasis was achieved with direct pressure and silver nitrate cautery.  The total length of the incision was 4 mm.  The specimen(s) was placed in formalin and sent to pathology.  The patient tolerated the procedure without any difficulty.            Impression and Plan- Vesna Fabian is a 38 year old female with:    ICD-10-CM    1. Positive PARVZI (antinuclear antibody)  R76.8 Adult ENT  Referral     lidocaine 2%-EPINEPHrine 1:100,000 injection 1 mL     DISCONTINUED: lidocaine 1% with EPINEPHrine 1:100,000 injection 2 mL      2. Multiple joint pain  M25.50 Adult ENT  Referral      3. Chronic cough  R05.3 budesonide (PULMICORT) 0.5 MG/2ML neb solution     amitriptyline (ELAVIL) 10 MG tablet      4. Post-nasal drainage  R09.82 budesonide (PULMICORT) 0.5 MG/2ML neb solution     amitriptyline (ELAVIL) 10 MG tablet     Inhalent Panel [place] Region (Serolab)     Total IgE (Serolab)     Total IgE (Serolab)     Inhalent Panel [place] Region (Serolab)      5. Xerostomia  K11.7 Surgical Pathology Exam          Start a trial of Elavil 10 mg at night  Complete pharm consult at .     Start Budesonide rinses.   Rinse 1-2 times daily.     Oral biopsy completed.   Follow post procedure instructions    Complete allergy panel  today.   Results take about 10-14 days     Elavil (Amitryptiline) side effects include fatigue, dizziness, blurred vision, dry mouth, problems with interaction with other medications,  lower seizure threshold, prolonged QT interval, serotonin syndrome with diarrhea, facial flushing, palpitations    This patient has had removal of an oral cavity lesion today.  I have instructed the patient on wound care with 1/2 strength peroxide rinses for 1 week, and  Diet.  Ibuprofen alternated with tylenol prn pain.  The patient will be called with pathology results.        Arline Fernandez PA-C  ENT  [facility] [place]      Again, thank you for allowing me to participate in the care of your patient.        Sincerely,        Arline Fernandez PA-C

## 2023-10-05 NOTE — PROGRESS NOTES
Patient: Vesna Fabian  : 1985    Patient presents with:  Consult: Positive antinuclear antibody, multiple joint pain,  Referred by,  PRUDENCE Mccloud (per note: Rheumatology recommended ENT/oral biopsy ?Sjogren's).       HPI:  Vesna Fabian is a 38 year old female seen today for oral biopsy. a patient was seen by rheumatology and recommended ENT exam with possible oral biopsy.  They are looking for concerns for Sjogren's.  Patient reports ongoing concerns of joint pain, fatigue for the last 10 years. She feels symptoms have been more progressive and unable to manage.   Rheumatology recommended oral bx.       Patient reports constant coughing, throat clearing.   Patient denies sore throat or throat pain  Denies chronic otalgia.   Denies fevers, night sweats or weight loss.   Denies dysphagia or dysphonia.   +globus sensation.     Water- adequate  Caffeine- quit  ETOH- beer social.   Tobacco- working on quitting tobacco.   Reflux- Overall no.     Current Outpatient Rx   Medication Sig Dispense Refill    furosemide (LASIX) 20 MG tablet Take 1 tablet (20 mg) by mouth daily 30 tablet 3    lisdexamfetamine (VYVANSE) 60 MG capsule Take 1 capsule (60 mg) by mouth daily 30 capsule 0       Allergies: Patient has no known allergies.     Past Medical History:   Diagnosis Date    Attention deficit disorder without mention of hyperactivity 2000    Bipolar affective disorder 2012    Bulimia 2012    Self-injurious behavior 2012    Suicide attempt 2012       Past Surgical History:   Procedure Laterality Date    cyst ovary      LT    CYSTOSCOPY N/A 2022    Procedure: Cystoscopy;  Surgeon: Juan Alberto Cain MD;  Location: [facility]    HYSTERECTOMY, PAP NO LONGER INDICATED N/A 2022    Cervix removed.    LAPAROSCOPIC ASSISTED HYSTERECTOMY VAGINAL N/A 2022    Procedure: LAPAROSCOPIC ASSISTED VAGINAL HYSTERECTOMY;  Surgeon: Juan Alberto Cain MD;  Location: [facility]    MAMMOPLASTY REDUCTION   "01/01/2011    REPAIR TENDON PATELLA Left 07/10/2020    Procedure: Left knee patellorfemoral ligament repair;  Surgeon: Melchor Perkins DO;  Location: [facility]    SALPINGECTOMY Bilateral 04/06/2022    Procedure: BILATERAL SALPINGECTOMY;  Surgeon: Juan Alberto Cain MD;  Location: [facility]    wisdom teeth removed         ENT family history reviewed    Social History     Tobacco Use    Smoking status: Former     Years: 10.00     Types: Cigarettes     Quit date: 1/19/2020     Years since quitting: 3.7    Smokeless tobacco: Never    Tobacco comments:     quit 2011. longest tobacco free 1 year. no passive exposure   Vaping Use    Vaping Use: Never used   Substance Use Topics    Alcohol use: Yes     Comment: rarely    Drug use: No       Review of Systems  ROS: 10 point ROS neg other than the symptoms noted above in the HPI     Physical Exam  /76 (BP Location: Right arm, Patient Position: Sitting, Cuff Size: Adult Regular)   Pulse 78   Temp 98  F (36.7  C) (Tympanic)   Ht 1.651 m (5' 5\")   Wt 88.5 kg (195 lb)   LMP 01/30/2022   SpO2 99%   BMI 32.45 kg/m      General - The patient is well nourished and well developed, and appears to have good nutritional status.  Alert and oriented to person and place, answers questions and cooperates with examination appropriately.   Head and Face - Normocephalic and atraumatic, with no gross asymmetry noted.  The facial nerve is intact, with strong symmetric movements.  Voice and Breathing - The patient was breathing comfortably without the use of accessory muscles. There was no wheezing, stridor, or stertor.  The patients voice was clear and strong, and had appropriate pitch and quality.  Ears -The external auditory canals are patent, the tympanic membranes are intact without effusion, retraction or mass.  Bony landmarks are intact.  Eyes - Extraocular movements intact, and the pupils were reactive to light.  Sclera were not icteric or injected, conjunctiva were pink and moist.  Mouth " - Examination of the oral cavity showed pink, healthy oral mucosa. No lesions or ulcerations noted.  The tongue was mobile and midline, and the dentition were in good condition.    Throat - The walls of the oropharynx were smooth, pink, moist, symmetric, and had no lesions or ulcerations.  The tonsillar pillars and soft palate were symmetric.  The uvula was midline on elevation.    Neck - Normal midline excursion of the laryngotracheal complex during swallowing.  Full range of motion on passive movement.  Palpation of the occipital, submental, submandibular, internal jugular chain, and supraclavicular nodes did not demonstrate any abnormal lymph nodes or masses.  Palpation of the thyroid was soft and smooth, with no nodules or goiter appreciated.  The trachea was mobile and midline.  Nose - External contour is symmetric, no gross deflection or scars.  Nasal mucosa is pink and moist with no abnormal mucus.        Flexible Endoscopy -     Attempts at mirror laryngoscopy were not possible due to gag reflex.  Therefore I proceeded with a fiberoptic examination.  First I sprayed both sides of the nose with a mixture of lidocaine and neosynephrine.  I then passed the scope through the nasal cavity.   The nasal cavity was unremarkable. Deviated nasal septum, turbinates edematous. Post nasal drainage in NP.  The nasopharynx was mucosally covered and symmetric.  The Eustachian tube openings were unobstructed.  Going further down I had a clear view of the base of tongue which had normal appearing lingual tonsillar tissue.  The base of tongue was free of lesions, and the vallecula was open.  The epiglottis was smooth and mucosally covered.  The supraglottic larynx was then clearly visualized.  The vocal cords moved smoothly and symmetrically, they were pearly white and no lesions were seen.  The pyriform sinuses were open, and the limited view of the postcricoid region did not show any lesions.    Procedure - Oral cavity  Biopsy.     Informed consent was discussed and signed, and risks of the procedure were discussed, including bleeding, anesthesia, infection, scar formation, numbness, need for additional surgery, injury to salivary tissue.  I then infiltrated the mucosal tissues with 2% lidocaine with 1:200,000 epinephrine.  I then used a  3 mm punch biopsy to obtain a sample.  I proceeded to use a fine iris scissor to undermine the lesion and excise it.   Hemostasis was achieved with direct pressure and silver nitrate cautery.  The total length of the incision was 4 mm.  The specimen(s) was placed in formalin and sent to pathology.  The patient tolerated the procedure without any difficulty.            Impression and Plan- Vesna Fabian is a 38 year old female with:    ICD-10-CM    1. Positive PARVIZ (antinuclear antibody)  R76.8 Adult ENT  Referral     lidocaine 2%-EPINEPHrine 1:100,000 injection 1 mL     DISCONTINUED: lidocaine 1% with EPINEPHrine 1:100,000 injection 2 mL      2. Multiple joint pain  M25.50 Adult ENT  Referral      3. Chronic cough  R05.3 budesonide (PULMICORT) 0.5 MG/2ML neb solution     amitriptyline (ELAVIL) 10 MG tablet      4. Post-nasal drainage  R09.82 budesonide (PULMICORT) 0.5 MG/2ML neb solution     amitriptyline (ELAVIL) 10 MG tablet     Inhalent Panel [place] Region (Serolab)     Total IgE (Serolab)     Total IgE (Serolab)     Inhalent Panel [place] (Serolab)      5. Xerostomia  K11.7 Surgical Pathology Exam          Start a trial of Elavil 10 mg at night  Complete pharm consult at .     Start Budesonide rinses.   Rinse 1-2 times daily.     Oral biopsy completed.   Follow post procedure instructions    Complete allergy panel today.   Results take about 10-14 days     Elavil (Amitryptiline) side effects include fatigue, dizziness, blurred vision, dry mouth, problems with interaction with other medications,  lower seizure threshold, prolonged QT interval, serotonin syndrome with  diarrhea, facial flushing, palpitations    This patient has had removal of an oral cavity lesion today.  I have instructed the patient on wound care with 1/2 strength peroxide rinses for 1 week, and  Diet.  Ibuprofen alternated with tylenol prn pain.  The patient will be called with pathology results.        Arline Fernandez PA-C  ENT  [facility]

## 2023-10-16 ENCOUNTER — MYC REFILL (OUTPATIENT)
Dept: FAMILY MEDICINE | Facility: OTHER | Age: 38
End: 2023-10-16

## 2023-10-16 DIAGNOSIS — F90.2 ADHD (ATTENTION DEFICIT HYPERACTIVITY DISORDER), COMBINED TYPE: ICD-10-CM

## 2023-10-17 LAB
SCANNED LAB RESULT: NORMAL
SCANNED LAB RESULT: NORMAL

## 2023-10-18 RX ORDER — LISDEXAMFETAMINE DIMESYLATE 60 MG/1
60 CAPSULE ORAL DAILY
Qty: 30 CAPSULE | Refills: 0 | Status: SHIPPED | OUTPATIENT
Start: 2023-10-18 | End: 2023-11-13

## 2023-10-18 NOTE — TELEPHONE ENCOUNTER
Yana      Last Written Prescription Date:  9.18.23  Last Fill Quantity: #30,   # refills: 0  Last Office Visit: 9.18.23  Future Office visit:    Next 5 appointments (look out 90 days)      Nov 02, 2023 12:45 PM  (Arrive by 12:30 PM)  Return Visit with Arline Fernandez PA-C  [facility] [place] ([facility] [place] ) [address] [place] [address]  [phone]             Routing refill request to provider for review/approval because:  Drug not on the [facility], [facility] or [facility] refill protocol or controlled substance

## 2023-11-02 ENCOUNTER — OFFICE VISIT (OUTPATIENT)
Dept: OTOLARYNGOLOGY | Facility: OTHER | Age: 38
End: 2023-11-02
Attending: PHYSICIAN ASSISTANT
Payer: COMMERCIAL

## 2023-11-02 VITALS
WEIGHT: 195 LBS | SYSTOLIC BLOOD PRESSURE: 132 MMHG | DIASTOLIC BLOOD PRESSURE: 74 MMHG | HEIGHT: 65 IN | OXYGEN SATURATION: 98 % | HEART RATE: 75 BPM | BODY MASS INDEX: 32.49 KG/M2 | TEMPERATURE: 98 F

## 2023-11-02 DIAGNOSIS — R76.8 POSITIVE ANA (ANTINUCLEAR ANTIBODY): ICD-10-CM

## 2023-11-02 DIAGNOSIS — J32.8 OTHER CHRONIC SINUSITIS: ICD-10-CM

## 2023-11-02 DIAGNOSIS — R09.82 POST-NASAL DRAINAGE: ICD-10-CM

## 2023-11-02 DIAGNOSIS — R09.81 NASAL CONGESTION: ICD-10-CM

## 2023-11-02 DIAGNOSIS — R05.3 CHRONIC COUGH: Primary | ICD-10-CM

## 2023-11-02 PROCEDURE — 99213 OFFICE O/P EST LOW 20 MIN: CPT | Performed by: PHYSICIAN ASSISTANT

## 2023-11-02 RX ORDER — FLUTICASONE PROPIONATE 50 MCG
2 SPRAY, SUSPENSION (ML) NASAL DAILY
Qty: 16 G | Refills: 11 | Status: SHIPPED | OUTPATIENT
Start: 2023-11-02

## 2023-11-02 RX ORDER — AMITRIPTYLINE HYDROCHLORIDE 10 MG/1
20 TABLET ORAL AT BEDTIME
Qty: 60 TABLET | Refills: 3 | Status: SHIPPED | OUTPATIENT
Start: 2023-11-02

## 2023-11-02 ASSESSMENT — PAIN SCALES - GENERAL: PAINLEVEL: NO PAIN (0)

## 2023-11-02 NOTE — LETTER
"    11/2/2023         RE: Vesna Fabian  [address]  [place] [address]        Dear Colleague,    Thank you for referring your patient, Vesna Fabian, to the [facility] [place]. Please see a copy of my visit note below.    Chief Complaint   Patient presents with     Follow Up     Follow up/ 1month (hx: positive PARVIZ, chronic cough, nasal drainage)     Vesna Fabian is a 38 year old female seen today for follow up. She has been doing fairly well since her last visit. Her cough overall has improve,d but does report intermittent coughing/ tonsils stones. She had tried water pick but does cause gag reflux.     Pathology report reviewed. No concerns for Sjogren.     She has been using Budesondie rinses with some relief.     Patient reports ongoing concerns of joint pain, fatigue for the last 10 years. She feels symptoms have been more progressive and unable to manage.         Patient reports constant coughing, throat clearing.   Patient denies sore throat or throat pain  Denies chronic otalgia.   Denies fevers, night sweats or weight loss.   Denies dysphagia or dysphonia.   +globus sensation.      Water- adequate  Caffeine- quit  ETOH- beer social.   Tobacco- working on quitting tobacco.   Reflux- Overall no.        Past Medical History:   Diagnosis Date     Attention deficit disorder without mention of hyperactivity 5/31/2000     Bipolar affective disorder 8/9/2012     Bulimia 8/9/2012     Self-injurious behavior 8/9/2012     Suicide attempt 8/9/2012      No Known Allergies  Current Outpatient Medications   Medication     amitriptyline (ELAVIL) 10 MG tablet     budesonide (PULMICORT) 0.5 MG/2ML neb solution     furosemide (LASIX) 20 MG tablet     lisdexamfetamine (VYVANSE) 60 MG capsule     No current facility-administered medications for this visit.     ROS- SEE HPI  /74 (Cuff Size: Adult Large)   Pulse 75   Temp 98  F (36.7  C) (Tympanic)   Ht 1.651 m (5' 5\")   Wt 88.5 kg (195 lb)   LMP " 01/30/2022   SpO2 98%   BMI 32.45 kg/m      General - The patient is well nourished and well developed, and appears to have good nutritional status.  Alert and oriented to person and place, answers questions and cooperates with examination appropriately.   Head and Face - Normocephalic and atraumatic, with no gross asymmetry noted.  The facial nerve is intact, with strong symmetric movements.  Voice and Breathing - The patient was breathing comfortably without the use of accessory muscles. There was no wheezing, stridor, or stertor.  The patients voice was clear and strong, and had appropriate pitch and quality.  Ears -The external auditory canals are patent, the tympanic membranes are intact without effusion, retraction or mass.  Bony landmarks are intact.  Eyes - Extraocular movements intact, and the pupils were reactive to light.  Sclera were not icteric or injected, conjunctiva were pink and moist.  Mouth - Examination of the oral cavity showed pink, healthy oral mucosa. No lesions or ulcerations noted.  The tongue was mobile and midline, and the dentition were in good condition.    Throat - The walls of the oropharynx were smooth, pink, moist, symmetric, and had no lesions or ulcerations.  The tonsillar pillars and soft palate were symmetric.  The uvula was midline on elevation.    Neck - Normal midline excursion of the laryngotracheal complex during swallowing.  Full range of motion on passive movement.  Palpation of the occipital, submental, submandibular, internal jugular chain, and supraclavicular nodes did not demonstrate any abnormal lymph nodes or masses.  Palpation of the thyroid was soft and smooth, with no nodules or goiter appreciated.  The trachea was mobile and midline.  Nose - External contour is symmetric, no gross deflection or scars.  Nasal mucosa is pink and moist with no abnormal mucus.        ASSESSMENT/ PLAN:    ICD-10-CM    1. Chronic cough  R05.3 fluticasone (FLONASE) 50 MCG/ACT nasal  spray     amitriptyline (ELAVIL) 10 MG tablet      2. Post-nasal drainage  R09.82 fluticasone (FLONASE) 50 MCG/ACT nasal spray      3. Positive PARVIZ (antinuclear antibody)  R76.8       4. Nasal congestion  R09.81       5. Other chronic sinusitis  J32.8         Continue with Budesonide rinses. Rinse 1-2 times daily  Start Flonase 2 sprays to each nostril daily.   Increase Amitriptyline 20 mg daily.     Maintain good water intake    Reviewed allergy results, positive to Dust  Consider MQT/ sinus CT in future.     Follow up in 4-6 weeks.         Arline Fernandez PA-C  ENT  [facility], [place]      Again, thank you for allowing me to participate in the care of your patient.        Sincerely,        Arline Fernandez PA-C

## 2023-11-02 NOTE — PROGRESS NOTES
"Chief Complaint   Patient presents with    Follow Up     Follow up/ 1month (hx: positive PARVIZ, chronic cough, nasal drainage)     Vesna Fabian is a 38 year old female seen today for follow up. She has been doing fairly well since her last visit. Her cough overall has improve,d but does report intermittent coughing/ tonsils stones. She had tried water pick but does cause gag reflux.     Pathology report reviewed. No concerns for Sjogren.     She has been using Budesondie rinses with some relief.     Patient reports ongoing concerns of joint pain, fatigue for the last 10 years. She feels symptoms have been more progressive and unable to manage.         Patient reports constant coughing, throat clearing.   Patient denies sore throat or throat pain  Denies chronic otalgia.   Denies fevers, night sweats or weight loss.   Denies dysphagia or dysphonia.   +globus sensation.      Water- adequate  Caffeine- quit  ETOH- beer social.   Tobacco- working on quitting tobacco.   Reflux- Overall no.        Past Medical History:   Diagnosis Date    Attention deficit disorder without mention of hyperactivity 5/31/2000    Bipolar affective disorder 8/9/2012    Bulimia 8/9/2012    Self-injurious behavior 8/9/2012    Suicide attempt 8/9/2012      No Known Allergies  Current Outpatient Medications   Medication    amitriptyline (ELAVIL) 10 MG tablet    budesonide (PULMICORT) 0.5 MG/2ML neb solution    furosemide (LASIX) 20 MG tablet    lisdexamfetamine (VYVANSE) 60 MG capsule     No current facility-administered medications for this visit.     ROS- SEE HPI  /74 (Cuff Size: Adult Large)   Pulse 75   Temp 98  F (36.7  C) (Tympanic)   Ht 1.651 m (5' 5\")   Wt 88.5 kg (195 lb)   LMP 01/30/2022   SpO2 98%   BMI 32.45 kg/m      General - The patient is well nourished and well developed, and appears to have good nutritional status.  Alert and oriented to person and place, answers questions and cooperates with examination " appropriately.   Head and Face - Normocephalic and atraumatic, with no gross asymmetry noted.  The facial nerve is intact, with strong symmetric movements.  Voice and Breathing - The patient was breathing comfortably without the use of accessory muscles. There was no wheezing, stridor, or stertor.  The patients voice was clear and strong, and had appropriate pitch and quality.  Ears -The external auditory canals are patent, the tympanic membranes are intact without effusion, retraction or mass.  Bony landmarks are intact.  Eyes - Extraocular movements intact, and the pupils were reactive to light.  Sclera were not icteric or injected, conjunctiva were pink and moist.  Mouth - Examination of the oral cavity showed pink, healthy oral mucosa. No lesions or ulcerations noted.  The tongue was mobile and midline, and the dentition were in good condition.    Throat - The walls of the oropharynx were smooth, pink, moist, symmetric, and had no lesions or ulcerations.  The tonsillar pillars and soft palate were symmetric.  The uvula was midline on elevation.    Neck - Normal midline excursion of the laryngotracheal complex during swallowing.  Full range of motion on passive movement.  Palpation of the occipital, submental, submandibular, internal jugular chain, and supraclavicular nodes did not demonstrate any abnormal lymph nodes or masses.  Palpation of the thyroid was soft and smooth, with no nodules or goiter appreciated.  The trachea was mobile and midline.  Nose - External contour is symmetric, no gross deflection or scars.  Nasal mucosa is pink and moist with no abnormal mucus.        ASSESSMENT/ PLAN:    ICD-10-CM    1. Chronic cough  R05.3 fluticasone (FLONASE) 50 MCG/ACT nasal spray     amitriptyline (ELAVIL) 10 MG tablet      2. Post-nasal drainage  R09.82 fluticasone (FLONASE) 50 MCG/ACT nasal spray      3. Positive PARVIZ (antinuclear antibody)  R76.8       4. Nasal congestion  R09.81       5. Other chronic  sinusitis  J32.8         Continue with Budesonide rinses. Rinse 1-2 times daily  Start Flonase 2 sprays to each nostril daily.   Increase Amitriptyline 20 mg daily.     Maintain good water intake    Reviewed allergy results, positive to Dust  Consider MQT/ sinus CT in future.     Follow up in 4-6 weeks.         Arline Fernandez PA-C  ENT  [facility], [place]

## 2023-11-02 NOTE — PATIENT INSTRUCTIONS
Continue with Budesonide rinses. Rinse 1-2 times daily  Start Flonase 2 sprays to each nostril daily.   Increase Amitriptyline 20 mg daily.     Maintain good water intake      Thank you for allowing Arline Fernandez PA-C and our ENT team to participate in your care.  If your medications are too expensive, please give the nurse a call.  We can possibly change this medication.  If you have a scheduling or an appointment question please contact our Health Unit Coordinator at [phone], Ext. [phone].    ALL nursing questions or concerns can be directed to your ENT nurse at: [phone] [place]

## 2023-11-13 ENCOUNTER — MYC REFILL (OUTPATIENT)
Dept: FAMILY MEDICINE | Facility: OTHER | Age: 38
End: 2023-11-13

## 2023-11-13 DIAGNOSIS — F90.2 ADHD (ATTENTION DEFICIT HYPERACTIVITY DISORDER), COMBINED TYPE: ICD-10-CM

## 2023-11-13 RX ORDER — LISDEXAMFETAMINE DIMESYLATE 60 MG/1
60 CAPSULE ORAL DAILY
Qty: 30 CAPSULE | Refills: 0 | Status: SHIPPED | OUTPATIENT
Start: 2023-11-13 | End: 2023-12-10

## 2023-11-13 NOTE — TELEPHONE ENCOUNTER
Yana      Last Written Prescription Date:  10.18.23  Last Fill Quantity: #30,   # refills: 0  Last Office Visit: 9.18.23  Future Office visit:    Next 5 appointments (look out 90 days)      Jan 17, 2024  9:15 AM  (Arrive by 9:00 AM)  SHORT with PRUDENCE [place]  [facility] ([facility] [place] ) [address]  [place] [address]  [phone]             Routing refill request to provider for review/approval because:  Drug not on the [facility], [facility] or [facility] refill protocol or controlled substance

## 2023-12-10 ENCOUNTER — MYC REFILL (OUTPATIENT)
Dept: OTOLARYNGOLOGY | Facility: OTHER | Age: 38
End: 2023-12-10

## 2023-12-10 ENCOUNTER — MYC REFILL (OUTPATIENT)
Dept: FAMILY MEDICINE | Facility: OTHER | Age: 38
End: 2023-12-10

## 2023-12-10 DIAGNOSIS — R09.82 POST-NASAL DRAINAGE: ICD-10-CM

## 2023-12-10 DIAGNOSIS — R05.3 CHRONIC COUGH: ICD-10-CM

## 2023-12-10 DIAGNOSIS — F90.2 ADHD (ATTENTION DEFICIT HYPERACTIVITY DISORDER), COMBINED TYPE: ICD-10-CM

## 2023-12-11 NOTE — TELEPHONE ENCOUNTER
Flonase      Last Written Prescription Date:  11/2/23  Last Fill Quantity: 16g,   # refills: 11  Last Office Visit: 11/2/23  Future Office visit:    Next 5 appointments (look out 90 days)      Jan 17, 2024  9:15 AM  (Arrive by 9:00 AM)  SHORT with PRUDENCE [place]  [facility] [place] ([facility] [place] ) [address]  [place] [address]  [phone]             Routing refill request to provider for review/approval because:

## 2023-12-11 NOTE — TELEPHONE ENCOUNTER
Lisdexamfetamine (VYVANSE) 60 mg cap      Last Written Prescription Date:  11/13/23  Last Fill Quantity: 30,   # refills: 0  Last Office Visit: 11/2/23  Future Office visit:    Next 5 appointments (look out 90 days)      Jan 17, 2024  9:15 AM  (Arrive by 9:00 AM)  SHORT with PRUDENCE Braswell  [facility] [place] ([facility] [place] ) [address]  [place] [address]  [phone]

## 2023-12-12 ENCOUNTER — HOSPITAL ENCOUNTER (EMERGENCY)
Facility: HOSPITAL | Age: 38
Discharge: HOME | End: 2023-12-12
Attending: NURSE PRACTITIONER | Admitting: NURSE PRACTITIONER
Payer: COMMERCIAL

## 2023-12-12 VITALS
SYSTOLIC BLOOD PRESSURE: 156 MMHG | BODY MASS INDEX: 32.51 KG/M2 | HEART RATE: 83 BPM | TEMPERATURE: 99.1 F | DIASTOLIC BLOOD PRESSURE: 75 MMHG | WEIGHT: 195.11 LBS | RESPIRATION RATE: 18 BRPM | HEIGHT: 65 IN | OXYGEN SATURATION: 97 %

## 2023-12-12 DIAGNOSIS — R50.9 FEVER: ICD-10-CM

## 2023-12-12 DIAGNOSIS — B34.9 ACUTE VIRAL SYNDROME: ICD-10-CM

## 2023-12-12 DIAGNOSIS — R05.1 ACUTE COUGH: Primary | ICD-10-CM

## 2023-12-12 LAB
FLUAV RNA SPEC QL NAA+PROBE: POSITIVE
FLUBV RNA RESP QL NAA+PROBE: NEGATIVE
RSV RNA SPEC NAA+PROBE: NEGATIVE
SARS-COV-2 RNA RESP QL NAA+PROBE: NEGATIVE

## 2023-12-12 PROCEDURE — 99213 OFFICE O/P EST LOW 20 MIN: CPT | Performed by: NURSE PRACTITIONER

## 2023-12-12 PROCEDURE — 87637 SARSCOV2&INF A&B&RSV AMP PRB: CPT | Performed by: NURSE PRACTITIONER

## 2023-12-12 PROCEDURE — G0463 HOSPITAL OUTPT CLINIC VISIT: HCPCS

## 2023-12-12 PROCEDURE — C9803 HOPD COVID-19 SPEC COLLECT: HCPCS

## 2023-12-12 RX ORDER — LISDEXAMFETAMINE DIMESYLATE 30 MG/1
CAPSULE ORAL
COMMUNITY
Start: 2023-08-17

## 2023-12-12 RX ORDER — BENZONATATE 200 MG/1
200 CAPSULE ORAL 3 TIMES DAILY PRN
Qty: 30 CAPSULE | Refills: 0 | Status: SHIPPED | OUTPATIENT
Start: 2023-12-12

## 2023-12-12 RX ORDER — FLUTICASONE PROPIONATE 50 MCG
2 SPRAY, SUSPENSION (ML) NASAL DAILY
Qty: 16 G | Refills: 11 | OUTPATIENT
Start: 2023-12-12

## 2023-12-12 RX ORDER — LISDEXAMFETAMINE DIMESYLATE 60 MG/1
60 CAPSULE ORAL DAILY
Qty: 30 CAPSULE | Refills: 0 | Status: SHIPPED | OUTPATIENT
Start: 2023-12-12 | End: 2024-01-10

## 2023-12-12 ASSESSMENT — ENCOUNTER SYMPTOMS
VOMITING: 1
SORE THROAT: 0
NAUSEA: 1
FEVER: 1
COUGH: 1
DIARRHEA: 1
APPETITE CHANGE: 1
ABDOMINAL PAIN: 1

## 2023-12-12 NOTE — DISCHARGE INSTRUCTIONS
Take the tessalon Perles as prescribed for your cough.     We will notify you of your results when available.     Follow up with your doctor if no improvement in symptoms.    Return to emergency department for worsening or concerning symptoms.

## 2023-12-12 NOTE — ED PROVIDER NOTES
History     Chief Complaint   Patient presents with    Cough    Fever     Cough, fever     HPI  Vesna Fabian is a 38 year old female who presents to urgent care for evaluation of a fever.  2 days ago patient states that she developed a cough and a fever.  Now she has developed diarrhea as well as vomiting.  She has had 3 episodes of emesis today.  She is drinking fluids and able to keep them down.  She does have abdominal pain to both sides of her stomach.  No chest pain.  Shortness of breath when coughing.  Current tobacco use.  No history of asthma or COPD.    Allergies:  No Known Allergies    Problem List:    Patient Active Problem List    Diagnosis Date Noted    S/P hysterectomy 04/06/2022     Priority: Medium     LAVH      Decreased ferritin 01/08/2021     Priority: Medium    Ligament tear 06/24/2020     Priority: Medium     Added automatically from request for surgery [id number]      ACP (advance care planning) 03/10/2016     Priority: Medium     Advance Care Planning 3/10/2016: ACP Review of Chart / Resources Provided:  Reviewed chart for advance care plan.  Vesna Fabian has been provided information and resources to begin or update their advance care plan.  Added by Kenisha Chilel            NO SHOW 06/18/2015     Priority: Medium     No shows for Dr. Valdez : 5/15/13; 4/29/15        ASCUS (atypical squamous cells of undetermined significance) on Pap smear 04/29/2014     Priority: Medium    Encounter for routine gynecological examination 04/21/2014     Priority: Medium     Problem list name updated by automated process. Provider to review      Decreased libido 04/21/2014     Priority: Medium     Sensate focus      Fatigue 04/21/2014     Priority: Medium    Smoker 04/21/2014     Priority: Medium     Ready to quit. Only 3 cigs in a week      Family planning 04/22/2013     Priority: Medium    Moderate major depression (H) 04/22/2013     Priority: Medium     Denies suicidal or homicidal ideations at this  time. Working with RAÚ[employer] Molina      Anxiety 04/22/2013     Priority: Medium    Chronic fatigue 04/22/2013     Priority: Medium    Bipolar disorder, unspecified (H) 08/09/2012     Priority: Medium        Past Medical History:    Past Medical History:   Diagnosis Date    Attention deficit disorder without mention of hyperactivity 5/31/2000    Bipolar affective disorder 8/9/2012    Bulimia 8/9/2012    Self-injurious behavior 8/9/2012    Suicide attempt 8/9/2012       Past Surgical History:    Past Surgical History:   Procedure Laterality Date    cyst ovary      LT    CYSTOSCOPY N/A 04/06/2022    Procedure: Cystoscopy;  Surgeon: Juan Alberto Cain MD;  Location: [facility]    HYSTERECTOMY, PAP NO LONGER INDICATED N/A 04/06/2022    Cervix removed.    LAPAROSCOPIC ASSISTED HYSTERECTOMY VAGINAL N/A 04/06/2022    Procedure: LAPAROSCOPIC ASSISTED VAGINAL HYSTERECTOMY;  Surgeon: Juan Alberto Cain MD;  Location: [facility]    MAMMOPLASTY REDUCTION  01/01/2011    REPAIR TENDON PATELLA Left 07/10/2020    Procedure: Left knee patellorfemoral ligament repair;  Surgeon: Melchor Perkins DO;  Location: [facility]    SALPINGECTOMY Bilateral 04/06/2022    Procedure: BILATERAL SALPINGECTOMY;  Surgeon: Juan Alberto Cain MD;  Location: [facility]    wisdom teeth removed         Family History:    Family History   Problem Relation Age of Onset    Cancer Maternal Grandmother         pancreatic cancer; cause of death    Cancer Father         rectal    Colon Cancer Father     Heart Disease Paternal Grandmother         heart disease    Hypertension Mother        Social History:  Marital Status:  Single [1]  Social History     Tobacco Use    Smoking status: Former     Years: 10     Types: Cigarettes     Quit date: 1/19/2020     Years since quitting: 3.8    Smokeless tobacco: Never    Tobacco comments:     quit 2011. longest tobacco free 1 year. no passive exposure   Vaping Use    Vaping Use: Never used   Substance Use Topics    Alcohol use: Yes     Comment: rarely    Drug  "use: No        Medications:    amitriptyline (ELAVIL) 10 MG tablet  amitriptyline (ELAVIL) 10 MG tablet  benzonatate (TESSALON) 200 MG capsule  budesonide (PULMICORT) 0.5 MG/2ML neb solution  fluticasone (FLONASE) 50 MCG/ACT nasal spray  furosemide (LASIX) 20 MG tablet  lisdexamfetamine (VYVANSE) 60 MG capsule  VYVANSE 30 MG capsule          Review of Systems   Constitutional:  Positive for appetite change and fever.   HENT:  Negative for sore throat.    Respiratory:  Positive for cough.    Gastrointestinal:  Positive for abdominal pain, diarrhea, nausea and vomiting.   All other systems reviewed and are negative.      Physical Exam   BP: 156/75  Pulse: 83  Temp: 99.1  F (37.3  C)  Resp: 18  Height: 165.1 cm (5' 5\")  Weight: 88.5 kg (195 lb 1.7 oz)  SpO2: 97 %      Physical Exam  Vitals and nursing note reviewed.   Constitutional:       General: She is not in acute distress.     Appearance: Normal appearance. She is well-developed. She is not ill-appearing.   HENT:      Head: Normocephalic and atraumatic.      Right Ear: Tympanic membrane and ear canal normal.      Left Ear: Tympanic membrane and ear canal normal.      Nose: Congestion present.      Mouth/Throat:      Mouth: Mucous membranes are moist.   Eyes:      Conjunctiva/sclera: Conjunctivae normal.   Cardiovascular:      Rate and Rhythm: Normal rate and regular rhythm.      Heart sounds: Normal heart sounds.   Pulmonary:      Effort: Pulmonary effort is normal. No respiratory distress.      Breath sounds: Normal breath sounds. No wheezing, rhonchi or rales.   Abdominal:      General: Abdomen is flat.      Tenderness: There is generalized abdominal tenderness. There is no guarding or rebound.   Musculoskeletal:         General: Normal range of motion.      Cervical back: Normal range of motion and neck supple.   Skin:     General: Skin is warm and dry.      Capillary Refill: Capillary refill takes less than 2 seconds.      Coloration: Skin is not pale. "   Neurological:      Mental Status: She is alert and oriented to person, place, and time.         ED Course                 Procedures         No results found for this or any previous visit (from the past 24 hour(s)).    Medications - No data to display    Assessments & Plan (with Medical Decision Making)   38-year-old female with fever, cough and GI symptoms that started 2 days ago.  Her respirations are nonlabored.  Oxygen saturation is 97% on room air.  Her respirations are even and unlabored.  She has a soft abdomen with generalized tenderness to palpation.  Patient was tested for COVID-19, influenza and RSV with results pending at discharge.  She declined any antiemetics.  She requested something for her cough.  His symptoms are likely due to a viral illness.  She will be treated with benzonatate.  Recommended Tylenol or ibuprofen as needed for the fever or pain.  Push fluids especially with vomiting and diarrhea.  Follow-up with primary doctor if no improvement in symptoms.  Return to urgent care or emergency department for any worsening or concerning symptoms.    I have reviewed the nursing notes.    I have reviewed the findings, diagnosis, plan and need for follow up with the patient.  This document was prepared using a combination of typing and voice generated software.  While every attempt was made for accuracy, spelling and grammatical errors may exist.         New Prescriptions    BENZONATATE (TESSALON) 200 MG CAPSULE    Take 1 capsule (200 mg) by mouth 3 times daily as needed for cough       Final diagnoses:   Acute cough   Fever   Acute viral syndrome       12/12/2023   [facility]       Mpofu, Prudence, CNP  12/12/23 [id number]

## 2023-12-12 NOTE — Clinical Note
Vesna Fabian was seen and treated in our emergency department on 12/12/2023.  She may return to work on 12/18/2023.       If you have any questions or concerns, please don't hesitate to call.      Mpofu, Prudence, CNP

## 2023-12-12 NOTE — ED TRIAGE NOTES
Pt presents with c/o cough, fever, diarrhea, emesis (3x today). Sx started Sunday. Denies known exposures. Pt has been taking ibuprofen and robitussin DM.

## 2024-01-10 ENCOUNTER — MYC REFILL (OUTPATIENT)
Dept: FAMILY MEDICINE | Facility: OTHER | Age: 39
End: 2024-01-10

## 2024-01-10 DIAGNOSIS — F90.2 ADHD (ATTENTION DEFICIT HYPERACTIVITY DISORDER), COMBINED TYPE: ICD-10-CM

## 2024-01-10 RX ORDER — LISDEXAMFETAMINE DIMESYLATE 60 MG/1
60 CAPSULE ORAL DAILY
Qty: 30 CAPSULE | Refills: 0 | Status: SHIPPED | OUTPATIENT
Start: 2024-01-10 | End: 2024-02-05

## 2024-01-10 NOTE — TELEPHONE ENCOUNTER
lisdexamfetamine (VYVANSE) 60 MG capsule         Last Written Prescription Date:  12/12/2023  Last Fill Quantity: 30,   # refills: 0  Last Office Visit: 9/18/2023  Future Office visit:    Next 5 appointments (look out 90 days)      Jan 17, 2024  9:15 AM  (Arrive by 9:00 AM)  SHORT with PRUDENCE Braswell  [facility] ([facility] [place] ) [address] [place] [address] [address]VE  [place] [address]  [phone]             Routing refill request to provider for review/approval because:    Kimberly Boecker, RN

## 2024-01-31 NOTE — PROGRESS NOTES
EMERGENCY DEPARTMENT ENCOUNTER  Room Number:  35/35  PCP: Sahil Cornelius MD  Independent Historians: Patient, EMS who brought her      HPI:  Chief Complaint: had concerns including Back Pain.     A complete HPI/ROS/PMH/PSH/SH/FH are unobtainable due to:   Chronic or social conditions impacting patient care (Social Determinants of Health):       Context: Belen Allen is a 40 y.o. female with a medical history of chronic back pain, seizure disorder who presents to the ED c/o acute low back pain.  Patient's pain increased over the last several days.  She denies any recent injury.  She has had several falls at home.  The pain radiates down into both legs.  She has some tingling and numbness particularly in the right leg.  Has not had incontinence of bowel or bladder.  Denies recent fever.  Surgery done in the past by Dr. Rosenberg but she has not seen him for more than 3 years.  Denies usage of pain medications for her back.  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      Review of prior external notes (non-ED) -and- Review of prior external test results outside of this encounter:   I reviewed prior medical records including urgent care note from several days ago where she was seen for sore throat and nausea.  Patient last hospitalized in July.  She has history of chronic back pain, migraine, seizures and unspecified psychiatric disorder.  I reviewed most recent MRI from December 2022, just over 1 year ago which showed fairly stable findings with noted surgery and orthopedic hardware.        Prescription drug monitoring program review: AMARJIT reviewed by Gold Meek MD   N/A and AMARJIT query complete and reviewed. Treatment plan to include limited course of prescribed controlled substance. Risks including addiction, benefits, and alternatives presented to patient.    PAST MEDICAL HISTORY  Active Ambulatory Problems     Diagnosis Date Noted    Splenic infarct 11/08/2020    Anxiety disorder  "  PSYCHIATRY CLINIC PROGRESS NOTE   20 minute medication management, more than 50% of time spent counseling patient on medications, medication side effects, symptom history and management   SUBJECTIVE / INTERIM HISTORY                                                                        Social- with parents and Rafael Children- Rafael is 11 yo daughter     Last visit August: Continue vyanse 60 mg daily and last script was 11/3/ so gave 3 scripts today 12/1, 12/29 and 1/ 26/18. 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   - off Lamictal hasn't been able to get from pharmacy: has to go to [place] because insurance to get it  - was off Vyvanse for awhile - couldn't focus, wasn't able to get her work done at [employer]. \"my brain won't shut off\"  - moved in with parents try help given now  and working 60+ hours per week.  - WAS dating Sukh, they broke up Jan '17    SUBSTANCE USE- reports no issues    SYMPTOMS-   SIB: Burning (last was in Oct '15) , Overwhelming, irritability, SIB  were becoming more of an issue again so she started working with therapist Hugh Uribe.  Irritability gotten better and moods bit better. Stress with uncertainly, lack of control  MEDICAL ROS-  Fatigue, headahces, constipation  MEDICAL / SURGICAL HISTORY                pregnant [if applicable]--no     Patient Active Problem List   Diagnosis     Family planning     Moderate major depression (H)     Anxiety     Chronic fatigue     Encounter for routine gynecological examination     Decreased libido     Fatigue     Smoker     ASCUS (atypical squamous cells of undetermined significance) on Pap smear     NO SHOW     ACP (advance care planning)     ALLERGY   Dust mites  MEDICATIONS                                                                                             Current Outpatient Prescriptions   Medication Sig     " 11/09/2020    Functional asplenia 11/10/2020    Abdominal pain 11/29/2020    Acute bilateral low back pain 11/29/2020    Antiphospholipid antibody positive 11/29/2020    On anticoagulant therapy 11/29/2020    Acute pain of left knee 11/29/2020    Vitamin D deficiency 11/30/2020    Folate deficiency 12/01/2020    Pain of back and left lower extremity 12/02/2020    Common bile duct dilatation 05/31/2021    Elevated LFTs 06/01/2021    Right upper quadrant abdominal pain 06/01/2021    Obesity (BMI 30-39.9) 04/18/2019    Tobacco abuse 06/02/2021    GERD without esophagitis 06/02/2021    Intractable abdominal pain 07/05/2021    Class 2 severe obesity with serious comorbidity in adult 07/05/2021    Constipation 07/05/2021    History of ERCP 07/05/2021    Chronic pain syndrome 07/05/2021    Seizures 01/01/2022    Syncope 01/01/2022    Lumbar back pain with radiculopathy affecting left lower extremity 01/05/2022    Right upper quadrant pain 11/23/2022    Uncontrolled pain 12/17/2022    Intractable back pain 12/17/2022    Sciatica of right side 12/20/2022    Migraine 12/20/2022    Mobility impaired 12/20/2022    Seizure 12/31/2022    Opioid dependence 01/16/2023    Clostridioides difficile diarrhea 01/18/2023    Pulmonary edema 07/27/2023    Acute hypoxemic respiratory failure 07/29/2023    Other dysphagia 08/01/2023    Vomiting 08/02/2023     Resolved Ambulatory Problems     Diagnosis Date Noted    Choledocholithiasis 06/02/2021    Rhabdomyolysis 01/01/2022    Encephalopathy 01/01/2022    Hypokalemia 12/18/2022    Opioid withdrawal 07/12/2023     Past Medical History:   Diagnosis Date    Abnormal Pap smear of cervix     Ankle fracture 2013    H/O Elevated liver enzymes     History of chronic back pain     History of migraine     History of urinary tract infection     Injury of back     PONV (postoperative nausea and vomiting)     Seasonal allergies          PAST SURGICAL HISTORY  Past Surgical History:   Procedure Laterality  lisdexamfetamine (VYVANSE) 60 MG capsule Take 1 capsule (60 mg) by mouth every morning     No current facility-administered medications for this visit.        VITALS   /88 (BP Location: Left arm, Patient Position: Sitting, Cuff Size: Adult Regular)  Pulse 91  Temp 99.2  F (37.3  C) (Tympanic)  Wt 146 lb (66.2 kg)  BMI 24.3 kg/m2     PHQ9                     [unfilled]  LABS                                                                                                                           TSH   Date Value Ref Range Status   04/21/2014 1.26 0.34 - 4.82 mU/L Final   ]    CBC RESULTS:   Recent Labs   Lab Test  04/21/14   1113   WBC  7.9   RBC  5.06   HGB  14.9   HCT  43.6   MCV  86   MCH  29.4   MCHC  34.2   RDW  13.2   PLT  380       MENTAL STATUS EXAM                                                                                        Alert. Oriented to person, place, and date / time. Well groomed, calm, cooperative with good eye contact. No problems with speech or psychomotor behavior. Mood was described as stressed and affect was congruent to speech content and full range. Thought process, including associations, was unremarkable and thought content was devoid of suicidal and homicidal ideation and psychotic thought. No hallucinations. Insight was good. Judgment was intact and adequate for safety. Fund of knowledge was intact. Pt demonstrates no obvious problems with attention, concentration, language, recent or remote memory although these were not formally tested.     ASSESSMENT                                                                                                      HISTORICAL:  Initial psych note 4/15/16         NOTES:   Notes: Luvox sedation     Vesna Fabian is a 31 yo with psychiatric hx of : depression, anxiety, ADHD, one hospital stay here at  around 2013. Was working with Hugh Uribe for psychotherapy several months on some trauma issues that happened and she has never  Date    BACK SURGERY  2006    fusion L4, L5      CHOLECYSTECTOMY  2014    DILATATION AND CURETTAGE  2009    ENDOSCOPY N/A 11/27/2022    Procedure: ESOPHAGOGASTRODUODENOSCOPY with biopsy;  Surgeon: Christiana Mann MD;  Location: [facility] ENDOSCOPY;  Service: Gastroenterology;  Laterality: N/A;  gastritis, duodenitis, irregular z line    ERCP N/A 6/3/2021    Procedure: ENDOSCOPIC RETROGRADE CHOLANGIOPANCREATOGRAPHY WITH SPHINCTEROTOMY, DILATION WITH BALLOON CLEARANCE  (12MM-15MM);  Surgeon: Bjorn Flannery MD;  Location: [facility] ENDOSCOPY;  Service: Gastroenterology;  Laterality: N/A;  DILATED COMMON BILE DUCT    SKIN SURGERY      TUBAL ABDOMINAL LIGATION  2013    WISDOM TOOTH EXTRACTION  2018    x2         FAMILY HISTORY  Family History   Problem Relation Age of Onset    Stroke Mother     Allergic rhinitis Mother     Allergic rhinitis Sister     Breast cancer Maternal Aunt     Cancer Maternal Grandmother     Allergic rhinitis Paternal Grandfather     Cancer Paternal Grandfather     Prostate cancer Paternal Grandfather          SOCIAL HISTORY  Social History     Socioeconomic History    Marital status: Single    Number of children: 2   Tobacco Use    Smoking status: Every Day     Packs/day: .5     Types: Cigarettes    Smokeless tobacco: Never   Vaping Use    Vaping Use: Never used   Substance and Sexual Activity    Alcohol use: Not Currently    Drug use: Not Currently    Sexual activity: Defer         ALLERGIES  Morphine and Lidocaine      REVIEW OF SYSTEMS  Review of Systems   Constitutional:  Negative for fever.   Respiratory:  Negative for shortness of breath.    Cardiovascular:  Negative for chest pain.   Genitourinary:         Patient confident she is not pregnant she has had tubal ligation and normal recent last menstrual period   Musculoskeletal:  Positive for back pain.   All other systems reviewed and are negative.    Included in HPI  All systems reviewed and negative except for those discussed in  discussed with others. Initial vist she ntoed Luvox didn't go well thus we agreed on d/c. We will continue vyvanse and for now Vesna doesn't want to get back on Lamictal so I held off on restarting it.    She is interested in smoking cessation so we are going to have her try patches.     TREATMENT RISK STATEMENT:  The risks, benefits, alternatives and potential adverse effects have been explained and are understood by the pt.  The pt agrees to the treatment plan with the ability to do so.   The pt knows to call the clinic for any problems or access emergency care if needed.        DIAGNOSES                 (Use of Axes system will continue, even though absent from DSM 5)          MDD, recurrent, mild  ADHD       PLAN                                                                                                                    1)  MEDICATIONS:         -- Continue vyanse 60 mg daily and last script 1/26/18. Gave scripts today 2/28/18, 3/28/18, and 4/25/18  2)  THERAPY:  No Change. Working with Hugh Uribe    3)  LABS:  None    4)  PT MONITOR [call for probs]:  Worsening symptoms, SI/HI, SEs from meds    5)  REFERRALS [CD, medical, other]:  None    6)  RTC:    ~3 months                                                                         HPI.      PHYSICAL EXAM    I have reviewed the triage vital signs and nursing notes.    ED Triage Vitals [01/31/24 1153]   Temp Heart Rate Resp BP SpO2   98.8 °F (37.1 °C) 103 16 139/85 99 %      Temp src Heart Rate Source Patient Position BP Location FiO2 (%)   Oral Monitor -- -- --       Physical Exam  GENERAL: Alert female in mild distress.  Triage vitals reviewed and are notable for initial pulse of 103.  Temperature is afebrile at 98.8.  Blood pressure 139/85.  SKIN: Warm, dry  HENT: Normocephalic, atraumatic  EYES: no scleral icterus  CV: regular rhythm, regular rate  RESPIRATORY: normal effort, lungs clear  ABDOMEN: soft, nontender, nondistended  MUSCULOSKELETAL: Spine-diffuse tenderness palpation of the lumbar spine.  Range of motion is limited secondary to pain.  Upper extremities-unremarkable, normal bony exam  Lower extremities-unremarkable no noted bony deformity.  There is increased pain into the lower back with elevation of the right greater than left leg.  NEURO: alert, moves all extremities, follows commands  Strength is grossly intact in both legs.  There is decreased light touch along through the right leg.    LAB RESULTS  No results found for this or any previous visit (from the past 24 hour(s)).      RADIOLOGY  CT Lumbar Spine Without Contrast    Result Date: 1/31/2024  CT SCAN OF THE LUMBAR SPINE WITHOUT CONTRAST ON 01/31/2024  CLINICAL HISTORY: Patient has a history of surgery with fusion at L5-S1 and has back pain that radiates into both legs. The patient personally reports back pain radiating to right hip and leg.  TECHNIQUE: Spiral CT images were obtained from the T11 thoracic level down to the S3 sacral level and the images were reformatted and submitted in 3 mm thick axial CT sections with soft tissue algorithm and 2 mm thick sagittal and coronal reconstructions were performed and submitted in soft tissue algorithm and additional reconstructions were submitted in 1 mm thick axial, sagittal  and coronal CT sections with high-resolution bone algorithm.  This is correlated to a prior lumbar spine CT from [facility] on 04/26/2023 and an MRI of the lumbar spine on 12/18/2022.  FINDINGS: At T11-12, there is some anterior marginal endplate spurring. The posterior disc margin and facets are normal and there is no canal or foraminal narrowing.  T12-L1, the disc space and facets are normal in appearance with no canal or foraminal narrowing.  At L1-2, the disc space and facets are normal in appearance with no canal or foraminal narrowing.  At L2-3, the disc space and facets are normal in appearance with no canal or foraminal narrowing.  At L3-4, the disc space and facets are normal in appearance with no canal or foraminal narrowing.  At L4-5, there is mild bilateral facet overgrowth, a very minimal posterior disc bulge. There is no canal or foraminal narrowing.  At L5-S1, this patient has had prior lumbar spine surgery with bilateral laminectomies at L5 and a left partial facetectomy at L5-S1. There are bilateral rods bridging the posterior elements at L5-S1 anchored by bilateral pedicle screws at L5 and S1. There is a disc implant in the L5-S1 disc space. There is perhaps very minimal bony bridging across the endplates. I see no canal or lateral recess or left foraminal narrowing. There is some new bone formation along the tract for the placement of the disc implant along the right posterolateral to foraminal disc margin. The new bone formation extends into the inferior half of the right foramen and moderately narrows the right foramen at L5-S1. It may abut the undersurface of the exiting right L5 nerve root.      1. No significant interval change when compared to prior lumbar spine CT on 04/26/2023. 2. This patient has had prior surgery at L5-S1 with bilateral laminectomies at L5 and a medial right facetectomy at L5-S1 and a partial left facetectomy at L5-S1, and there are rods bridging the  posterior elements at L5-S1 anchored by bilateral pedicle screws at L5 and S1, and there is a disc implant in the L5-S1 disc space, and there is good alignment of the hardware. There may be some minimal bony bridging across the endplates. There is some new bone formation along the tract for the disc implant placement extending along the right posterolateral and the right foraminal disc margin and the new bone formation in the inferior aspect of the right neural foramen at L5-S1 moderately narrows the right foramen and abuts the undersurface of the exiting right L5 nerve root. The new bone formation also contacts the lateral margin of the traversing right S1 nerve root. There is no canal or left lateral recess or left foraminal narrowing. The remainder of the lumbar spine CT is unremarkable.  Radiation dose reduction techniques were utilized, including automated exposure control and exposure modulation based on body size.          MEDICATIONS GIVEN IN ER  Medications   HYDROmorphone (DILAUDID) injection 0.5 mg (has no administration in time range)   methylPREDNISolone sodium succinate (SOLU-Medrol) injection 125 mg (125 mg Intravenous Given 1/31/24 1246)   HYDROmorphone (DILAUDID) injection 1 mg (1 mg Intravenous Given 1/31/24 1249)   ketorolac (TORADOL) injection 15 mg (15 mg Intravenous Given 1/31/24 1248)   ondansetron (ZOFRAN) injection 4 mg (4 mg Intravenous Given 1/31/24 1244)         ORDERS PLACED DURING THIS VISIT:  Orders Placed This Encounter   Procedures    CT Lumbar Spine Without Contrast         OUTPATIENT MEDICATION MANAGEMENT:  Current Facility-Administered Medications Ordered in Epic   Medication Dose Route Frequency Provider Last Rate Last Admin    HYDROmorphone (DILAUDID) injection 0.5 mg  0.5 mg Intravenous Once ToyahGold aguilar MD         Current Outpatient Medications Ordered in Epic   Medication Sig Dispense Refill    dicyclomine (BENTYL) 20 MG tablet Take 1 tablet by mouth Every 6 (Six) Hours.  20 tablet 0    FLUoxetine (PROzac) 40 MG capsule Take 60 mg by mouth Every Night.      levETIRAcetam (KEPPRA) 1000 MG tablet Take 2 tablets by mouth Every 12 (Twelve) Hours for 30 days. 120 tablet 0    melatonin 5 MG tablet tablet Take 2 tablets by mouth Every Night. Patient taes 20 mg at home      methylPREDNISolone (MEDROL) 4 MG dose pack Take as directed on package instructions. 21 each 0    oxyCODONE-acetaminophen (PERCOCET) 5-325 MG per tablet Take 1-2 tablets by mouth Every 6 (Six) Hours As Needed (pain). 12 tablet 0    promethazine (PHENERGAN) 25 MG tablet Take 1 tablet by mouth Every 6 (Six) Hours As Needed for Nausea or Vomiting. 10 tablet 0    traZODone (DESYREL) 50 MG tablet Take 1-2 tablets by mouth At Night As Needed for sleep 30 tablet 3    Vitamin B-2 (RIBOFLAVIN) 100 MG tablet tablet Take 4 tablets by mouth Daily. 30 tablet 3         PROCEDURES  Procedures            PROGRESS, DATA ANALYSIS, CONSULTS, AND MEDICAL DECISION MAKING  All labs have been independently interpreted by me.  All radiology studies have been reviewed by me. All EKG's have been independently viewed and interpreted by me.  Discussion below represents my analysis of pertinent findings related to patient's condition, differential diagnosis, treatment plan and final disposition.      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    On exam she is afebrile and well-appearing.  She does have limited range of motion secondary to pain.  No marked weakness.  There is some decreased light touch in the right leg.    Assessment-suspect acute lumbar radiculopathy.  I do not see red flags to suggest need for emergent neurosurgical intervention.  I do not think were dealing with epidural abscess as patient is afebrile and does not use IV drugs.    Will treat with IV Dilaudid and Zofran as  "well as some IV Solu-Medrol.  Will get CT imaging of the lumbar spine. [DB]   1322 I independently interpreted CT imaging of the lumbar spine.  Patient has DJD and prior surgical changes.    I discussed results with Dr. Micha Valdivia.  He reports that they are essentially unchanged from CT images obtained in April of last year.  Patient does have some bony hypertrophy around the L5-S1 area that is not significantly changed.    Suspect we are dealing with exacerbation of chronic back pain with acute lumbar radiculopathy. [DB]      ED Course User Index  [DB] Gold Meek MD             AS OF 14:18 EST VITALS:    BP - 133/95  HR - 96  TEMP - 98.8 °F (37.1 °C) (Oral)  O2 SATS - 96%    COMPLEXITY OF CARE  Complicated patient with multiple medical problems including seizure disorder, anxiety disorder and chronic low back pain.    She presents with worsening pain over the last several days.  Currently not on any medications for back pain.  Had been on gabapentin in August but no longer taking.    On exam I see no \"red flags\" to suggest need for emergent neurosurgery evaluation.    CT imaging is not significant change when compared to prior studies in April of this year.  She had an MRI just over 1 year ago that was unchanged from baseline.    Suspect we are dealing with exacerbation of chronic back pain and will treat with short course of narcotic pain medications, steroid pack and nausea medicine.  Patient to follow-up with primary care provider.  She may need to go back on gabapentin which she has taken in the past.      DIAGNOSIS  Final diagnoses:   Lumbar radiculopathy         DISPOSITION  ED Disposition       ED Disposition   Discharge    Condition   Stable    Comment   --                Please note that portions of this document were completed with a voice recognition program.    Note Disclaimer: At [facility], we believe that sharing information builds trust and better relationships. You are receiving this note " because you recently visited [facility]. It is possible you will see health information before a provider has talked with you about it. This kind of information can be easy to misunderstand. To help you fully understand what it means for your health, we urge you to discuss this note with your provider.         Gold Meek MD  01/31/24 [id number]

## 2024-02-05 ENCOUNTER — MYC REFILL (OUTPATIENT)
Dept: FAMILY MEDICINE | Facility: OTHER | Age: 39
End: 2024-02-05

## 2024-02-05 DIAGNOSIS — F90.2 ADHD (ATTENTION DEFICIT HYPERACTIVITY DISORDER), COMBINED TYPE: ICD-10-CM

## 2024-02-05 NOTE — TELEPHONE ENCOUNTER
Yana      Last Written Prescription Date:  1.10.24  Last Fill Quantity: #30,   # refills: 0  Last Office Visit: 9.18.23  Future Office visit:       Routing refill request to provider for review/approval because:  Drug not on the [facility], P or [facility] refill protocol or controlled substance

## 2024-02-06 RX ORDER — LISDEXAMFETAMINE DIMESYLATE 60 MG/1
60 CAPSULE ORAL DAILY
Qty: 30 CAPSULE | Refills: 0 | Status: SHIPPED | OUTPATIENT
Start: 2024-02-06 | End: 2024-03-05

## 2024-02-09 ENCOUNTER — TELEPHONE (OUTPATIENT)
Dept: FAMILY MEDICINE | Facility: OTHER | Age: 39
End: 2024-02-09

## 2024-02-09 DIAGNOSIS — F90.2 ADHD (ATTENTION DEFICIT HYPERACTIVITY DISORDER), COMBINED TYPE: Primary | ICD-10-CM

## 2024-02-09 DIAGNOSIS — F41.9 ANXIETY: ICD-10-CM

## 2024-02-09 DIAGNOSIS — F32.9 MAJOR DEPRESSION: ICD-10-CM

## 2024-02-09 NOTE — TELEPHONE ENCOUNTER
9:04 AM    Reason for Call: Phone Call    Description: pt called she is wanting to get a referral to see psychiatry pt stated that she had a referral a while ago and is not sure if she is needing another one, pt is hoping to get this soon so she can start making appts     Was an appointment offered for this call? No  If yes : Appointment type              Date    Preferred method for responding to this message: Telephone Call  What is your phone number ?[phone] anytime today    If we cannot reach you directly, may we leave a detailed response at the number you provided? Yes    Can this message wait until your PCP/provider returns, if available today? Not applicable    Tyra Segovia

## 2024-02-18 ENCOUNTER — HEALTH MAINTENANCE LETTER (OUTPATIENT)
Age: 39
End: 2024-02-18

## 2024-03-05 ENCOUNTER — MYC REFILL (OUTPATIENT)
Dept: FAMILY MEDICINE | Facility: OTHER | Age: 39
End: 2024-03-05

## 2024-03-05 DIAGNOSIS — F90.2 ADHD (ATTENTION DEFICIT HYPERACTIVITY DISORDER), COMBINED TYPE: ICD-10-CM

## 2024-03-05 NOTE — TELEPHONE ENCOUNTER
Disp Refills Start End LORE   lisdexamfetamine (VYVANSE) 60 MG capsule 30 capsule 0 2/6/2024 -- No     Last Office Visit: 09/18/2023  Future Office visit:       Routing refill request to provider for review/approval because:

## 2024-03-07 RX ORDER — LISDEXAMFETAMINE DIMESYLATE 60 MG/1
60 CAPSULE ORAL DAILY
Qty: 30 CAPSULE | Refills: 0 | Status: SHIPPED | OUTPATIENT
Start: 2024-03-07 | End: 2024-04-03

## 2024-03-19 ENCOUNTER — TELEPHONE (OUTPATIENT)
Dept: AUDIOLOGY | Facility: OTHER | Age: 39
End: 2024-03-19

## 2024-03-19 ENCOUNTER — OFFICE VISIT (OUTPATIENT)
Dept: OTOLARYNGOLOGY | Facility: OTHER | Age: 39
End: 2024-03-19
Attending: NURSE PRACTITIONER
Payer: COMMERCIAL

## 2024-03-19 VITALS
DIASTOLIC BLOOD PRESSURE: 86 MMHG | HEART RATE: 69 BPM | OXYGEN SATURATION: 97 % | RESPIRATION RATE: 18 BRPM | SYSTOLIC BLOOD PRESSURE: 140 MMHG | HEIGHT: 65 IN | WEIGHT: 195.11 LBS | BODY MASS INDEX: 32.51 KG/M2 | TEMPERATURE: 98 F

## 2024-03-19 DIAGNOSIS — H93.8X3 SENSATION OF PLUGGED EAR ON BOTH SIDES: Primary | ICD-10-CM

## 2024-03-19 DIAGNOSIS — R05.3 CHRONIC COUGH: ICD-10-CM

## 2024-03-19 DIAGNOSIS — R09.82 POST-NASAL DRAINAGE: ICD-10-CM

## 2024-03-19 DIAGNOSIS — H93.13 TINNITUS, BILATERAL: ICD-10-CM

## 2024-03-19 DIAGNOSIS — G43.909 MIGRAINE WITHOUT STATUS MIGRAINOSUS, NOT INTRACTABLE, UNSPECIFIED MIGRAINE TYPE: ICD-10-CM

## 2024-03-19 PROCEDURE — 92504 EAR MICROSCOPY EXAMINATION: CPT | Performed by: NURSE PRACTITIONER

## 2024-03-19 PROCEDURE — 99213 OFFICE O/P EST LOW 20 MIN: CPT | Mod: 25 | Performed by: NURSE PRACTITIONER

## 2024-03-19 RX ORDER — IPRATROPIUM BROMIDE 21 UG/1
2 SPRAY, METERED NASAL 2 TIMES DAILY
Qty: 30 ML | Refills: 11 | Status: SHIPPED | OUTPATIENT
Start: 2024-03-19

## 2024-03-19 ASSESSMENT — ANXIETY QUESTIONNAIRES
IF YOU CHECKED OFF ANY PROBLEMS ON THIS QUESTIONNAIRE, HOW DIFFICULT HAVE THESE PROBLEMS MADE IT FOR YOU TO DO YOUR WORK, TAKE CARE OF THINGS AT HOME, OR GET ALONG WITH OTHER PEOPLE: NOT DIFFICULT AT ALL
4. TROUBLE RELAXING: NOT AT ALL
5. BEING SO RESTLESS THAT IT IS HARD TO SIT STILL: NOT AT ALL
GAD7 TOTAL SCORE: 0
6. BECOMING EASILY ANNOYED OR IRRITABLE: NOT AT ALL
1. FEELING NERVOUS, ANXIOUS, OR ON EDGE: NOT AT ALL
2. NOT BEING ABLE TO STOP OR CONTROL WORRYING: NOT AT ALL
3. WORRYING TOO MUCH ABOUT DIFFERENT THINGS: NOT AT ALL
7. FEELING AFRAID AS IF SOMETHING AWFUL MIGHT HAPPEN: NOT AT ALL
GAD7 TOTAL SCORE: 0

## 2024-03-19 ASSESSMENT — PAIN SCALES - GENERAL: PAINLEVEL: NO PAIN (0)

## 2024-03-19 NOTE — PATIENT INSTRUCTIONS
Increase amitriptyline to 25 mg at bedtime x 1 week, then 50 mg at bedtime if tolerating well, refills sent.   Complete audiogram   Follow up in 3 weeks for recheck  Use dental guard at night for TMJ  Continue warm compresses to the ear/jaw for ear pain    Start ipratropium nasal spray  Start budesonide rinses consistently 2 times per day  If no improvement at follow up we will recheck your nose and order CT sinus.       Thank you for allowing Lizzie LOPEZ and our ENT team to participate in your care.  If your medications are too expensive, please call my nurse at the number listed below.  We can possibly change this medication.    If you have a scheduling or an appointment question please contact our Health Unit Coordinator at their direct line [phone] ext [phone]  ALL nursing questions or concerns can be directed to my Nurse Jory [phone].

## 2024-03-19 NOTE — PROGRESS NOTES
Otolaryngology Note         Chief Complaint:     Patient presents with:  Follow Up: Ear cleaning            History of Present Illness:     Vesna Fabian is a 38 year old female who presents today for ear cleaning.    She has noted decreased hearing and popping in her ears over the past month or so.  She has associated nasal congestion and intermittent otalgia, feeling that she is hearing under water.      She was last seen in ENT in November 2023 for cough and congestion, tonsil stones, this note was reviewed.  She is following with rheumatology for positive PARVIZ, oral biopsy completed to rule out Sjogren's.  This was negative.  She was previously seen on 10/5/2023 by Arline, at that visit flexible endoscopy was performed that showed deviated nasal septum and turbinate edema, postnasal drainage in the nasopharynx,  The remainder of the exam was grossly unremarkable.  She was started on budesonide rinses and amitriptyline 10 mg at bedtime for chronic cough and postnasal drainage and migraine.    She continues 10 mg of amitriptyline at this time.  She has been tolerating well.      She uses flonase daily and budesonide rinses inconsistently.     No fevers or facial pain     Occasional tinnitus, feels echoey  Pain in the left ear that comes and goes, sharp and achy  + TMJ with click/pop with opening and closing jaw  No mahesh bruxism, however she does report she uses warm compress to the left ear for ear pain with improvement.  She feels like she is choking from PND at night    She gets some issues with vertigo, feels like she is spinning, seems to be worse with migraines.  She gets sweaty, pale, and feels like she is going to pass out.  She gets some ringing when she sits down for a while    She does have migraines   No facial numbness or weakness.      No  hx of COM or otologic surgeries.   No audiogram on file.          Medications:     Current Outpatient Rx   Medication Sig Dispense Refill    amitriptyline (ELAVIL)  10 MG tablet Take 2 tablets (20 mg) by mouth at bedtime 60 tablet 3    amitriptyline (ELAVIL) 10 MG tablet Take 1 tablet (10 mg) by mouth at bedtime 30 tablet 1    amitriptyline (ELAVIL) 25 MG tablet Take 25 mg at bedtime x 1 week, if tolerating well, increase to 50 mg at bedtime. 60 tablet 0    benzonatate (TESSALON) 200 MG capsule Take 1 capsule (200 mg) by mouth 3 times daily as needed for cough 30 capsule 0    budesonide (PULMICORT) 0.5 MG/2ML neb solution Make 240 cc Kieran med sinus irrigation Mix 2 ml vial of budesonide 0.5 mg Rinse 1-2 times daily 200 mL 3    fluticasone (FLONASE) 50 MCG/ACT nasal spray Spray 2 sprays into both nostrils daily 16 g 11    furosemide (LASIX) 20 MG tablet Take 1 tablet (20 mg) by mouth daily 30 tablet 3    ipratropium (ATROVENT) 0.03 % nasal spray Spray 2 sprays into both nostrils 2 times daily 30 mL 11    lisdexamfetamine (VYVANSE) 60 MG capsule Take 1 capsule (60 mg) by mouth daily 30 capsule 0    VYVANSE 30 MG capsule               Allergies:     Allergies: Patient has no known allergies.          Past Medical History:     Past Medical History:   Diagnosis Date    Attention deficit disorder without mention of hyperactivity 5/31/2000    Bipolar affective disorder 8/9/2012    Bulimia 8/9/2012    Self-injurious behavior 8/9/2012    Suicide attempt 8/9/2012            Past Surgical History:     Past Surgical History:   Procedure Laterality Date    cyst ovary      LT    CYSTOSCOPY N/A 04/06/2022    Procedure: Cystoscopy;  Surgeon: Juan Alberto Cain MD;  Location: [facility]    HYSTERECTOMY, PAP NO LONGER INDICATED N/A 04/06/2022    Cervix removed.    LAPAROSCOPIC ASSISTED HYSTERECTOMY VAGINAL N/A 04/06/2022    Procedure: LAPAROSCOPIC ASSISTED VAGINAL HYSTERECTOMY;  Surgeon: Juan Alberto Cain MD;  Location: [facility]    MAMMOPLASTY REDUCTION  01/01/2011    REPAIR TENDON PATELLA Left 07/10/2020    Procedure: Left knee patellorfemoral ligament repair;  Surgeon: Melchor Perkins DO;  Location: [facility] OR  "   SALPINGECTOMY Bilateral 2022    Procedure: BILATERAL SALPINGECTOMY;  Surgeon: Juan Alberto Cain MD;  Location: [facility] OR    wisdom teeth removed         ENT family history reviewed         Social History:     Social History     Tobacco Use    Smoking status: Former     Years: 10     Types: Cigarettes     Quit date: 2020     Years since quittin.1    Smokeless tobacco: Never    Tobacco comments:     quit . longest tobacco free 1 year. no passive exposure   Vaping Use    Vaping Use: Never used   Substance Use Topics    Alcohol use: Yes     Comment: rarely    Drug use: No            Review of Systems:     ROS: See HPI         Physical Exam:     BP (!) 140/86 (BP Location: Right arm, Patient Position: Sitting, Cuff Size: Adult Large)   Pulse 69   Temp 98  F (36.7  C) (Temporal)   Resp 18   Ht 1.651 m (5' 5\")   Wt 88.5 kg (195 lb 1.7 oz)   LMP 2022   SpO2 97%   BMI 32.47 kg/m      General - The patient is well nourished and well developed, and appears to have good nutritional status.  Alert and oriented to person and place, answers questions and cooperates with examination appropriately.   Head and Face - Normocephalic and atraumatic, with no gross asymmetry noted.  The facial nerve is intact, with strong symmetric movements.  Voice and Breathing - The patient was breathing comfortably without the use of accessory muscles. There was no wheezing, stridor. The patients voice was clear and strong, and had appropriate pitch and quality.  Ears - The ears were examined with binocular microscopy and with otoscope.  External ears normal. Right canal patent.  Left canal patent.  Right tympanic membrane is intact without effusion, retraction or mass.  Left tympanic membrane is intact without effusion, retraction or mass.  Good movement with Valsalva bilaterally  Eyes - Extraocular movements intact, sclera were not icteric or injected, conjunctiva were pink and moist.  Mouth - Examination of the oral " cavity showed pink, healthy oral mucosa. Dentition in good condition. No lesions or ulcerations noted. The tongue was mobile and midline.   TMJ-left-sided hypermobility, crack/pop with opening and closing the jaw.  Tenderness to the left side with palpation.  Throat - The walls of the oropharynx were smooth, pink, moist, symmetric, and had no lesions or ulcerations.  The tonsillar pillars and soft palate were symmetric. The uvula was midline on elevation.    Neck - Normal range of motion, no worrisome palpable lymphadenopathy.  Palpation of the thyroid was soft and smooth, with no nodules or goiter appreciated.  The trachea was mobile and midline.  Nose - External contour is symmetric, no gross deflection or scars.  Nasal mucosa is pink and moist with no abnormal mucus.  The septum and turbinates were evaluated with nasal speculum, no polyps, masses, or purulence noted on examination.         Assessment and Plan:       ICD-10-CM    1. Sensation of plugged ear on both sides  H93.8X3 Adult Audiology  Referral    left worse than right      2. Tinnitus, bilateral  H93.13 Adult Audiology  Referral      3. Post-nasal drainage  R09.82 amitriptyline (ELAVIL) 25 MG tablet     ipratropium (ATROVENT) 0.03 % nasal spray      4. Chronic cough  R05.3 amitriptyline (ELAVIL) 25 MG tablet     ipratropium (ATROVENT) 0.03 % nasal spray      5. Migraine without status migrainosus, not intractable, unspecified migraine type  G43.909 amitriptyline (ELAVIL) 25 MG tablet        Increase amitriptyline to 25 mg at bedtime x 1 week, then 50 mg at bedtime if tolerating well, refills sent.   Complete audiogram   Follow up in 3 weeks for recheck  Use dental guard at night for TMJ  Continue warm compresses to the ear/jaw for ear pain    Start ipratropium nasal spray  Start budesonide rinses consistently 2 times per day  If no improvement at follow up we will recheck your nose and order CT sinus.       Lizzie Foy  NP-C  [facility] ENT

## 2024-03-19 NOTE — TELEPHONE ENCOUNTER
LM to call back to schedule Audiology appt. Beverley said that 04/12 10:45 arrival time. JASON 04/17 at 8 am.

## 2024-03-19 NOTE — LETTER
3/19/2024         RE: Vesna Fabian  [address] [place] [address]        Dear Colleague,    Thank you for referring your patient, Vesna Fabian, to the [facility]. Please see a copy of my visit note below.    Otolaryngology Note         Chief Complaint:     Patient presents with:  Follow Up: Ear cleaning            History of Present Illness:     Vesna Fabian is a 38 year old female who presents today for ear cleaning.    She has noted decreased hearing and popping in her ears over the past month or so.  She has associated nasal congestion and intermittent otalgia, feeling that she is hearing under water.      She was last seen in ENT in November 2023 for cough and congestion, tonsil stones, this note was reviewed.  She is following with rheumatology for positive PARVIZ, oral biopsy completed to rule out Sjogren's.  This was negative.  She was previously seen on 10/5/2023 by Arline, at that visit flexible endoscopy was performed that showed deviated nasal septum and turbinate edema, postnasal drainage in the nasopharynx,  The remainder of the exam was grossly unremarkable.  She was started on budesonide rinses and amitriptyline 10 mg at bedtime for chronic cough and postnasal drainage and migraine.    She continues 10 mg of amitriptyline at this time.  She has been tolerating well.      She uses flonase daily and budesonide rinses inconsistently.     No fevers or facial pain     Occasional tinnitus, feels echoey  Pain in the left ear that comes and goes, sharp and achy  + TMJ with click/pop with opening and closing jaw  No mahesh bruxism, however she does report she uses warm compress to the left ear for ear pain with improvement.  She feels like she is choking from PND at night    She gets some issues with vertigo, feels like she is spinning, seems to be worse with migraines.  She gets sweaty, pale, and feels like she is going to pass out.  She gets some ringing when she sits down for a  while    She does have migraines   No facial numbness or weakness.      No  hx of COM or otologic surgeries.   No audiogram on file.          Medications:     Current Outpatient Rx   Medication Sig Dispense Refill     amitriptyline (ELAVIL) 10 MG tablet Take 2 tablets (20 mg) by mouth at bedtime 60 tablet 3     amitriptyline (ELAVIL) 10 MG tablet Take 1 tablet (10 mg) by mouth at bedtime 30 tablet 1     amitriptyline (ELAVIL) 25 MG tablet Take 25 mg at bedtime x 1 week, if tolerating well, increase to 50 mg at bedtime. 60 tablet 0     benzonatate (TESSALON) 200 MG capsule Take 1 capsule (200 mg) by mouth 3 times daily as needed for cough 30 capsule 0     budesonide (PULMICORT) 0.5 MG/2ML neb solution Make 240 cc Kieran med sinus irrigation Mix 2 ml vial of budesonide 0.5 mg Rinse 1-2 times daily 200 mL 3     fluticasone (FLONASE) 50 MCG/ACT nasal spray Spray 2 sprays into both nostrils daily 16 g 11     furosemide (LASIX) 20 MG tablet Take 1 tablet (20 mg) by mouth daily 30 tablet 3     ipratropium (ATROVENT) 0.03 % nasal spray Spray 2 sprays into both nostrils 2 times daily 30 mL 11     lisdexamfetamine (VYVANSE) 60 MG capsule Take 1 capsule (60 mg) by mouth daily 30 capsule 0     VYVANSE 30 MG capsule               Allergies:     Allergies: Patient has no known allergies.          Past Medical History:     Past Medical History:   Diagnosis Date     Attention deficit disorder without mention of hyperactivity 5/31/2000     Bipolar affective disorder 8/9/2012     Bulimia 8/9/2012     Self-injurious behavior 8/9/2012     Suicide attempt 8/9/2012            Past Surgical History:     Past Surgical History:   Procedure Laterality Date     cyst ovary      LT     CYSTOSCOPY N/A 04/06/2022    Procedure: Cystoscopy;  Surgeon: Juan Alberto Cain MD;  Location: [facility]     HYSTERECTOMY, PAP NO LONGER INDICATED N/A 04/06/2022    Cervix removed.     LAPAROSCOPIC ASSISTED HYSTERECTOMY VAGINAL N/A 04/06/2022    Procedure: LAPAROSCOPIC  "ASSISTED VAGINAL HYSTERECTOMY;  Surgeon: Juan Alberto Cain MD;  Location: [facility]     MAMMOPLASTY REDUCTION  2011     REPAIR TENDON PATELLA Left 07/10/2020    Procedure: Left knee patellorfemoral ligament repair;  Surgeon: Melchor Perkins DO;  Location: [facility]     SALPINGECTOMY Bilateral 2022    Procedure: BILATERAL SALPINGECTOMY;  Surgeon: Juan Alberto Cain MD;  Location: [facility]     wisdom teeth removed         ENT family history reviewed         Social History:     Social History     Tobacco Use     Smoking status: Former     Years: 10     Types: Cigarettes     Quit date: 2020     Years since quittin.1     Smokeless tobacco: Never     Tobacco comments:     quit . longest tobacco free 1 year. no passive exposure   Vaping Use     Vaping Use: Never used   Substance Use Topics     Alcohol use: Yes     Comment: rarely     Drug use: No            Review of Systems:     ROS: See HPI         Physical Exam:     BP (!) 140/86 (BP Location: Right arm, Patient Position: Sitting, Cuff Size: Adult Large)   Pulse 69   Temp 98  F (36.7  C) (Temporal)   Resp 18   Ht 1.651 m (5' 5\")   Wt 88.5 kg (195 lb 1.7 oz)   LMP 2022   SpO2 97%   BMI 32.47 kg/m      General - The patient is well nourished and well developed, and appears to have good nutritional status.  Alert and oriented to person and place, answers questions and cooperates with examination appropriately.   Head and Face - Normocephalic and atraumatic, with no gross asymmetry noted.  The facial nerve is intact, with strong symmetric movements.  Voice and Breathing - The patient was breathing comfortably without the use of accessory muscles. There was no wheezing, stridor. The patients voice was clear and strong, and had appropriate pitch and quality.  Ears - The ears were examined with binocular microscopy and with otoscope.  External ears normal. Right canal patent.  Left canal patent.  Right tympanic membrane is intact without effusion, " retraction or mass.  Left tympanic membrane is intact without effusion, retraction or mass.  Good movement with Valsalva bilaterally  Eyes - Extraocular movements intact, sclera were not icteric or injected, conjunctiva were pink and moist.  Mouth - Examination of the oral cavity showed pink, healthy oral mucosa. Dentition in good condition. No lesions or ulcerations noted. The tongue was mobile and midline.   TMJ-left-sided hypermobility, crack/pop with opening and closing the jaw.  Tenderness to the left side with palpation.  Throat - The walls of the oropharynx were smooth, pink, moist, symmetric, and had no lesions or ulcerations.  The tonsillar pillars and soft palate were symmetric. The uvula was midline on elevation.    Neck - Normal range of motion, no worrisome palpable lymphadenopathy.  Palpation of the thyroid was soft and smooth, with no nodules or goiter appreciated.  The trachea was mobile and midline.  Nose - External contour is symmetric, no gross deflection or scars.  Nasal mucosa is pink and moist with no abnormal mucus.  The septum and turbinates were evaluated with nasal speculum, no polyps, masses, or purulence noted on examination.         Assessment and Plan:       ICD-10-CM    1. Sensation of plugged ear on both sides  H93.8X3 Adult Audiology  Referral    left worse than right      2. Tinnitus, bilateral  H93.13 Adult Audiology  Referral      3. Post-nasal drainage  R09.82 amitriptyline (ELAVIL) 25 MG tablet     ipratropium (ATROVENT) 0.03 % nasal spray      4. Chronic cough  R05.3 amitriptyline (ELAVIL) 25 MG tablet     ipratropium (ATROVENT) 0.03 % nasal spray      5. Migraine without status migrainosus, not intractable, unspecified migraine type  G43.909 amitriptyline (ELAVIL) 25 MG tablet        Increase amitriptyline to 25 mg at bedtime x 1 week, then 50 mg at bedtime if tolerating well, refills sent.   Complete audiogram   Follow up in 3 weeks for recheck  Use dental  guard at night for TMJ  Continue warm compresses to the ear/jaw for ear pain    Start ipratropium nasal spray  Start budesonide rinses consistently 2 times per day  If no improvement at follow up we will recheck your nose and order CT sinus.       Lizzie LOPEZ  [facility] ENT      Again, thank you for allowing me to participate in the care of your patient.        Sincerely,        Lizzie Foy NP

## 2024-04-03 ENCOUNTER — MYC REFILL (OUTPATIENT)
Dept: FAMILY MEDICINE | Facility: OTHER | Age: 39
End: 2024-04-03

## 2024-04-03 DIAGNOSIS — F90.2 ADHD (ATTENTION DEFICIT HYPERACTIVITY DISORDER), COMBINED TYPE: ICD-10-CM

## 2024-04-03 RX ORDER — LISDEXAMFETAMINE DIMESYLATE 60 MG/1
60 CAPSULE ORAL DAILY
Qty: 30 CAPSULE | Refills: 0 | Status: SHIPPED | OUTPATIENT
Start: 2024-04-03 | End: 2024-04-26

## 2024-04-03 NOTE — TELEPHONE ENCOUNTER
lisdexamfetamine (VYVANSE) 60 MG capsule       Last Written Prescription Date:  03/07/2024  Last Fill Quantity: 30,   # refills: 0  Last Office Visit: 9/18/2023  Future Office visit:       Routing refill request to provider for review/approval because:    Kimberly Boecker, RN

## 2024-04-26 ENCOUNTER — MYC REFILL (OUTPATIENT)
Dept: FAMILY MEDICINE | Facility: OTHER | Age: 39
End: 2024-04-26

## 2024-04-26 DIAGNOSIS — F90.2 ADHD (ATTENTION DEFICIT HYPERACTIVITY DISORDER), COMBINED TYPE: ICD-10-CM

## 2024-04-26 NOTE — TELEPHONE ENCOUNTER
lisdexamfetamine (VYVANSE) 60 MG capsule       Last Written Prescription Date:  4/3/2024  Last Fill Quantity: 30,   # refills: 0  Last Office Visit: 9/18/2023  Future Office visit:       Routing refill request to provider for review/approval because:    Kimberly Boecker, RN

## 2024-04-28 RX ORDER — LISDEXAMFETAMINE DIMESYLATE 60 MG/1
60 CAPSULE ORAL DAILY
Qty: 30 CAPSULE | Refills: 0 | Status: SHIPPED | OUTPATIENT
Start: 2024-04-28 | End: 2024-07-17

## 2024-04-30 ENCOUNTER — OFFICE VISIT (OUTPATIENT)
Dept: AUDIOLOGY | Facility: OTHER | Age: 39
End: 2024-04-30
Attending: NURSE PRACTITIONER
Payer: COMMERCIAL

## 2024-04-30 DIAGNOSIS — H93.8X2 SENSATION OF PLUGGED EAR ON LEFT SIDE: Primary | ICD-10-CM

## 2024-04-30 DIAGNOSIS — H93.8X3 SENSATION OF PLUGGED EAR ON BOTH SIDES: ICD-10-CM

## 2024-04-30 DIAGNOSIS — H93.13 TINNITUS, BILATERAL: ICD-10-CM

## 2024-04-30 PROCEDURE — 92550 TYMPANOMETRY & REFLEX THRESH: CPT | Performed by: AUDIOLOGIST

## 2024-04-30 PROCEDURE — 92557 COMPREHENSIVE HEARING TEST: CPT | Performed by: AUDIOLOGIST

## 2024-04-30 NOTE — PROGRESS NOTES
Audiology Evaluation Completed. Please refer SCANNED AUDIOGRAM and/or TYMPANOGRAM for BACKGROUND, RESULTS, RECOMMENDATIONS.      Sofia ELLIS, [facility]  Audiologist #[id number]

## 2024-05-07 ENCOUNTER — TELEPHONE (OUTPATIENT)
Dept: OTOLARYNGOLOGY | Facility: OTHER | Age: 39
End: 2024-05-07

## 2024-05-07 NOTE — TELEPHONE ENCOUNTER
Normal audiogram, no evidence of ETD. Please call JS     Called patient left message to call for audiogram results.

## 2024-05-09 NOTE — TELEPHONE ENCOUNTER
Patient called back left a message to call her back.  I left a message letting her know I would send results over MY CHART but to call if she doesn't get it.

## 2024-07-07 ENCOUNTER — HEALTH MAINTENANCE LETTER (OUTPATIENT)
Age: 39
End: 2024-07-07

## 2024-07-17 ENCOUNTER — MYC REFILL (OUTPATIENT)
Dept: FAMILY MEDICINE | Facility: OTHER | Age: 39
End: 2024-07-17

## 2024-07-17 DIAGNOSIS — F90.2 ADHD (ATTENTION DEFICIT HYPERACTIVITY DISORDER), COMBINED TYPE: ICD-10-CM

## 2024-07-18 DIAGNOSIS — F90.2 ADHD (ATTENTION DEFICIT HYPERACTIVITY DISORDER), COMBINED TYPE: ICD-10-CM

## 2024-07-18 NOTE — TELEPHONE ENCOUNTER
lisdexamfetamine (VYVANSE) 60 MG capsule         Last Written Prescription Date:  04/28/2024  Last Fill Quantity: 30,   # refills: 0  Last Office Visit: 09/18/2023  Future Office visit:       Routing refill request to provider for review/approval because:    Kimberly Boecker, RN

## 2024-07-18 NOTE — TELEPHONE ENCOUNTER
Yana      Last Written Prescription Date:  4/28/24  Last Fill Quantity: 30,   # refills: 0  Last Office Visit: 9/18/23  Future Office visit:       Routing refill request to provider for review/approval because:

## 2024-07-19 RX ORDER — LISDEXAMFETAMINE DIMESYLATE 60 MG/1
60 CAPSULE ORAL DAILY
Qty: 30 CAPSULE | Refills: 0 | OUTPATIENT
Start: 2024-07-19

## 2024-07-19 RX ORDER — LISDEXAMFETAMINE DIMESYLATE 60 MG/1
60 CAPSULE ORAL DAILY
Qty: 30 CAPSULE | Refills: 0 | Status: SHIPPED | OUTPATIENT
Start: 2024-07-19 | End: 2024-08-26

## 2024-07-19 NOTE — TELEPHONE ENCOUNTER
Dixie Malagon had been without insurance since May.  She now has active insurance.  Asking for refill.  Informed her that she is due for an appointment with you - she will call to schedule.

## 2024-07-22 ENCOUNTER — ANCILLARY PROCEDURE (OUTPATIENT)
Dept: MAMMOGRAPHY | Facility: OTHER | Age: 39
End: 2024-07-22
Attending: PHYSICIAN ASSISTANT

## 2024-07-22 DIAGNOSIS — Z12.31 VISIT FOR SCREENING MAMMOGRAM: ICD-10-CM

## 2024-07-22 PROCEDURE — 77067 SCR MAMMO BI INCL CAD: CPT | Mod: TC | Performed by: RADIOLOGY

## 2024-07-22 PROCEDURE — 77063 BREAST TOMOSYNTHESIS BI: CPT | Mod: TC | Performed by: RADIOLOGY

## 2024-07-23 ENCOUNTER — TELEPHONE (OUTPATIENT)
Dept: MAMMOGRAPHY | Facility: OTHER | Age: 39
End: 2024-07-23

## 2024-08-26 ENCOUNTER — MYC REFILL (OUTPATIENT)
Dept: FAMILY MEDICINE | Facility: OTHER | Age: 39
End: 2024-08-26

## 2024-08-26 DIAGNOSIS — F90.2 ADHD (ATTENTION DEFICIT HYPERACTIVITY DISORDER), COMBINED TYPE: ICD-10-CM

## 2024-08-26 RX ORDER — LISDEXAMFETAMINE DIMESYLATE 60 MG/1
60 CAPSULE ORAL DAILY
Qty: 30 CAPSULE | Refills: 0 | Status: SHIPPED | OUTPATIENT
Start: 2024-08-26 | End: 2024-09-27

## 2024-08-26 NOTE — TELEPHONE ENCOUNTER
NIKyvaurelia       Last Written Prescription Date:  7/19/2024  Last Fill Quantity: 30,   # refills: 0  Last Office Visit: 9/18/2023  Future Office visit:

## 2024-09-03 ENCOUNTER — MYC REFILL (OUTPATIENT)
Dept: FAMILY MEDICINE | Facility: OTHER | Age: 39
End: 2024-09-03

## 2024-09-03 DIAGNOSIS — F90.2 ADHD (ATTENTION DEFICIT HYPERACTIVITY DISORDER), COMBINED TYPE: ICD-10-CM

## 2024-09-04 ENCOUNTER — MYC MEDICAL ADVICE (OUTPATIENT)
Dept: FAMILY MEDICINE | Facility: OTHER | Age: 39
End: 2024-09-04

## 2024-09-04 RX ORDER — LISDEXAMFETAMINE DIMESYLATE 60 MG/1
60 CAPSULE ORAL DAILY
Qty: 30 CAPSULE | Refills: 0 | OUTPATIENT
Start: 2024-09-04

## 2024-09-27 ENCOUNTER — MYC REFILL (OUTPATIENT)
Dept: FAMILY MEDICINE | Facility: OTHER | Age: 39
End: 2024-09-27

## 2024-09-27 DIAGNOSIS — F90.2 ADHD (ATTENTION DEFICIT HYPERACTIVITY DISORDER), COMBINED TYPE: ICD-10-CM

## 2024-09-30 RX ORDER — LISDEXAMFETAMINE DIMESYLATE 60 MG/1
60 CAPSULE ORAL DAILY
Qty: 30 CAPSULE | Refills: 0 | Status: SHIPPED | OUTPATIENT
Start: 2024-09-30

## 2024-10-22 ENCOUNTER — HOSPITAL ENCOUNTER (EMERGENCY)
Facility: HOSPITAL | Age: 39
Discharge: HOME | End: 2024-10-22
Attending: PHYSICIAN ASSISTANT | Admitting: PHYSICIAN ASSISTANT
Payer: COMMERCIAL

## 2024-10-22 ENCOUNTER — APPOINTMENT (OUTPATIENT)
Dept: CT IMAGING | Facility: HOSPITAL | Age: 39
End: 2024-10-22
Attending: PHYSICIAN ASSISTANT
Payer: COMMERCIAL

## 2024-10-22 VITALS
OXYGEN SATURATION: 100 % | DIASTOLIC BLOOD PRESSURE: 89 MMHG | TEMPERATURE: 97.3 F | HEART RATE: 62 BPM | SYSTOLIC BLOOD PRESSURE: 122 MMHG | RESPIRATION RATE: 18 BRPM

## 2024-10-22 DIAGNOSIS — R10.9 ABDOMINAL PAIN OF UNKNOWN ETIOLOGY: ICD-10-CM

## 2024-10-22 DIAGNOSIS — K62.5 RECTAL BLEEDING: Primary | ICD-10-CM

## 2024-10-22 DIAGNOSIS — Z13.220 LIPID SCREENING: ICD-10-CM

## 2024-10-22 LAB
ALBUMIN SERPL BCG-MCNC: 4.6 G/DL (ref 3.5–5.2)
ALP SERPL-CCNC: 86 U/L (ref 40–150)
ALT SERPL W P-5'-P-CCNC: 21 U/L (ref 0–50)
ANION GAP SERPL CALCULATED.3IONS-SCNC: 11 MMOL/L (ref 7–15)
AST SERPL W P-5'-P-CCNC: 19 U/L (ref 0–45)
BASOPHILS # BLD AUTO: 0 10E3/UL (ref 0–0.2)
BASOPHILS NFR BLD AUTO: 0 %
BILIRUB SERPL-MCNC: 0.3 MG/DL
BUN SERPL-MCNC: 8.5 MG/DL (ref 6–20)
CALCIUM SERPL-MCNC: 9.1 MG/DL (ref 8.8–10.4)
CHLORIDE SERPL-SCNC: 104 MMOL/L (ref 98–107)
CREAT SERPL-MCNC: 0.65 MG/DL (ref 0.51–0.95)
EGFRCR SERPLBLD CKD-EPI 2021: >90 ML/MIN/1.73M2
EOSINOPHIL # BLD AUTO: 0.1 10E3/UL (ref 0–0.7)
EOSINOPHIL NFR BLD AUTO: 1 %
ERYTHROCYTE [DISTWIDTH] IN BLOOD BY AUTOMATED COUNT: 12.6 % (ref 10–15)
GLUCOSE SERPL-MCNC: 90 MG/DL (ref 70–99)
HCO3 SERPL-SCNC: 22 MMOL/L (ref 22–29)
HCT VFR BLD AUTO: 45.6 % (ref 35–47)
HGB BLD-MCNC: 15.5 G/DL (ref 11.7–15.7)
HOLD SPECIMEN: NORMAL
IMM GRANULOCYTES # BLD: 0.1 10E3/UL
IMM GRANULOCYTES NFR BLD: 1 %
LIPASE SERPL-CCNC: 15 U/L (ref 13–60)
LYMPHOCYTES # BLD AUTO: 1.2 10E3/UL (ref 0.8–5.3)
LYMPHOCYTES NFR BLD AUTO: 12 %
MCH RBC QN AUTO: 30.2 PG (ref 26.5–33)
MCHC RBC AUTO-ENTMCNC: 34 G/DL (ref 31.5–36.5)
MCV RBC AUTO: 89 FL (ref 78–100)
MONOCYTES # BLD AUTO: 0.5 10E3/UL (ref 0–1.3)
MONOCYTES NFR BLD AUTO: 5 %
NEUTROPHILS # BLD AUTO: 7.9 10E3/UL (ref 1.6–8.3)
NEUTROPHILS NFR BLD AUTO: 81 %
NRBC # BLD AUTO: 0 10E3/UL
NRBC BLD AUTO-RTO: 0 /100
PLATELET # BLD AUTO: 371 10E3/UL (ref 150–450)
POTASSIUM SERPL-SCNC: 4.5 MMOL/L (ref 3.4–5.3)
PROT SERPL-MCNC: 7.3 G/DL (ref 6.4–8.3)
RBC # BLD AUTO: 5.14 10E6/UL (ref 3.8–5.2)
SODIUM SERPL-SCNC: 137 MMOL/L (ref 135–145)
WBC # BLD AUTO: 9.7 10E3/UL (ref 4–11)

## 2024-10-22 PROCEDURE — 250N000011 HC RX IP 250 OP 636: Performed by: RADIOLOGY

## 2024-10-22 PROCEDURE — 99284 EMERGENCY DEPT VISIT MOD MDM: CPT | Performed by: PHYSICIAN ASSISTANT

## 2024-10-22 PROCEDURE — 80053 COMPREHEN METABOLIC PANEL: CPT | Performed by: PHYSICIAN ASSISTANT

## 2024-10-22 PROCEDURE — 250N000011 HC RX IP 250 OP 636: Performed by: PHYSICIAN ASSISTANT

## 2024-10-22 PROCEDURE — 85025 COMPLETE CBC W/AUTO DIFF WBC: CPT | Performed by: PHYSICIAN ASSISTANT

## 2024-10-22 PROCEDURE — 83690 ASSAY OF LIPASE: CPT | Performed by: PHYSICIAN ASSISTANT

## 2024-10-22 PROCEDURE — 80061 LIPID PANEL: CPT

## 2024-10-22 PROCEDURE — 36415 COLL VENOUS BLD VENIPUNCTURE: CPT | Performed by: PHYSICIAN ASSISTANT

## 2024-10-22 PROCEDURE — 99285 EMERGENCY DEPT VISIT HI MDM: CPT | Mod: 25 | Performed by: PHYSICIAN ASSISTANT

## 2024-10-22 PROCEDURE — 74177 CT ABD & PELVIS W/CONTRAST: CPT

## 2024-10-22 PROCEDURE — 96374 THER/PROPH/DIAG INJ IV PUSH: CPT | Mod: XU | Performed by: PHYSICIAN ASSISTANT

## 2024-10-22 RX ORDER — IOPAMIDOL 755 MG/ML
95 INJECTION, SOLUTION INTRAVASCULAR ONCE
Status: COMPLETED | OUTPATIENT
Start: 2024-10-22 | End: 2024-10-22

## 2024-10-22 RX ORDER — DICYCLOMINE HCL 20 MG
20 TABLET ORAL 2 TIMES DAILY
Qty: 20 TABLET | Refills: 0 | Status: SHIPPED | OUTPATIENT
Start: 2024-10-22 | End: 2024-10-31

## 2024-10-22 RX ORDER — ONDANSETRON 2 MG/ML
4 INJECTION INTRAMUSCULAR; INTRAVENOUS EVERY 30 MIN PRN
Status: DISCONTINUED | OUTPATIENT
Start: 2024-10-22 | End: 2024-10-22 | Stop reason: HOSPADM

## 2024-10-22 RX ADMIN — ONDANSETRON 4 MG: 2 INJECTION INTRAMUSCULAR; INTRAVENOUS at 11:56

## 2024-10-22 RX ADMIN — IOPAMIDOL 95 ML: 755 INJECTION, SOLUTION INTRAVENOUS at 12:42

## 2024-10-22 ASSESSMENT — ENCOUNTER SYMPTOMS
BRUISES/BLEEDS EASILY: 0
FEVER: 0
ROS GI COMMENTS: SEE HPI

## 2024-10-22 ASSESSMENT — ACTIVITIES OF DAILY LIVING (ADL)
ADLS_ACUITY_SCORE: 36
ADLS_ACUITY_SCORE: 36

## 2024-10-22 ASSESSMENT — COLUMBIA-SUICIDE SEVERITY RATING SCALE - C-SSRS
2. HAVE YOU ACTUALLY HAD ANY THOUGHTS OF KILLING YOURSELF IN THE PAST MONTH?: NO
6. HAVE YOU EVER DONE ANYTHING, STARTED TO DO ANYTHING, OR PREPARED TO DO ANYTHING TO END YOUR LIFE?: NO
1. IN THE PAST MONTH, HAVE YOU WISHED YOU WERE DEAD OR WISHED YOU COULD GO TO SLEEP AND NOT WAKE UP?: NO

## 2024-10-22 NOTE — ED TRIAGE NOTES
"Pt states she had 3 episodes of bloody stool since 10 am today. Pt is very anxious. Pt states she has 7/10 abdominal pain. Pt states the blood was bright red and \"a lot\".         "

## 2024-10-22 NOTE — ED PROVIDER NOTES
History     Chief Complaint   Patient presents with    Abdominal Pain     The history is provided by the patient.     Vesna Fabian is a 39 year old female who presented to the emergency department ambulatory for evaluation of a few days of generalized abdominal pain mostly in the epigastric area as well as new onset bright red blood per rectum beginning today.  She has had 3 episodes.  The rectal bleeding is improving.  She is on no blood thinners.  No recent travel.  No recent antibiotics.  No recent hospitalizations.  She is undergone hysterectomy in the past.  She had a colonoscopy in her 30s.  Family history of colon cancer.    Allergies:  No Known Allergies    Problem List:    Patient Active Problem List    Diagnosis Date Noted    S/P hysterectomy 04/06/2022     Priority: Medium     LAVH      Decreased ferritin 01/08/2021     Priority: Medium    Ligament tear 06/24/2020     Priority: Medium     Added automatically from request for surgery [id number]      ACP (advance care planning) 03/10/2016     Priority: Medium     Advance Care Planning 3/10/2016: ACP Review of Chart / Resources Provided:  Reviewed chart for advance care plan.  Vesna Fabian has been provided information and resources to begin or update their advance care plan.  Added by Kenisha Chilel            NO SHOW 06/18/2015     Priority: Medium     No shows for Dr. Valdez : 5/15/13; 4/29/15        ASCUS (atypical squamous cells of undetermined significance) on Pap smear 04/29/2014     Priority: Medium    Encounter for routine gynecological examination 04/21/2014     Priority: Medium     Problem list name updated by automated process. Provider to review      Decreased libido 04/21/2014     Priority: Medium     Sensate focus      Fatigue 04/21/2014     Priority: Medium    Smoker 04/21/2014     Priority: Medium     Ready to quit. Only 3 cigs in a week      Family planning 04/22/2013     Priority: Medium    Moderate major depression (H) 04/22/2013      Priority: Medium     Denies suicidal or homicidal ideations at this time. Working with RÚAL Molina      Anxiety 2013     Priority: Medium    Chronic fatigue 2013     Priority: Medium    Bipolar disorder, unspecified (H) 2012     Priority: Medium        Past Medical History:    Past Medical History:   Diagnosis Date    Attention deficit disorder without mention of hyperactivity 2000    Bipolar affective disorder 2012    Bulimia 2012    Self-injurious behavior 2012    Suicide attempt 2012       Past Surgical History:    Past Surgical History:   Procedure Laterality Date    cyst ovary      LT    CYSTOSCOPY N/A 2022    Procedure: Cystoscopy;  Surgeon: Juan Alberto Cain MD;  Location: [facility]    HYSTERECTOMY, PAP NO LONGER INDICATED N/A 2022    Cervix removed.    LAPAROSCOPIC ASSISTED HYSTERECTOMY VAGINAL N/A 2022    Procedure: LAPAROSCOPIC ASSISTED VAGINAL HYSTERECTOMY;  Surgeon: Juan Alberto Cain MD;  Location: [facility]    MAMMOPLASTY REDUCTION  2011    REPAIR TENDON PATELLA Left 07/10/2020    Procedure: Left knee patellorfemoral ligament repair;  Surgeon: Melchor Perkins DO;  Location: [facility]    SALPINGECTOMY Bilateral 2022    Procedure: BILATERAL SALPINGECTOMY;  Surgeon: Juan Alberto Cain MD;  Location: [facility]    wisdom teeth removed         Family History:    Family History   Problem Relation Age of Onset    Cancer Maternal Grandmother         pancreatic cancer; cause of death    Cancer Father         rectal    Colon Cancer Father     Heart Disease Paternal Grandmother         heart disease    Hypertension Mother        Social History:  Marital Status:  Single [1]  Social History     Tobacco Use    Smoking status: Former     Current packs/day: 0.00     Types: Cigarettes     Start date: 2010     Quit date: 2020     Years since quittin.7    Smokeless tobacco: Never    Tobacco comments:     quit . longest tobacco free 1 year. no passive exposure    Vaping Use    Vaping status: Never Used   Substance Use Topics    Alcohol use: Yes     Comment: rarely    Drug use: No        Medications:    dicyclomine (BENTYL) 20 MG tablet  amitriptyline (ELAVIL) 10 MG tablet  amitriptyline (ELAVIL) 10 MG tablet  amitriptyline (ELAVIL) 25 MG tablet  benzonatate (TESSALON) 200 MG capsule  budesonide (PULMICORT) 0.5 MG/2ML neb solution  fluticasone (FLONASE) 50 MCG/ACT nasal spray  furosemide (LASIX) 20 MG tablet  ipratropium (ATROVENT) 0.03 % nasal spray  lisdexamfetamine (VYVANSE) 60 MG capsule  VYVANSE 30 MG capsule          Review of Systems   Constitutional:  Negative for fever.   Gastrointestinal:         See HPI   Hematological:  Does not bruise/bleed easily.       Physical Exam   BP: 130/88  Pulse: 71  Temp: 97.3  F (36.3  C)  Resp: 20  SpO2: 100 %      Physical Exam  Vitals and nursing note reviewed.   Constitutional:       General: She is not in acute distress.     Appearance: Normal appearance. She is not ill-appearing, toxic-appearing or diaphoretic.   Cardiovascular:      Rate and Rhythm: Normal rate and regular rhythm.   Pulmonary:      Effort: Pulmonary effort is normal.   Abdominal:      General: There is no distension.      Palpations: Abdomen is soft.      Tenderness: There is no abdominal tenderness. There is no guarding.   Skin:     General: Skin is warm and dry.      Capillary Refill: Capillary refill takes less than 2 seconds.   Neurological:      General: No focal deficit present.      Mental Status: She is alert and oriented to person, place, and time.   Psychiatric:         Mood and Affect: Mood normal.         Behavior: Behavior normal.         ED Course     ED Course as of 10/22/24 1324   Tue Oct 22, 2024   1228 Hemoglobin: 15.5     Procedures              Critical Care time:  none              Results for orders placed or performed during the hospital encounter of 10/22/24 (from the past 24 hour(s))   CBC with platelets differential    Narrative     The following orders were created for panel order CBC with platelets differential.  Procedure                               Abnormality         Status                     ---------                               -----------         ------                     CBC with platelets and d...[[id number]]                      Final result                 Please view results for these tests on the individual orders.   Comprehensive metabolic panel   Result Value Ref Range    Sodium 137 135 - 145 mmol/L    Potassium 4.5 3.4 - 5.3 mmol/L    Carbon Dioxide (CO2) 22 22 - 29 mmol/L    Anion Gap 11 7 - 15 mmol/L    Urea Nitrogen 8.5 6.0 - 20.0 mg/dL    Creatinine 0.65 0.51 - 0.95 mg/dL    GFR Estimate >90 >60 mL/min/1.73m2    Calcium 9.1 8.8 - 10.4 mg/dL    Chloride 104 98 - 107 mmol/L    Glucose 90 70 - 99 mg/dL    Alkaline Phosphatase 86 40 - 150 U/L    AST 19 0 - 45 U/L    ALT 21 0 - 50 U/L    Protein Total 7.3 6.4 - 8.3 g/dL    Albumin 4.6 3.5 - 5.2 g/dL    Bilirubin Total 0.3 <=1.2 mg/dL   Lipase   Result Value Ref Range    Lipase 15 13 - 60 U/L   CBC with platelets and differential   Result Value Ref Range    WBC Count 9.7 4.0 - 11.0 10e3/uL    RBC Count 5.14 3.80 - 5.20 10e6/uL    Hemoglobin 15.5 11.7 - 15.7 g/dL    Hematocrit 45.6 35.0 - 47.0 %    MCV 89 78 - 100 fL    MCH 30.2 26.5 - 33.0 pg    MCHC 34.0 31.5 - 36.5 g/dL    RDW 12.6 10.0 - 15.0 %    Platelet Count 371 150 - 450 10e3/uL    % Neutrophils 81 %    % Lymphocytes 12 %    % Monocytes 5 %    % Eosinophils 1 %    % Basophils 0 %    % Immature Granulocytes 1 %    NRBCs per 100 WBC 0 <1 /100    Absolute Neutrophils 7.9 1.6 - 8.3 10e3/uL    Absolute Lymphocytes 1.2 0.8 - 5.3 10e3/uL    Absolute Monocytes 0.5 0.0 - 1.3 10e3/uL    Absolute Eosinophils 0.1 0.0 - 0.7 10e3/uL    Absolute Basophils 0.0 0.0 - 0.2 10e3/uL    Absolute Immature Granulocytes 0.1 <=0.4 10e3/uL    Absolute NRBCs 0.0 10e3/uL   Extra Tube    Narrative    The following orders were created for panel  order Extra Tube.  Procedure                               Abnormality         Status                     ---------                               -----------         ------                     Extra Blue Top Tube[[id number]]                              Final result                 Please view results for these tests on the individual orders.   Extra Blue Top Tube   Result Value Ref Range    Hold Specimen [facility]    CT Abdomen Pelvis w Contrast    Narrative    Exam:CT ABDOMEN PELVIS W CONTRAST    History: 39 years Female Generalized abdominal pain and hematochezia    Comparisons: None    Technique: Axial CT imaging of the abdomen and pelvis was performed  with contrast. Coronal and sagittal reconstructions were obtained.   This exam was performed using one or more of the following dose  reduction techniques:  Automated exposure control, adjustment of the ROSY and/or KV according  to patient's size, and/or use of iterative reconstruction technique.       FINDINGS:  Lung bases:The lung bases are clear    Abdomen:  Liver:Unremarkable  Gallbladder and biliary tree:No calcified gallstones are present.  There is no evidence of biliary dilatation.  Pancreas:Unremarkable  Spleen:Unremarkable  Adrenals:Normal    Kidneys and ureters:Unremarkable    Lymph nodes:There is no significant lymphadenopathy    Bowel:No abnormally distended or thickened loops of bowel are present.  There is no evidence of bowel obstruction.    Appendix:Unremarkable    Vessels:Unremarkable    Osseous structures:Unremarkable    Pelvis:There is no evidence of mass or lymphadenopathy. No abnormal  fluid collections are present.            Impression    IMPRESSION: No acute intra-abdominal findings. No evidence of bowel  obstruction or inflammatory process.    MICHELLE HEADLEY MD         SYSTEM ID:  [id number]       Medications   ondansetron (ZOFRAN) injection 4 mg (4 mg Intravenous $Given 10/22/24 1158)   sodium chloride (PF) 0.9% PF flush 50 mL (50 mLs  Intravenous $Given 10/22/24 1245)   iopamidol (ISOVUE-370) solution 95 mL (95 mLs Intravenous $Given 10/22/24 1242)       Assessments & Plan (with Medical Decision Making)   This is a 39-year-old female who presented to the emergency department for evaluation of abdominal cramping and diarrhea as well as new onset rectal bleeding.  Workup in the emergency department included serum testing as well as cross-sectional imaging of the abdomen pelvis with IV contrast.  No concerning features or findings.  Long discussion.  Broad differential was considered to include mesenteric ischemia, colitis, infectious diarrhea, bleeding internal hemorrhoid, diverticular bleed.  Her vital signs are entirely normal.  She has no fevers.  She has no leukocytosis or anemia.  I believe the remainder of the workup can be accomplished as an outpatient.  General surgery referral was placed.  Strict return precautions were provided.  Clinic follow-up discussed.  Vesna had no further questions or concerns for me.    This document was prepared using a combination of typing and voice generated software.  While every attempt was made for accuracy, spelling and grammatical errors may exist.     I have reviewed the nursing notes.    I have reviewed the findings, diagnosis, plan and need for follow up with the patient.           Medical Decision Making  The patient's presentation was of moderate complexity (an undiagnosed new problem with uncertain prognosis).    The patient's evaluation involved:  ordering and/or review of 3+ test(s) in this encounter (labs and CT scan)    The patient's management necessitated moderate risk (prescription drug management including medications given in the ED).        Discharge Medication List as of 10/22/2024  1:11 PM          Final diagnoses:   Abdominal pain of unknown etiology   Rectal bleeding       10/22/2024   [facility]       Ivania Allen PA-C  10/22/24 [id number]

## 2024-10-22 NOTE — ED NOTES
"Pt reports intermittent sharp epigastric pain for the past couple of days, she's also had diarrhea. Today pt report's \"maple syrupy bloody stool, and now chunky.\" Pt reports family hx of colon cancer, pt last colonoscopy at age 28. Denies hx of ulcers and excessive alcohol use. Pt states intermittent dizziness with pre syncopal episodes. Pt doesn't think she's ever passed out.  "

## 2024-10-22 NOTE — DISCHARGE INSTRUCTIONS
I have placed a general surgery referral for likely colonoscopy in the future.    Please follow-up in the clinic this week for recheck.    Return to this emergency department for any new or worsening symptoms or other questions or concerns.

## 2024-10-22 NOTE — ED NOTES
Patient discharged from ED. AVS reviewed and discussed with patient. No additional questions. Ambulatory. VSS and charted.

## 2024-10-23 ENCOUNTER — OFFICE VISIT (OUTPATIENT)
Dept: FAMILY MEDICINE | Facility: OTHER | Age: 39
End: 2024-10-23
Attending: PHYSICIAN ASSISTANT
Payer: COMMERCIAL

## 2024-10-23 VITALS
HEART RATE: 73 BPM | DIASTOLIC BLOOD PRESSURE: 84 MMHG | HEIGHT: 65 IN | SYSTOLIC BLOOD PRESSURE: 122 MMHG | RESPIRATION RATE: 18 BRPM | OXYGEN SATURATION: 97 % | TEMPERATURE: 97.7 F | WEIGHT: 193.2 LBS | BODY MASS INDEX: 32.19 KG/M2

## 2024-10-23 DIAGNOSIS — Z00.00 ROUTINE GENERAL MEDICAL EXAMINATION AT A HEALTH CARE FACILITY: Primary | ICD-10-CM

## 2024-10-23 DIAGNOSIS — F90.2 ADHD (ATTENTION DEFICIT HYPERACTIVITY DISORDER), COMBINED TYPE: ICD-10-CM

## 2024-10-23 DIAGNOSIS — Z13.220 LIPID SCREENING: ICD-10-CM

## 2024-10-23 DIAGNOSIS — R60.1 GENERALIZED EDEMA: ICD-10-CM

## 2024-10-23 DIAGNOSIS — Z09 FOLLOW-UP EXAM: ICD-10-CM

## 2024-10-23 DIAGNOSIS — K92.2 LOWER GI BLEEDING: ICD-10-CM

## 2024-10-23 LAB
CHOLEST SERPL-MCNC: 183 MG/DL
HDLC SERPL-MCNC: 50 MG/DL
LDLC SERPL CALC-MCNC: 116 MG/DL
NONHDLC SERPL-MCNC: 133 MG/DL
TRIGL SERPL-MCNC: 84 MG/DL

## 2024-10-23 PROCEDURE — G0463 HOSPITAL OUTPT CLINIC VISIT: HCPCS

## 2024-10-23 PROCEDURE — 99213 OFFICE O/P EST LOW 20 MIN: CPT | Mod: 25 | Performed by: PHYSICIAN ASSISTANT

## 2024-10-23 PROCEDURE — 99396 PREV VISIT EST AGE 40-64: CPT | Performed by: PHYSICIAN ASSISTANT

## 2024-10-23 RX ORDER — LISDEXAMFETAMINE DIMESYLATE 60 MG/1
60 CAPSULE ORAL DAILY
Qty: 30 CAPSULE | Refills: 0 | Status: SHIPPED | OUTPATIENT
Start: 2024-10-23

## 2024-10-23 RX ORDER — SPIRONOLACTONE 25 MG/1
25 TABLET ORAL 2 TIMES DAILY
Qty: 60 TABLET | Refills: 2 | Status: SHIPPED | OUTPATIENT
Start: 2024-10-23

## 2024-10-23 SDOH — HEALTH STABILITY: PHYSICAL HEALTH: ON AVERAGE, HOW MANY DAYS PER WEEK DO YOU ENGAGE IN MODERATE TO STRENUOUS EXERCISE (LIKE A BRISK WALK)?: 5 DAYS

## 2024-10-23 ASSESSMENT — ANXIETY QUESTIONNAIRES
8. IF YOU CHECKED OFF ANY PROBLEMS, HOW DIFFICULT HAVE THESE MADE IT FOR YOU TO DO YOUR WORK, TAKE CARE OF THINGS AT HOME, OR GET ALONG WITH OTHER PEOPLE?: VERY DIFFICULT
3. WORRYING TOO MUCH ABOUT DIFFERENT THINGS: SEVERAL DAYS
5. BEING SO RESTLESS THAT IT IS HARD TO SIT STILL: NOT AT ALL
2. NOT BEING ABLE TO STOP OR CONTROL WORRYING: SEVERAL DAYS
4. TROUBLE RELAXING: NOT AT ALL
GAD7 TOTAL SCORE: 4
1. FEELING NERVOUS, ANXIOUS, OR ON EDGE: NOT AT ALL
6. BECOMING EASILY ANNOYED OR IRRITABLE: SEVERAL DAYS
GAD7 TOTAL SCORE: 4
GAD7 TOTAL SCORE: 4
7. FEELING AFRAID AS IF SOMETHING AWFUL MIGHT HAPPEN: SEVERAL DAYS
7. FEELING AFRAID AS IF SOMETHING AWFUL MIGHT HAPPEN: SEVERAL DAYS
IF YOU CHECKED OFF ANY PROBLEMS ON THIS QUESTIONNAIRE, HOW DIFFICULT HAVE THESE PROBLEMS MADE IT FOR YOU TO DO YOUR WORK, TAKE CARE OF THINGS AT HOME, OR GET ALONG WITH OTHER PEOPLE: VERY DIFFICULT

## 2024-10-23 ASSESSMENT — PATIENT HEALTH QUESTIONNAIRE - PHQ9
SUM OF ALL RESPONSES TO PHQ QUESTIONS 1-9: 11
SUM OF ALL RESPONSES TO PHQ QUESTIONS 1-9: 11
10. IF YOU CHECKED OFF ANY PROBLEMS, HOW DIFFICULT HAVE THESE PROBLEMS MADE IT FOR YOU TO DO YOUR WORK, TAKE CARE OF THINGS AT HOME, OR GET ALONG WITH OTHER PEOPLE: EXTREMELY DIFFICULT

## 2024-10-23 ASSESSMENT — SOCIAL DETERMINANTS OF HEALTH (SDOH): HOW OFTEN DO YOU GET TOGETHER WITH FRIENDS OR RELATIVES?: ONCE A WEEK

## 2024-10-23 NOTE — PROGRESS NOTES
Preventive Care Visit  [facility]  PRUDENCE Jj, Family Medicine  Oct 23, 2024      Assessment & Plan     Routine general medical examination at a health care facility  She was in the ER yesterday with significant lower GI bleeding.  She had an appointment for her routine exam here today.  Her lab is reviewed today and her CT as well. Needing a colon screening due to family hx . Her breast exam is negative today and mom was diagnosed with   Breast cancer this spring.  Now very anxious on this.  Reassured . She lost a job as she unable to make it to work.  Is chronically fatigued. Not tired but just no energy can't pull herself out of bed.  Has longstanding depression but not feeling super depressed like she has in the past. Wanting to have the colon screening done soon. Had one done in her early 30s for chronic abdominal pain.     ADHD (attention deficit hyperactivity disorder), combined type  Refill is given. Keeps her awake barely.   - lisdexamfetamine (VYVANSE) 60 MG capsule; Take 1 capsule (60 mg) by mouth daily.    Lower GI bleeding  Bright red bleeding. No sign of diverticula . Her belly is generally tender on the right hypogastric than the left.  She is going to be given a referral as she thinks she missed her call today.  Was sleeping all day until appointment. She looks disheveled.  Not feeling well still in her belly. Scatter areas of tenderness and really no rebound or peritoneal signs. Rectal exam is not done today.   - Adult Gen Surg  Referral    Follow-up exam  ER follow up.  She doesn't need a work excuse.  Home tomorrow if needed  not repeating any labs most are completley normal.  We will check up with her in a month or so on how things are going.     Patient has been advised of split billing requirements and indicates understanding: Yes        BMI  Estimated body mass index is 32.15 kg/m  as calculated from the following:    Height as of this encounter: 1.651 m (5'  "5\").    Weight as of this encounter: 87.6 kg (193 lb 3.2 oz).   Weight management plan: Discussed healthy diet and exercise guidelines    Counseling  Appropriate preventive services were addressed with this patient via screening, questionnaire, or discussion as appropriate for fall prevention, nutrition, physical activity, Tobacco-use cessation, social engagement, weight loss and cognition.  Checklist reviewing preventive services available has been given to the patient.  Reviewed patient's diet, addressing concerns and/or questions.   The patient was instructed to see the dentist every 6 months.   The patient reports drinking more than 3 alcoholic drinks per day and/or more than 7 drhnks per week. The patient was counseled and given information about possible harmful effects of excessive alcohol intake.The patient's PHQ-9 score is consistent with moderate depression. She was provided with information regarding depression.       See Patient Instructions    No follow-ups on file.    Michael Malagon is a 39 year old, presenting for the following:  Physical        10/23/2024     3:35 PM   Additional Questions   Roomed by holly hutchins   Accompanied by none         10/23/2024     3:35 PM   Patient Reported Additional Medications   Patient reports taking the following new medications none          HPI      Health Care Directive  Patient does not have a Health Care Directive: Discussed advance care planning with patient; however, patient declined at this time.      10/23/2024   General Health   How would you rate your overall physical health? (!) FAIR   Feel stress (tense, anxious, or unable to sleep) To some extent      (!) STRESS CONCERN      10/23/2024   Nutrition   Three or more servings of calcium each day? (!) I DON'T KNOW   Diet: Regular (no restrictions)   How many servings of fruit and vegetables per day? (!) 0-1   How many sweetened beverages each day? 0-1            10/23/2024   Exercise   Days per week of " moderate/strenous exercise 5 days            10/23/2024   Social Factors   Frequency of gathering with friends or relatives Once a week   Worry food won't last until get money to buy more No   Food not last or not have enough money for food? No   Do you have housing? (Housing is defined as stable permanent housing and does not include staying ouside in a car, in a tent, in an abandoned building, in an overnight shelter, or couch-surfing.) Yes   Are you worried about losing your housing? No   Lack of transportation? No   Unable to get utilities (heat,electricity)? No            10/23/2024   Dental   Dentist two times every year? (!) NO            10/23/2024   TB Screening   Were you born outside of the [place]? No          Today's PHQ-9 Score:       10/23/2024     2:43 PM   PHQ-9 SCORE   PHQ-9 Total Score MyChart 11 (Moderate depression)   PHQ-9 Total Score 11        Patient-reported         10/23/2024   Substance Use   Alcohol more than 3/day or more than 7/wk Yes   How often do you have a drink containing alcohol 2 to 3 times a week   How many alcohol drinks on typical day 1 or 2   How often do you have 5+ drinks at one occasion Monthly   Audit 2/3 Score 2   How often not able to stop drinking once started Never   How often failed to do what normally expected Never   How often needed first drink in am after a heavy drinking session Never   How often feeling of guilt or remorse after drinking Never   How often unable to remember what happened the night before Never   Have you or someone else been injured because of your drinking No   Has anyone been concerned or suggested you cut down on drinking No   TOTAL SCORE - AUDIT 5   Do you use any other substances recreationally? No        Social History     Tobacco Use    Smoking status: Former     Current packs/day: 0.00     Types: Cigarettes     Start date: 2010     Quit date: 2020     Years since quittin.7    Smokeless tobacco: Never    Tobacco comments:      quit 2011. longest tobacco free 1 year. no passive exposure   Vaping Use    Vaping status: Never Used   Substance Use Topics    Alcohol use: Yes     Comment: rarely    Drug use: No           7/22/2024   LAST FHS-7 RESULTS   1st degree relative breast or ovarian cancer Yes   2 or more relatives with breast AND/OR ovarian cancer Yes   2 or more relatives with breast AND/OR bowel cancer Yes           Mammogram Screening - Mammogram every 1-2 years updated in Health Maintenance based on mutual decision making          10/23/2024   One time HIV Screening   Previous HIV test? Yes          10/23/2024   STI Screening   New sexual partner(s) since last STI/HIV test? No        History of abnormal Pap smear: No - age 30- 64 PAP with HPV every 5 years recommended        Latest Ref Rng & Units 1/6/2021     3:17 PM 5/14/2014    12:00 AM 4/21/2014    12:00 AM   PAP / HPV   PAP (Historical)  NIL  ASC-US  NIL    HPV 16 DNA NEG^Negative Negative      HPV 18 DNA NEG^Negative Negative      Other HR HPV NEG^Negative Negative        ASCVD Risk   The ASCVD Risk score (Eugene WALLS, et al., 2019) failed to calculate for the following reasons:    Cannot find a previous HDL lab    Cannot find a previous total cholesterol lab       Reviewed and updated as needed this visit by Provider                    Past Medical History:   Diagnosis Date    Attention deficit disorder without mention of hyperactivity 5/31/2000    Bipolar affective disorder 8/9/2012    Bulimia 8/9/2012    Self-injurious behavior 8/9/2012    Suicide attempt 8/9/2012     Past Surgical History:   Procedure Laterality Date    cyst ovary      LT    CYSTOSCOPY N/A 04/06/2022    Procedure: Cystoscopy;  Surgeon: Juan Alberto Cain MD;  [facility]    HYSTERECTOMY, PAP NO LONGER INDICATED N/A 04/06/2022    Cervix removed.    LAPAROSCOPIC ASSISTED HYSTERECTOMY VAGINAL N/A 04/06/2022    Procedure: LAPAROSCOPIC ASSISTED VAGINAL HYSTERECTOMY;  Surgeon: Juan Alberto Cain MD;   Location: [facility]    MAMMOPLASTY REDUCTION  01/01/2011    REPAIR TENDON PATELLA Left 07/10/2020    Procedure: Left knee patellorfemoral ligament repair;  Surgeon: Melchor Perkins DO;  Location: [facility]    SALPINGECTOMY Bilateral 04/06/2022    Procedure: BILATERAL SALPINGECTOMY;  Surgeon: Juan Alberto Cain MD;  [facility]    wisdom teeth removed       BP Readings from Last 3 Encounters:   10/23/24 122/84   10/22/24 122/89   03/19/24 (!) 140/86    Wt Readings from Last 3 Encounters:   10/23/24 87.6 kg (193 lb 3.2 oz)   03/19/24 88.5 kg (195 lb 1.7 oz)   12/12/23 88.5 kg (195 lb 1.7 oz)                  Patient Active Problem List   Diagnosis    Family planning    Moderate major depression (H)    Anxiety    Chronic fatigue    Encounter for routine gynecological examination    Decreased libido    Fatigue    Smoker    ASCUS (atypical squamous cells of undetermined significance) on Pap smear    NO SHOW    ACP (advance care planning)    Ligament tear    Decreased ferritin    Bipolar disorder, unspecified (H)    S/P hysterectomy     Past Surgical History:   Procedure Laterality Date    cyst ovary      LT    CYSTOSCOPY N/A 04/06/2022    Procedure: Cystoscopy;  Surgeon: Juan Alberto Cain MD;  [facility]    HYSTERECTOMY, PAP NO LONGER INDICATED N/A 04/06/2022    Cervix removed.    LAPAROSCOPIC ASSISTED HYSTERECTOMY VAGINAL N/A 04/06/2022    Procedure: LAPAROSCOPIC ASSISTED VAGINAL HYSTERECTOMY;  Surgeon: Juan Alberto Cain MD;  [facility]    MAMMOPLASTY REDUCTION  01/01/2011    REPAIR TENDON PATELLA Left 07/10/2020    Procedure: Left knee patellorfemoral ligament repair;  Surgeon: Melchor Perkins DO;  [facility]    SALPINGECTOMY Bilateral 04/06/2022    Procedure: BILATERAL SALPINGECTOMY;  Surgeon: Juan Alberto Cain MD;  [facility]    wisdom teeth removed         Social History     Tobacco Use    Smoking status: Former     Current packs/day: 0.00     Types: Cigarettes     Start date: 1/19/2010     Quit date: 1/19/2020  "    Years since quittin.7    Smokeless tobacco: Never    Tobacco comments:     quit . longest tobacco free 1 year. no passive exposure   Substance Use Topics    Alcohol use: Yes     Comment: rarely     Family History   Problem Relation Age of Onset    Cancer Maternal Grandmother         pancreatic cancer; cause of death    Cancer Father         rectal    Colon Cancer Father     Heart Disease Paternal Grandmother         heart disease    Hypertension Mother          Current Outpatient Medications   Medication Sig Dispense Refill    dicyclomine (BENTYL) 20 MG tablet Take 1 tablet (20 mg) by mouth 2 times daily for 10 days. 20 tablet 0    lisdexamfetamine (VYVANSE) 60 MG capsule Take 1 capsule (60 mg) by mouth daily. 30 capsule 0     No Known Allergies  Recent Labs   Lab Test 10/22/24  1155 23  1149 23  1337 23  0812 22  1158 22  0930 20  1540 20  1111 18  0709   A1C  --  5.1  --   --   --   --   --   --   --    ALT 21  --  34 18   < >  --   --  42 16   CR 0.65  --  0.62  --    < > 0.65  --  0.63 0.76   GFRESTIMATED >90  --  >90  --    < > >90  --  >90 86   GFRESTBLACK  --   --   --   --   --   --   --  >90 >90   POTASSIUM 4.5  --  3.8  --    < > 4.1  --  3.6 4.6   TSH  --   --   --  1.85  --  1.86   < >  --   --     < > = values in this interval not displayed.          Review of Systems  Constitutional, neuro, ENT, endocrine, pulmonary, cardiac, gastrointestinal, genitourinary, musculoskeletal, integument and psychiatric systems are negative, except as otherwise noted.     Objective    Exam  BP (!) 144/105 (BP Location: Left arm, Patient Position: Sitting, Cuff Size: Adult Regular)   Pulse 73   Temp 97.7  F (36.5  C) (Tympanic)   Resp 18   Ht 1.651 m (5' 5\")   Wt 87.6 kg (193 lb 3.2 oz)   LMP 2022   SpO2 97%   BMI 32.15 kg/m     Estimated body mass index is 32.15 kg/m  as calculated from the following:    Height as of this encounter: 1.651 m (5' " "5\").    Weight as of this encounter: 87.6 kg (193 lb 3.2 oz).    Physical Exam  GENERAL: alert and no distress  EYES: Eyes grossly normal to inspection, PERRL and conjunctivae and sclerae normal  HENT: ear canals and TM's normal, nose and mouth without ulcers or lesions  NECK: no adenopathy, no asymmetry, masses, or scars  RESP: lungs clear to auscultation - no rales, rhonchi or wheezes  BREAST: normal without masses, tenderness or nipple discharge and no palpable axillary masses or adenopathy  CV: regular rate and rhythm, normal S1 S2, no S3 or S4, no murmur, click or rub, no peripheral edema  ABDOMEN: soft, nontender, no hepatosplenomegaly, no masses and bowel sounds normal  MS: no gross musculoskeletal defects noted, no edema  SKIN: no suspicious lesions or rashes  NEURO: Normal strength and tone, mentation intact and speech normal  PSYCH: mentation appears normal, affect normal/bright  LYMPH: no cervical, supraclavicular, axillary, or inguinal adenopathy        Signed Electronically by: PRUDENCE Jj    Answers submitted by the patient for this visit:  Patient Health Questionnaire (Submitted on 10/23/2024)  If you checked off any problems, how difficult have these problems made it for you to do your work, take care of things at home, or get along with other people?: Extremely difficult  PHQ9 TOTAL SCORE: 11  Patient Health Questionnaire (G7) (Submitted on 10/23/2024)  JAZZ 7 TOTAL SCORE: 4    "

## 2024-10-25 ENCOUNTER — MYC MEDICAL ADVICE (OUTPATIENT)
Dept: FAMILY MEDICINE | Facility: OTHER | Age: 39
End: 2024-10-25

## 2024-10-25 ENCOUNTER — TELEPHONE (OUTPATIENT)
Dept: FAMILY MEDICINE | Facility: OTHER | Age: 39
End: 2024-10-25

## 2024-10-25 NOTE — LETTER
October 25, 2024      Vesna Fabian  [address]  [place] [address]        To Whom It May Concern:    Vesna Fabian was seen in our clinic on 10/23/2024. She may return to work on 10/27/2024 without restrictions.      Sincerely,        PRUDENCE Jj

## 2024-10-25 NOTE — TELEPHONE ENCOUNTER
12:47 PM    Reason for Call: Phone Call    Description: Patient was seen on Wednesday and urgent care on Tuesday. Patient needs a work note stating she can return to work on Sunday. Please call patient with any questions    Was an appointment offered for this call? No  If yes : Appointment type              Date    Preferred method for responding to this message: Telephone Call  What is your phone number ?  [phone]    If we cannot reach you directly, may we leave a detailed response at the number you provided? Yes    Can this message wait until your PCP/provider returns, if available today? YES, Provider is in    Abida Harvey

## 2024-10-25 NOTE — TELEPHONE ENCOUNTER
Patient would like this uploaded to my chart if possible that works best for patient, but again she needs it by the END of today 10/25/24. Please advise as this is urgent.

## 2024-10-31 ENCOUNTER — OFFICE VISIT (OUTPATIENT)
Dept: SURGERY | Facility: OTHER | Age: 39
End: 2024-10-31
Attending: SURGERY
Payer: COMMERCIAL

## 2024-10-31 ENCOUNTER — PREP FOR PROCEDURE (OUTPATIENT)
Dept: SURGERY | Facility: OTHER | Age: 39
End: 2024-10-31

## 2024-10-31 VITALS
HEART RATE: 98 BPM | WEIGHT: 193 LBS | OXYGEN SATURATION: 99 % | SYSTOLIC BLOOD PRESSURE: 130 MMHG | BODY MASS INDEX: 32.15 KG/M2 | HEIGHT: 65 IN | DIASTOLIC BLOOD PRESSURE: 70 MMHG | RESPIRATION RATE: 14 BRPM

## 2024-10-31 DIAGNOSIS — Z80.0 FAMILY HISTORY OF COLON CANCER: Primary | ICD-10-CM

## 2024-10-31 DIAGNOSIS — K92.2 LOWER GI BLEEDING: Primary | ICD-10-CM

## 2024-10-31 DIAGNOSIS — K62.5 RECTAL BLEEDING: ICD-10-CM

## 2024-10-31 PROCEDURE — G0463 HOSPITAL OUTPT CLINIC VISIT: HCPCS

## 2024-10-31 PROCEDURE — 99203 OFFICE O/P NEW LOW 30 MIN: CPT | Performed by: SURGERY

## 2024-10-31 ASSESSMENT — PAIN SCALES - GENERAL: PAINLEVEL_OUTOF10: NO PAIN (0)

## 2024-10-31 NOTE — PATIENT INSTRUCTIONS
Thank you for allowing Dr Zion Duffy and our surgical team to participate in your care. Please call our health unit coordinator at [phone] with scheduling questions or the nurse at [phone] with any other questions or concerns.      You have been scheduled for: COLONOSCOPY with Dr. Zion Duffy on Friday December 27th.     You will be notified the weekday before the procedure for your check in time, you can also call admitting at  after 5:00PM    You will need to have and adult  to bring you home.    SURGERY EDUCATION NURSE TO CALL ONE WEEK PRIOR TO PROCEDURE, IF YOU DO NOT HEAR FROM THEM YOU CAN CALL  [phone]    You will use [facility] bowel prep.    Please see handout for additional instruction.    You WILL NOT need a pre-operative appointment with your primary care provider.    You may call [phone] or [phone] with any questions.

## 2024-11-04 NOTE — PROGRESS NOTES
[facility] Surgery Consultation    CC:  episode of rectal bleeding    HPI:  This 39 year old year old female is seen at the request of Ivania Allen for evaluation of bleeding. She reports back at the end of October abdominal pain and bright red blood per rectum. This did prompt her to go to the ED. A CT scan was performed that was un-remarkable. She does have history of rectal cancer with her father. She has had colonoscopy before but it has been over ten years at this point. She has had more difficulty having bowel movements since having her hysterectomy     Past Medical History:   Diagnosis Date    Attention deficit disorder without mention of hyperactivity 5/31/2000    Bipolar affective disorder 8/9/2012    Bulimia 8/9/2012    Self-injurious behavior 8/9/2012    Suicide attempt 8/9/2012       Past Surgical History:   Procedure Laterality Date    cyst ovary      LT    CYSTOSCOPY N/A 04/06/2022    Procedure: Cystoscopy;  Surgeon: Jua nAlberto Cain MD;  [facility]    HYSTERECTOMY, PAP NO LONGER INDICATED N/A 04/06/2022    Cervix removed.    LAPAROSCOPIC ASSISTED HYSTERECTOMY VAGINAL N/A 04/06/2022    Procedure: LAPAROSCOPIC ASSISTED VAGINAL HYSTERECTOMY;  Surgeon: Juan Alberto Cain MD;  [facility]    MAMMOPLASTY REDUCTION  01/01/2011    REPAIR TENDON PATELLA Left 07/10/2020    Procedure: Left knee patellorfemoral ligament repair;  Surgeon: Melchor Perkins DO;  [facility]    SALPINGECTOMY Bilateral 04/06/2022    Procedure: BILATERAL SALPINGECTOMY;  Surgeon: Juan Alberto Cain MD;  [facility]: [facility]    wisdom teeth removed         No Known Allergies    Current Outpatient Medications   Medication Sig Dispense Refill    lisdexamfetamine (VYVANSE) 60 MG capsule Take 1 capsule (60 mg) by mouth daily. 30 capsule 0    spironolactone (ALDACTONE) 25 MG tablet Take 1 tablet (25 mg) by mouth 2 times daily. 60 tablet 2     No current facility-administered medications for this visit.       HABITS:    Social History  "    Tobacco Use    Smoking status: Former     Current packs/day: 0.00     Types: Cigarettes     Start date: 2010     Quit date: 2020     Years since quittin.7    Smokeless tobacco: Never    Tobacco comments:     quit . longest tobacco free 1 year. no passive exposure   Vaping Use    Vaping status: Never Used   Substance Use Topics    Alcohol use: Yes     Comment: rarely    Drug use: No       Family History   Problem Relation Age of Onset    Cancer Maternal Grandmother         pancreatic cancer; cause of death    Cancer Father         rectal    Colon Cancer Father     Heart Disease Paternal Grandmother         heart disease    Hypertension Mother        REVIEW OF SYSTEMS:  Ten point review of systems negative except those mentioned in the HPI.     OBJECTIVE:    /70 (BP Location: Left arm, Cuff Size: Adult Regular)   Pulse 98   Resp 14   Ht 1.651 m (5' 5\")   Wt 87.5 kg (193 lb)   LMP 2022   SpO2 99%   BMI 32.12 kg/m      GENERAL: Generally appears well, in no distress with appropriate affect.  HEENT:   Sclerae anicteric - normocephalic atraumatic   Respiratory:  No acute distress, no splinting, clear to auscultation   Cardiovascular:  Regular Rate and Rhythm  Abdomen: non-distended   :  deferred  Extremities:  Extremities normal. No deformities, edema, or skin discoloration.  Skin:  no suspicious lesions or rashes  Neurological: grossly intact  Psych:  Alert, oriented, affect appropriate with normal decision making ability.    IMPRESSION:    Episode of bright red blood per rectum with family history of rectal cancer, did discuss that this most likely hemorrhoid but should prove it with colonoscopy. She is in agreement. I did review her CT scan and was nothing concerning noted. Did discuss fiber supplementation as well.     PLAN:    Colonoscopy.     Zion Duffy MD,     2024  9:29 AM     "

## 2024-11-12 ENCOUNTER — OFFICE VISIT (OUTPATIENT)
Dept: FAMILY MEDICINE | Facility: OTHER | Age: 39
End: 2024-11-12
Attending: STUDENT IN AN ORGANIZED HEALTH CARE EDUCATION/TRAINING PROGRAM
Payer: COMMERCIAL

## 2024-11-12 VITALS
WEIGHT: 198.6 LBS | HEART RATE: 71 BPM | TEMPERATURE: 98.3 F | HEIGHT: 65 IN | DIASTOLIC BLOOD PRESSURE: 84 MMHG | OXYGEN SATURATION: 99 % | BODY MASS INDEX: 33.09 KG/M2 | RESPIRATION RATE: 16 BRPM | SYSTOLIC BLOOD PRESSURE: 132 MMHG

## 2024-11-12 DIAGNOSIS — F90.2 ATTENTION DEFICIT HYPERACTIVITY DISORDER (ADHD), COMBINED TYPE: Primary | ICD-10-CM

## 2024-11-12 DIAGNOSIS — Z76.89 ENCOUNTER TO ESTABLISH CARE: ICD-10-CM

## 2024-11-12 PROCEDURE — G0463 HOSPITAL OUTPT CLINIC VISIT: HCPCS

## 2024-11-12 RX ORDER — METHYLPHENIDATE HYDROCHLORIDE 36 MG/1
36 TABLET ORAL EVERY MORNING
Qty: 30 TABLET | Refills: 0 | Status: SHIPPED | OUTPATIENT
Start: 2024-11-12

## 2024-11-12 ASSESSMENT — ANXIETY QUESTIONNAIRES
1. FEELING NERVOUS, ANXIOUS, OR ON EDGE: SEVERAL DAYS
2. NOT BEING ABLE TO STOP OR CONTROL WORRYING: MORE THAN HALF THE DAYS
4. TROUBLE RELAXING: SEVERAL DAYS
IF YOU CHECKED OFF ANY PROBLEMS ON THIS QUESTIONNAIRE, HOW DIFFICULT HAVE THESE PROBLEMS MADE IT FOR YOU TO DO YOUR WORK, TAKE CARE OF THINGS AT HOME, OR GET ALONG WITH OTHER PEOPLE: VERY DIFFICULT
7. FEELING AFRAID AS IF SOMETHING AWFUL MIGHT HAPPEN: SEVERAL DAYS
7. FEELING AFRAID AS IF SOMETHING AWFUL MIGHT HAPPEN: SEVERAL DAYS
5. BEING SO RESTLESS THAT IT IS HARD TO SIT STILL: SEVERAL DAYS
GAD7 TOTAL SCORE: 9
8. IF YOU CHECKED OFF ANY PROBLEMS, HOW DIFFICULT HAVE THESE MADE IT FOR YOU TO DO YOUR WORK, TAKE CARE OF THINGS AT HOME, OR GET ALONG WITH OTHER PEOPLE?: VERY DIFFICULT
GAD7 TOTAL SCORE: 9
6. BECOMING EASILY ANNOYED OR IRRITABLE: SEVERAL DAYS
3. WORRYING TOO MUCH ABOUT DIFFERENT THINGS: MORE THAN HALF THE DAYS
GAD7 TOTAL SCORE: 9

## 2024-11-12 ASSESSMENT — PATIENT HEALTH QUESTIONNAIRE - PHQ9
SUM OF ALL RESPONSES TO PHQ QUESTIONS 1-9: 13
SUM OF ALL RESPONSES TO PHQ QUESTIONS 1-9: 13
10. IF YOU CHECKED OFF ANY PROBLEMS, HOW DIFFICULT HAVE THESE PROBLEMS MADE IT FOR YOU TO DO YOUR WORK, TAKE CARE OF THINGS AT HOME, OR GET ALONG WITH OTHER PEOPLE: EXTREMELY DIFFICULT

## 2024-11-12 ASSESSMENT — PAIN SCALES - GENERAL: PAINLEVEL_OUTOF10: NO PAIN (0)

## 2024-11-12 NOTE — PROGRESS NOTES
{PROVIDER CHARTING PREFERENCE:565332}    Michael Malagon is a 39 year old, presenting for the following health issues:  No chief complaint on file.        11/12/2024     4:34 PM   Additional Questions   Roomed by Kayla Price   Accompanied by self         11/12/2024     4:34 PM   Patient Reported Additional Medications   Patient reports taking the following new medications none     History of Present Illness       Reason for visit:  Adhd auto immune/thyroid    She eats 2-3 servings of fruits and vegetables daily.She consumes 1 sweetened beverage(s) daily.She exercises with enough effort to increase her heart rate 10 to 19 minutes per day.  She exercises with enough effort to increase her heart rate 4 days per week. She is missing 2 dose(s) of medications per week.  She is not taking prescribed medications regularly due to remembering to take.       Has been trying to figure out what is wrong with her   Tired all the time.  Fatigue, feels like there is no   No amount of sleep  Lost job because of it.   Was a Manager at [employer]  Could not get out of bed, not showering sometimes  Depression?  Fibro?  On Vyvanse 60 mg and has been on it since 2016- feels like it doesn't do anything for her anymore  Has tried cocaine in the past- even with that would fall asleep  Had a hysterectomy, since then she says she feels worse, has gained a lot of weight.  Cannot shed no matter what exercise and eating habits  Attention span is 3 seconds long.  Cannot form routine.    Feels depressed about how she feels.  Comes in waves, sometimes really bad and this correlates with fatigue.  Family is really supportive.  Tells me that this must be tiring for everyone.      Has a 21 year old son and 18 year old daughter.    IUD nexplanon nuvaring.    Was on latuda- bipolar II with self harm  Was trying antiinflammatory diet.    Got a new job, doesn't have days off yet and wants to keep job.   at [employer] in [place].       Depression   How are you doing with your depression since your last visit? No change  Are you having other symptoms that might be associated with depression? Yes:  fatigue   Have you had a significant life event?  No   Are you feeling anxious or having panic attacks?   No  Do you have any concerns with your use of alcohol or other drugs? No    Social History     Tobacco Use    Smoking status: Former     Current packs/day: 0.00     Types: Cigarettes     Start date: 2010     Quit date: 2020     Years since quittin.8    Smokeless tobacco: Never    Tobacco comments:     quit . longest tobacco free 1 year. no passive exposure   Vaping Use    Vaping status: Never Used   Substance Use Topics    Alcohol use: Yes     Comment: rarely    Drug use: No         2023    11:01 AM 2023    10:12 AM 10/23/2024     2:43 PM   PHQ   PHQ-9 Total Score 0 11 11    Q9: Thoughts of better off dead/self-harm past 2 weeks Not at all Not at all Not at all        Patient-reported         2023    10:13 AM 3/19/2024    11:27 AM 10/23/2024     2:44 PM   JAZZ-7 SCORE   Total Score   4 (minimal anxiety)   Total Score 2 0 4        Patient-reported         2024     4:12 PM   Last PHQ-9   1.  Little interest or pleasure in doing things 1    2.  Feeling down, depressed, or hopeless 1    3.  Trouble falling or staying asleep, or sleeping too much 3    4.  Feeling tired or having little energy 3    5.  Poor appetite or overeating 1    6.  Feeling bad about yourself 2    7.  Trouble concentrating 2    8.  Moving slowly or restless 0    Q9: Thoughts of better off dead/self-harm past 2 weeks 0    PHQ-9 Total Score 13        Patient-reported         2024     4:13 PM   JAZZ-7    1. Feeling nervous, anxious, or on edge 1    2. Not being able to stop or control worrying 2    3. Worrying too much about different things 2    4. Trouble relaxing 1    5. Being so restless that it is hard to sit still 1    6. Becoming  easily annoyed or irritable 1    7. Feeling afraid, as if something awful might happen 1    JAZZ-7 Total Score 9    If you checked any problems, how difficult have they made it for you to do your work, take care of things at home, or get along with other people? Very difficult        Patient-reported       ADHD  Onset: since childhood   Description:   Easily distracted: YES  Short attention span: YES  Trouble following directions: YES   Impulsive behavior: YES   Trouble completing tasks: YES  Accompanying Signs & Symptoms:        Change in sleep pattern: either sleeps too much or not enough  Irritability at certain times of the day: YES  Socially withdrawn: YES  Depression symptoms: YES  Anxiety symptoms: at times  History:  Caffeine intake: Small  Loss of appetite: No  Healthy diet: No  Did you have problems in school or with previous employment: YES  Family history of ADHD: No  Have you had an evaluation for ADHD in the past: YES  Do you use alcohol or drugs: YES- drinks one night a weekend    Therapies tried: VYVANSE with no relief     Suicide Assessment Five-step Evaluation and Treatment (SAFE-T)  {Provider  Link to Depression Care Package SmartSet :898692}    {additonal problems for provider to add (Optional):385071}    {ROS Picklists (Optional):854994}      Objective    LMP 01/30/2022   There is no height or weight on file to calculate BMI.  Physical Exam   {Exam List (Optional):826378}    {Diagnostic Test Results (Optional):150358}        Signed Electronically by: Susan Severino MD  {Email feedback regarding this note to [email or web address]   :[id number]}   "Temp 98.3  F (36.8  C) (Tympanic)   Resp 16   Ht 1.651 m (5' 5\")   Wt 90.1 kg (198 lb 9.6 oz)   LMP 01/30/2022   SpO2 99%   BMI 33.05 kg/m    Body mass index is 33.05 kg/m .  Physical Exam   GENERAL: alert and no distress  EYES: Eyes grossly normal to inspection, PERRL and conjunctivae and sclerae normal  HENT: ear canals and TM's normal, nose and mouth without ulcers or lesions  NECK: no adenopathy, no asymmetry, masses, or scars  RESP: lungs clear to auscultation - no rales, rhonchi or wheezes  CV: regular rate and rhythm, normal S1 S2, no S3 or S4, no murmur, click or rub, no peripheral edema  ABDOMEN: soft, nontender, no hepatosplenomegaly, no masses and bowel sounds normal  MS: no gross musculoskeletal defects noted, no edema  SKIN: no suspicious lesions or rashes  NEURO: Normal strength and tone, mentation intact and speech normal  PSYCH: mentation appears normal, affect normal/bright          Signed Electronically by: Susan Severino MD    "

## 2024-12-16 ENCOUNTER — OFFICE VISIT (OUTPATIENT)
Dept: FAMILY MEDICINE | Facility: OTHER | Age: 39
End: 2024-12-16
Attending: STUDENT IN AN ORGANIZED HEALTH CARE EDUCATION/TRAINING PROGRAM
Payer: COMMERCIAL

## 2024-12-16 VITALS
SYSTOLIC BLOOD PRESSURE: 136 MMHG | DIASTOLIC BLOOD PRESSURE: 94 MMHG | TEMPERATURE: 98.4 F | BODY MASS INDEX: 32.72 KG/M2 | HEIGHT: 65 IN | OXYGEN SATURATION: 98 % | HEART RATE: 78 BPM | RESPIRATION RATE: 20 BRPM | WEIGHT: 196.4 LBS

## 2024-12-16 DIAGNOSIS — F31.81 BIPOLAR 2 DISORDER (H): ICD-10-CM

## 2024-12-16 DIAGNOSIS — F33.41 RECURRENT MAJOR DEPRESSIVE DISORDER, IN PARTIAL REMISSION (H): ICD-10-CM

## 2024-12-16 DIAGNOSIS — F90.2 ADHD (ATTENTION DEFICIT HYPERACTIVITY DISORDER), COMBINED TYPE: Primary | ICD-10-CM

## 2024-12-16 DIAGNOSIS — I15.9 SECONDARY HYPERTENSION: ICD-10-CM

## 2024-12-16 PROCEDURE — 99214 OFFICE O/P EST MOD 30 MIN: CPT | Performed by: STUDENT IN AN ORGANIZED HEALTH CARE EDUCATION/TRAINING PROGRAM

## 2024-12-16 PROCEDURE — G0463 HOSPITAL OUTPT CLINIC VISIT: HCPCS

## 2024-12-16 RX ORDER — BUPROPION HYDROCHLORIDE 150 MG/1
150 TABLET ORAL EVERY MORNING
Qty: 30 TABLET | Refills: 0 | Status: SHIPPED | OUTPATIENT
Start: 2024-12-16 | End: 2024-12-16

## 2024-12-16 RX ORDER — BUPROPION HYDROCHLORIDE 150 MG/1
150 TABLET ORAL EVERY MORNING
Qty: 60 TABLET | Refills: 0 | Status: SHIPPED | OUTPATIENT
Start: 2024-12-16

## 2024-12-16 ASSESSMENT — PAIN SCALES - GENERAL: PAINLEVEL_OUTOF10: SEVERE PAIN (6)

## 2024-12-16 NOTE — PROGRESS NOTES
Assessment & Plan     ADHD (attention deficit hyperactivity disorder), combined type  On concerta, doesn't feel that it is working.  Was on vyvanse in the past which did work well for her but then it seemed to have stopped working.   Recommend caution with stimulants as she is hypertensive.  Will try wellbutrin- reviewed risks benefits and alternative  Start with 150 mg and increase to 300 mg once daily  Follow-up in 4 weeks  Also smokes and is trying to quit so we will see if this helps with that as well  - Adult Mental Health  Referral; Future  - buPROPion (WELLBUTRIN XL) 150 MG 24 hr tablet; Take 1 tablet (150 mg) by mouth every morning.    Recurrent major depressive disorder, in partial remission (H)  Referral  - Adult Mental Health  Referral; Future    Bipolar 2 disorder (H)  Recommend evaluation with Behavior health here at [facility].  Patient is agreeable to this  - Adult Mental Health  Referral; Future  - buPROPion (WELLBUTRIN XL) 150 MG 24 hr tablet; Take 1 tablet (150 mg) by mouth every morning.    Secondary hypertension  BP is elevated today.  High on my repeat.  She feels 'off' and that her BP is high  No CP, no SOB and no palpitations.  Does have history of HTN for which she takes spironolactone 25 mg daily.   No family history of HTN and I do think that the elevated BP is due to stimulant use.  The concerta is not doing much for her.  I would advise to avoid stimulants for now.          Nicotine/Tobacco Cessation  She reports that she has been smoking cigarettes. She started smoking about 14 years ago. She has never used smokeless tobacco.  Nicotine/Tobacco Cessation Plan  Pharmacotherapies : other wellbutrin        Return in about 4 weeks (around 1/13/2025) for Follow up.    Michael Malagon is a 39 year old, presenting for the following health issues:  A.D.H.D (Follow up medication)        12/16/2024     2:28 PM   Additional Questions   Roomed by Maryjo garduno  "  Accompanied by self         12/16/2024     2:28 PM   Patient Reported Additional Medications   Patient reports taking the following new medications None     History of Present Illness       Reason for visit:  Add meds    She eats 0-1 servings of fruits and vegetables daily.She consumes 1 sweetened beverage(s) daily.She exercises with enough effort to increase her heart rate 9 or less minutes per day.  She exercises with enough effort to increase her heart rate 3 or less days per week. She is missing 1 dose(s) of medications per week.     ADHD Follow-Up    Date of last ADHD office visit: 11/12/2024  Status since last visit: Stable and Worse: \"trying to find the right medication/dosage\"  Taking controlled (daily) medications as prescribed: Yes                       Parent/Patient Concerns with Medications: None    On methylphenidate 36 mg, this was started one month ago.  Struggling most in the morning and in the afternoon.   Wakes up at 6- takes medicine with coffee and starts with at 9 am  Vyvanse was better in the beginning but then it wore off  Symptoms currently not controlled- erratic thought pattern- cleaning house is atrocious- forgetful.  Trying to multitask and not seeing things  At work and trying to keep it together so not as noticeable but then feels pooped for the rest of the day  Cannot stay on task.  Brain doesn't shut off.    Doesn't feel that the methylphenidate is working.   Bipolar- on latuda- on the 5th floor for a while  Tried to overdose on Klopinin   ADHD Medication       Stimulants - Misc. Disp Start End     methylphenidate HCl ER, OSM, (CONCERTA) 36 MG CR tablet 30 tablet 12/13/2024 --    Sig - Route: Take 1 tablet (36 mg) by mouth every morning. - Oral    Class: E-Prescribe    Earliest Fill Date: 12/13/2024       Amphetamines Disp Start End     lisdexamfetamine (VYVANSE) 60 MG capsule 30 capsule 12/13/2024 --    Sig - Route: Take 1 capsule (60 mg) by mouth daily. - Oral    Class: " "E-Prescribe    Earliest Fill Date: 12/13/2024                Sleep: trouble falling asleep and trouble staying asleep  Home/Family Concerns: None  Peer Concerns: None    Co-Morbid Diagnosis: bipolar II    Currently in counseling: No      Medication Benefits:   Controlled symptoms: None  Uncontrolled Symptoms : Attention span, Distractability, and Finishing tasks    Medication side effects:  Side effects noted: none  Denies : none          Review of Systems  Constitutional, HEENT, cardiovascular, pulmonary, GI, , musculoskeletal, neuro, skin, endocrine and psych systems are negative, except as otherwise noted.      Objective    BP (!) 136/94   Pulse 78   Temp 98.4  F (36.9  C) (Tympanic)   Resp 20   Ht 1.651 m (5' 5\")   Wt 89.1 kg (196 lb 6.4 oz)   LMP 01/30/2022   SpO2 98%   BMI 32.68 kg/m    Body mass index is 32.68 kg/m .  Physical Exam   GENERAL: alert and no distress  EYES: Eyes grossly normal to inspection, PERRL and conjunctivae and sclerae normal  HENT: ear canals and TM's normal, nose and mouth without ulcers or lesions  NECK: no adenopathy, no asymmetry, masses, or scars  RESP: lungs clear to auscultation - no rales, rhonchi or wheezes  CV: regular rate and rhythm, normal S1 S2, no S3 or S4, no murmur, click or rub, no peripheral edema  ABDOMEN: soft, nontender, no hepatosplenomegaly, no masses and bowel sounds normal  MS: no gross musculoskeletal defects noted, no edema  SKIN: no suspicious lesions or rashes  NEURO: Normal strength and tone, mentation intact and speech normal  PSYCH: mentation appears normal, affect normal/bright          Signed Electronically by: Susan Severino MD    "

## 2024-12-17 RX ORDER — DEXAMETHASONE SODIUM PHOSPHATE 10 MG/ML
4 INJECTION, SOLUTION INTRAMUSCULAR; INTRAVENOUS
Status: CANCELLED | OUTPATIENT
Start: 2024-12-17

## 2024-12-17 RX ORDER — ONDANSETRON 4 MG/1
4 TABLET, ORALLY DISINTEGRATING ORAL EVERY 30 MIN PRN
Status: CANCELLED | OUTPATIENT
Start: 2024-12-17

## 2024-12-17 RX ORDER — NALOXONE HYDROCHLORIDE 0.4 MG/ML
0.1 INJECTION, SOLUTION INTRAMUSCULAR; INTRAVENOUS; SUBCUTANEOUS
Status: CANCELLED | OUTPATIENT
Start: 2024-12-17

## 2024-12-17 RX ORDER — ONDANSETRON 2 MG/ML
4 INJECTION INTRAMUSCULAR; INTRAVENOUS EVERY 30 MIN PRN
Status: CANCELLED | OUTPATIENT
Start: 2024-12-17

## 2024-12-27 ENCOUNTER — HOSPITAL ENCOUNTER (OUTPATIENT)
Facility: HOSPITAL | Age: 39
Discharge: HOME | End: 2024-12-27
Attending: SURGERY | Admitting: SURGERY
Payer: COMMERCIAL

## 2024-12-27 VITALS
DIASTOLIC BLOOD PRESSURE: 97 MMHG | RESPIRATION RATE: 16 BRPM | TEMPERATURE: 98.9 F | HEART RATE: 67 BPM | OXYGEN SATURATION: 97 % | BODY MASS INDEX: 31.25 KG/M2 | SYSTOLIC BLOOD PRESSURE: 132 MMHG | HEIGHT: 65 IN | WEIGHT: 187.6 LBS

## 2024-12-27 PROCEDURE — 710N000012 HC RECOVERY PHASE 2, PER MINUTE: Performed by: SURGERY

## 2024-12-27 PROCEDURE — 88305 TISSUE EXAM BY PATHOLOGIST: CPT | Mod: TC | Performed by: SURGERY

## 2024-12-27 PROCEDURE — 370N000017 HC ANESTHESIA TECHNICAL FEE, PER MIN: Performed by: SURGERY

## 2024-12-27 PROCEDURE — 999N000141 HC STATISTIC PRE-PROCEDURE NURSING ASSESSMENT: Performed by: SURGERY

## 2024-12-27 PROCEDURE — 258N000003 HC RX IP 258 OP 636: Performed by: NURSE PRACTITIONER

## 2024-12-27 PROCEDURE — 272N000001 HC OR GENERAL SUPPLY STERILE: Performed by: SURGERY

## 2024-12-27 PROCEDURE — 360N000075 HC SURGERY LEVEL 2, PER MIN: Performed by: SURGERY

## 2024-12-27 PROCEDURE — 45380 COLONOSCOPY AND BIOPSY: CPT | Mod: PT | Performed by: SURGERY

## 2024-12-27 PROCEDURE — 88305 TISSUE EXAM BY PATHOLOGIST: CPT | Mod: 26 | Performed by: PATHOLOGY

## 2024-12-27 RX ORDER — NALOXONE HYDROCHLORIDE 0.4 MG/ML
0.2 INJECTION, SOLUTION INTRAMUSCULAR; INTRAVENOUS; SUBCUTANEOUS
Status: CANCELLED | OUTPATIENT
Start: 2024-12-27

## 2024-12-27 RX ORDER — LIDOCAINE 40 MG/G
CREAM TOPICAL
Status: DISCONTINUED | OUTPATIENT
Start: 2024-12-27 | End: 2024-12-27 | Stop reason: HOSPADM

## 2024-12-27 RX ORDER — FLUMAZENIL 0.1 MG/ML
0.2 INJECTION, SOLUTION INTRAVENOUS
Status: CANCELLED | OUTPATIENT
Start: 2024-12-27 | End: 2024-12-27

## 2024-12-27 RX ORDER — NALOXONE HYDROCHLORIDE 0.4 MG/ML
0.4 INJECTION, SOLUTION INTRAMUSCULAR; INTRAVENOUS; SUBCUTANEOUS
Status: CANCELLED | OUTPATIENT
Start: 2024-12-27

## 2024-12-27 RX ORDER — SODIUM CHLORIDE, SODIUM LACTATE, POTASSIUM CHLORIDE, CALCIUM CHLORIDE 600; 310; 30; 20 MG/100ML; MG/100ML; MG/100ML; MG/100ML
INJECTION, SOLUTION INTRAVENOUS CONTINUOUS
Status: DISCONTINUED | OUTPATIENT
Start: 2024-12-27 | End: 2024-12-27 | Stop reason: HOSPADM

## 2024-12-27 RX ADMIN — SODIUM CHLORIDE, POTASSIUM CHLORIDE, SODIUM LACTATE AND CALCIUM CHLORIDE: 600; 310; 30; 20 INJECTION, SOLUTION INTRAVENOUS at 09:21

## 2024-12-27 ASSESSMENT — ACTIVITIES OF DAILY LIVING (ADL)
ADLS_ACUITY_SCORE: 42
ADLS_ACUITY_SCORE: 42

## 2024-12-27 NOTE — H&P
Surgery Consult Clinic Note      RE: Vesna Fabian  : 1985        Chief Complaint:  Colon cancer screening  Hx of Blood in stools  1st degree relative with colon cancer    History of Present Illness:  I am seeing Vesna Fabian at the request of Dr. Severino for evaluation regarding meet and greet screening colonoscopy.  She had a normal colonoscopy in .  Her father was diagnosed with colon cancer in his 40's.  She had an episode of blood in her stools a few months ago; painless, stopped spontaneously.   She denies changes in bowel habits, weight loss, abdominal pain.  Previous abdominal surgeries include hysterectomy.   She has no questions regarding  bowel prep.  Reports passing clear liquid stools today.     She specifically denies fevers, chills, nausea, vomiting, chest pain, shortness of breath, palpitations, sore throat, cough, or generalized feeling ill.       Medical history:  Past Medical History:   Diagnosis Date    Attention deficit disorder without mention of hyperactivity 2000    Bipolar affective disorder 2012    Bulimia 2012    Self-injurious behavior 2012    Suicide attempt 2012       Surgical history:  Past Surgical History:   Procedure Laterality Date    cyst ovary      LT    CYSTOSCOPY N/A 2022    Procedure: Cystoscopy;  Surgeon: Juan Alberto Cain MD;  Location: [facility] OR    HYSTERECTOMY, PAP NO LONGER INDICATED N/A 2022    Cervix removed.    LAPAROSCOPIC ASSISTED HYSTERECTOMY VAGINAL N/A 2022    Procedure: LAPAROSCOPIC ASSISTED VAGINAL HYSTERECTOMY;  Surgeon: Juan Alberto Cain MD;  Location: [facility] OR    MAMMOPLASTY REDUCTION  2011    REPAIR TENDON PATELLA Left 07/10/2020    Procedure: Left knee patellorfemoral ligament repair;  Surgeon: Melchor Perkins DO;  Location: [facility]    SALPINGECTOMY Bilateral 2022    Procedure: BILATERAL SALPINGECTOMY;  Surgeon: Juan Alberto Cain MD;  Location: [facility] OR    wisdom teeth removed         Family history:  Family  History   Problem Relation Age of Onset    Cancer Maternal Grandmother         pancreatic cancer; cause of death    Cancer Father         rectal    Colon Cancer Father     Heart Disease Paternal Grandmother         heart disease    Hypertension Mother        Medications:  Prior to Admission medications    Medication Sig Start Date End Date Taking? Authorizing Provider   buPROPion (WELLBUTRIN XL) 150 MG 24 hr tablet Take 1 tablet (150 mg) by mouth every morning. 24  Yes Susan Severino MD   methylphenidate HCl ER, OSM, (CONCERTA) 36 MG CR tablet Take 1 tablet (36 mg) by mouth every morning. 24  Yes Susan Severino MD   spironolactone (ALDACTONE) 25 MG tablet Take 1 tablet (25 mg) by mouth 2 times daily.  Patient taking differently: Take 25 mg by mouth daily. 10/23/24  Yes Dixie Chun PA       Allergies:  The patient has No Known Allergies.  .  Social history:  Social History     Tobacco Use    Smoking status: Some Days     Current packs/day: 0.00     Types: Cigarettes     Start date: 2010     Last attempt to quit: 2020     Years since quittin.9    Smokeless tobacco: Never   Substance Use Topics    Alcohol use: Yes     Comment: rarely     Marital status: single.    Review of Systems:    Constitutional: Negative for fever, chills.   HENT: Negative for ear pain, congestion, sore throat, and ear discharge.    Eyes: Negative for blurred vision, double vision.   Respiratory: Negative for cough, hemoptysis, shortness of breath, wheezing and stridor.    Cardiovascular: Negative for chest pain, palpitations and orthopnea.   Gastrointestinal: Negative for heartburn, nausea, vomiting, abdominal pain and blood in stool.   Genitourinary: Negative for urgency, frequency   Musculoskeletal: Negative for myalgias, back pain and joint pain.   Neurological: Negative for tingling, speech change and headaches.   Endo/Heme/Allergies: Does not bruise/bleed easily.   Psychiatric/Behavioral: Negative for  "depression, suicidal ideas and hallucinations. The patient is not nervous/anxious.    Physical Examination:  /86 (BP Location: Right arm, Patient Position: Sitting, Cuff Size: Adult Regular)   Pulse 74   Temp 98.8  F (37.1  C) (Tympanic)   Resp 20   Ht 1.651 m (5' 5\")   Wt 85.1 kg (187 lb 9.6 oz)   LMP 01/30/2022   SpO2 94%   BMI 31.22 kg/m    General: Alert and orientedx4, no acute distress, well-developed/well-nourished, ambulating without assistance  HEENT: normocephalic atraumatic, extraocular movements intact, sclerae anicteric, Trachea midline  Chest:   Clear to auscultation bilaterally.  Cardiac: S1S2 , regular rate and rhythm without additional sounds  Abdomen: Soft, non-tender, non-distended  Extremities: Cursory exam unremarkable.  No peripheral edema noted.  Skin: Warm, dry, less than 2 sec cap refill  Neuro: CN 2-12 grossly intact, no focal deficit, GCS 15  Psych: Pleasant, calm, asks appropriate questions      Assessment/Plan:  #1 Colonoscopy  #2 Personal history of blood in stools  #3 1st degree relative with colon cancer    Vesna Fabian and I had a discussion about colonoscopies.  The indications, risks, benefits, althernatives and technical aspects of whole colon colonoscopy were outlined with risks including, but not limited to, perforation, bleeding and inability to visualize entire colon.  Management of each was reviewed including the risk for life saving surgery and possible admittance to the hospital.  The need of mechanical preparation of the colon was reviewed along with the use of monitored anesthetic care.  Her questions were asked and answered.  We will proceed with colonoscopy with Dr. Duffy as scheduled.  Myrtle Leos [facility]  [address] [place] [address]    [id number]    Referring Provider:  No referring provider defined for this encounter.     Primary Care Provider:  Susan Severino   "

## 2024-12-27 NOTE — OP NOTE
Vesna Fabian MRN# [id number]   YOB: 1985 Age: 39 year old      Date of Admission:  12/27/2024  Date of Service:   12/27/24    Primary care provider: Susan Severino    PREOPERATIVE DIAGNOSIS:  Colon Cancer Screening        POSTOPERATIVE DIAGNOSIS:  Colon polyp x 1, sigmoid diverticulosis.          PROCEDURE:  Colonoscopy with polypectomy           INDICATIONS:  Screening colonoscopy.      Specimen:   ID Type Source Tests Collected by Time Destination   1 : colon polyp at 45 cm Polyp Large Intestine, Colon SURGICAL PATHOLOGY EXAM Zion Duffy MD 12/27/2024 10:10 AM        SURGEON: Zion Duffy MD    DESCRIPTION OF PROCEDURE: Vesna Fabian was brought into the endoscopy suite and placed in the left lateral decubitus position. After preprocedural pause and attended monitored anesthesia was administered, the external anus was inspected and was normal. Digital rectal exam was normal. The colonoscope was inserted and advanced under direct visualization to the level of the cecum which was identified by the appendiceal orifice and the ileocecal valve. The prep was excellent.. Upon slow withdrawal of the colonoscope, approximately 95% of the mucosa was directly visualized. There was a small polyp in sigmoid colon removed with biopsy forceps. There was noted to be mild sigmoid diverticulosis.  The rest of the colon was without mucosal abnormality. There was no evidence of further polyps, inflammation, bleeding or AVMs. Retroflexion of the rectum was normal. The extra air was removed from the colon, and the colonoscope withdrawn. The patient tolerated the procedure well and was taken to postanesthesia care unit.     We invite the patient to return in 5 years for follow up screening evaluation, due to family history.    Zion Duffy MD

## 2024-12-27 NOTE — OR NURSING
Patient and responsible adult given discharge instructions with no questions regarding instructions. Heather score 20/20. Pain level 0/10.  Discharged from unit via ambulatory. Patient discharged to home with , reviewed AVS prior to discharge and no questions offered, copy sent.

## 2024-12-27 NOTE — DISCHARGE INSTRUCTIONS
You had one colon polyp removed today.  Dr. Duffy's office will contact you with the pathology results when they become available.     INSTRUCTIONS AFTER COLONOSCOPY    WHEN YOU ARE BACK HOME:  Plan to rest for an hour or two after you get home.  You may have some cramping or pressure until you pass gas.  You may resume your regular medications.  Eat a small, light meal at first, and then gradually return to normal meal sizes.  You will be contacted the next day to see how you are doing.  If you had a polyp removed:  Slight bleeding may occur.  You may have a slight blood stain on the toilet paper after a bowel movement.  To lessen the chance of bleeding, avoid heavy exercise for ONE WEEK.  This includes heavy lifting, vigorous sport activities, and heavy physical labor.  You may resume your normal sexual activity.    Avoid aspirin or aspirin products if instructed by your doctor.  If there is a polyp or biopsy, you will be contacted with results within 7-10 days.     WHAT TO WATCH FOR:  Problems rarely occur after the exam; however, it is important for you to watch for early signs of possible problems.  If you have   Unusual pain in your abdomen  Nausea and vomiting that persists  Excessive bleeding  Black or bloody bowel movements  Fever or temperature above 100.6 F  Please call your doctor ([facility] [phone]) or go to the nearest hospital emergency room    .After Anesthesia (Sleep Medicine)  What should I do after anesthesia?  You should rest and relax for the next 24 hours. Avoid risky or difficult (strenuous) activity. A responsible adult should stay with you overnight.  Don't drive or use any heavy equipment for 24 hours. Even if you feel normal, your reactions may be affected by the sleep medicine given to you.  Don't drink alcohol or make any important decisions for 24 hours.  Slowly get back to your regular diet, as you feel able.  How should I expect to feel?  It's normal to feel dizzy,  light-headed, or faint for up to a full day after anesthesia or while taking pain medicine. If this happens:   Sit down for a few minutes before standing.  Have someone help you when you get up to walk or use the bathroom.  If you have nausea (feel sick to your stomach) or vomit (throw up):   Drink clear liquids (such as apple juice, ginger ale, broth, or 7UP) until you feel better.  If you feel sick to your stomach, or you keep vomiting for 24 hours, please call the doctor.  What else should I know?  You might have a dry mouth, sore throat, muscle aches, or trouble sleeping. These should go away after 24 hours.  Please contact your doctor if you have any other symptoms that concern you, such as fever, pain, bleeding, fluid drainage, swelling, or headache, or if it's been over 8 to 10 hours and you still aren't able to pee (urinate).  If you have a history of sleep apnea, it's very important to use your CPAP machine for the next 24 hours when you nap or sleep.   For informational purposes only. Not to replace the advice of your health care provider. Copyright   2023 [employer] [facility]. All rights reserved. Clinically reviewed by Dontrell Weeks MD. [employer] [id number] - REV 09/23.  Colon Polyps: Care Instructions  Your Care Instructions     Colon polyps are growths in the colon or the rectum. The cause of most colon polyps is not known, and most people who get them do not have any problems. But a certain kind can turn into cancer. For this reason, regular testing for colon polyps is important for people as they get older. It is also important for anyone who has an increased risk for colon cancer.  Polyps are usually found through routine colon cancer screening tests. Although most colon polyps are not cancerous, they are usually removed and then tested for cancer. Screening for colon cancer saves lives because the cancer can usually be cured if it is caught early.  If you have a polyp that is the type that can  "turn into cancer, you may need more tests to examine your entire colon. The doctor will remove any other polyps that are found, and you will be tested more often.  Follow-up care is a key part of your treatment and safety. Be sure to make and go to all appointments, and call your doctor if you are having problems. It's also a good idea to know your test results and keep a list of the medicines you take.  How can you care for yourself at home?  Regular exams to look for colon polyps are the best way to prevent polyps from turning into colon cancer. These can include stool tests, sigmoidoscopy, colonoscopy, and CT colonography. Talk with your doctor about a testing schedule that is right for you.  To prevent polyps  There is no home treatment that can prevent colon polyps. But these steps may help lower your risk for cancer.  Stay active. Being active can help you get to and stay at a healthy weight. Try to exercise on most days of the week. Walking is a good choice.  Eat well. Choose a variety of vegetables, fruits, legumes (such as peas and beans), fish, poultry, and whole grains.  Do not smoke. If you need help quitting, talk to your doctor about stop-smoking programs and medicines. These can increase your chances of quitting for good.  If you drink alcohol, limit how much you drink. Limit alcohol to 2 drinks a day for men and 1 drink a day for women.  When should you call for help?   Call your doctor now or seek immediate medical care if:    You have severe belly pain.     Your stools are maroon or very bloody.   Watch closely for changes in your health, and be sure to contact your doctor if:    You have a fever.     You have nausea or vomiting.     You have a change in bowel habits (new constipation or diarrhea).     Your symptoms get worse or are not improving as expected.   Where can you learn more?  Go to https://www.healthwise.net/patiented  Enter C571 in the search box to learn more about \"Colon Polyps: Care " "Instructions.\"  Current as of: October 19, 2023  Content Version: 14.3    2024 [employer].   Care instructions adapted under license by your healthcare professional. If you have questions about a medical condition or this instruction, always ask your healthcare professional. [employer] disclaims any warranty or liability for your use of this information.    "

## 2024-12-30 LAB
PATH REPORT.COMMENTS IMP SPEC: NORMAL
PATH REPORT.FINAL DX SPEC: NORMAL
PATH REPORT.GROSS SPEC: NORMAL
PATH REPORT.MICROSCOPIC SPEC OTHER STN: NORMAL
PATH REPORT.RELEVANT HX SPEC: NORMAL
PHOTO IMAGE: NORMAL

## 2025-01-11 ENCOUNTER — MYC REFILL (OUTPATIENT)
Dept: FAMILY MEDICINE | Facility: OTHER | Age: 40
End: 2025-01-11

## 2025-01-11 DIAGNOSIS — F90.2 ADHD (ATTENTION DEFICIT HYPERACTIVITY DISORDER), COMBINED TYPE: ICD-10-CM

## 2025-01-13 ENCOUNTER — MYC MEDICAL ADVICE (OUTPATIENT)
Dept: FAMILY MEDICINE | Facility: OTHER | Age: 40
End: 2025-01-13

## 2025-01-13 DIAGNOSIS — F90.2 ADHD (ATTENTION DEFICIT HYPERACTIVITY DISORDER), COMBINED TYPE: ICD-10-CM

## 2025-01-13 RX ORDER — BUPROPION HYDROCHLORIDE 150 MG/1
150 TABLET ORAL EVERY MORNING
Qty: 60 TABLET | Refills: 0 | OUTPATIENT
Start: 2025-01-13

## 2025-01-13 RX ORDER — BUPROPION HYDROCHLORIDE 150 MG/1
150 TABLET ORAL EVERY MORNING
Qty: 60 TABLET | Refills: 0 | Status: SHIPPED | OUTPATIENT
Start: 2025-01-13 | End: 2025-01-14

## 2025-01-13 NOTE — TELEPHONE ENCOUNTER
Pt reports taking two of the  bupropion 150 mg daily.  Per med list she should only be taking it once daily.        Either way she will need a refill on her bupropion.    Pended up the 150 mg daily.  Please review and sign if appropriate.  Will need to be changed to BID if okay to take it BID.

## 2025-01-14 DIAGNOSIS — F90.2 ADHD (ATTENTION DEFICIT HYPERACTIVITY DISORDER), COMBINED TYPE: ICD-10-CM

## 2025-01-14 RX ORDER — BUPROPION HYDROCHLORIDE 150 MG/1
150 TABLET ORAL EVERY MORNING
Qty: 60 TABLET | Refills: 0 | Status: SHIPPED | OUTPATIENT
Start: 2025-01-14

## 2025-02-26 ENCOUNTER — MYC REFILL (OUTPATIENT)
Dept: FAMILY MEDICINE | Facility: OTHER | Age: 40
End: 2025-02-26

## 2025-02-26 ENCOUNTER — MYC MEDICAL ADVICE (OUTPATIENT)
Dept: FAMILY MEDICINE | Facility: OTHER | Age: 40
End: 2025-02-26

## 2025-02-26 DIAGNOSIS — F90.2 ADHD (ATTENTION DEFICIT HYPERACTIVITY DISORDER), COMBINED TYPE: ICD-10-CM

## 2025-02-26 RX ORDER — BUPROPION HYDROCHLORIDE 150 MG/1
150 TABLET ORAL EVERY MORNING
Qty: 90 TABLET | Refills: 1 | Status: SHIPPED | OUTPATIENT
Start: 2025-02-26 | End: 2025-02-26

## 2025-02-26 RX ORDER — BUPROPION HYDROCHLORIDE 150 MG/1
300 TABLET ORAL EVERY MORNING
Qty: 180 TABLET | Refills: 1 | Status: SHIPPED | OUTPATIENT
Start: 2025-02-26

## 2025-02-26 NOTE — TELEPHONE ENCOUNTER
Wellbutrin 150 mg      Last Written Prescription Date:  01/14/2025  Last Fill Quantity: 60,   # refills: 0  Last Office Visit: 12/16/2024  Future Office visit:

## 2025-03-05 ENCOUNTER — APPOINTMENT (OUTPATIENT)
Dept: GENERAL RADIOLOGY | Facility: HOSPITAL | Age: 40
End: 2025-03-05
Attending: NURSE PRACTITIONER
Payer: COMMERCIAL

## 2025-03-05 ENCOUNTER — HOSPITAL ENCOUNTER (EMERGENCY)
Facility: HOSPITAL | Age: 40
Discharge: HOME | End: 2025-03-05
Attending: NURSE PRACTITIONER
Payer: COMMERCIAL

## 2025-03-05 VITALS
SYSTOLIC BLOOD PRESSURE: 161 MMHG | OXYGEN SATURATION: 96 % | RESPIRATION RATE: 18 BRPM | HEART RATE: 97 BPM | TEMPERATURE: 99.9 F | DIASTOLIC BLOOD PRESSURE: 99 MMHG

## 2025-03-05 DIAGNOSIS — B33.8 RSV INFECTION: ICD-10-CM

## 2025-03-05 LAB
FLUAV RNA SPEC QL NAA+PROBE: NEGATIVE
FLUBV RNA RESP QL NAA+PROBE: NEGATIVE
RSV RNA SPEC NAA+PROBE: POSITIVE
SARS-COV-2 RNA RESP QL NAA+PROBE: NEGATIVE

## 2025-03-05 PROCEDURE — 87637 SARSCOV2&INF A&B&RSV AMP PRB: CPT | Performed by: NURSE PRACTITIONER

## 2025-03-05 PROCEDURE — 99283 EMERGENCY DEPT VISIT LOW MDM: CPT | Performed by: NURSE PRACTITIONER

## 2025-03-05 PROCEDURE — 99284 EMERGENCY DEPT VISIT MOD MDM: CPT | Mod: 25

## 2025-03-05 PROCEDURE — 71046 X-RAY EXAM CHEST 2 VIEWS: CPT

## 2025-03-05 ASSESSMENT — ENCOUNTER SYMPTOMS
ENDOCRINE NEGATIVE: 1
HEMATOLOGIC/LYMPHATIC NEGATIVE: 1
NEUROLOGICAL NEGATIVE: 1
CHILLS: 1
COUGH: 1
FATIGUE: 1
CARDIOVASCULAR NEGATIVE: 1
MYALGIAS: 1
PSYCHIATRIC NEGATIVE: 1
EYES NEGATIVE: 1
GASTROINTESTINAL NEGATIVE: 1
SHORTNESS OF BREATH: 0
ALLERGIC/IMMUNOLOGIC NEGATIVE: 1
FEVER: 1

## 2025-03-05 NOTE — Clinical Note
Vesna Fabian was seen and treated in our emergency department on 3/5/2025.  She may return to work on 03/08/2025.       If you have any questions or concerns, please don't hesitate to call.      Regan Rodríguez APRN CNP

## 2025-03-05 NOTE — LETTER
March 5, 2025      To Whom It May Concern:      Vesna Fabian was seen in our Emergency Department today, 03/05/25.  I expect her condition to improve over the next 3 days.  She may return to work/school when improved.    Sincerely,        SUSIE Rodríguez, ANGELA/TKK, RN

## 2025-03-06 NOTE — ED TRIAGE NOTES
Patient presents with c/o cough, headaches, body aches, nasal congestion, hot and cold flashes, 102 fever at home. Symptoms started Monday.

## 2025-03-06 NOTE — DISCHARGE INSTRUCTIONS
Respiratory Syncytial Virus (RSV):     Respiratory syncytial virus (RSV) infection is a viral illness that causes symptoms like those of a bad cold, such as a runny nose, a sore throat, coughing, and wheezing. RSV spreads easily. It s most common in babies, but anyone can get it. And you can get it more than once.  RSV infection often goes away in 1 to 2 weeks. Most people who have it get better with home care.  It is important to do good hand hygiene and stay away from others until you are feeling better.  Wear a mask when you are going to be here others.  You may find it easier to sleep upright for breathing and drainage.  Try to use bulb syringe to suck out any sinus drainage from infant/children or frequently blow your nose to help with this drainage.  You may find having Vaseline on your nose and lips will help with the dryness.    Hydrate:   During this time it is important to keep well hydrated with water, juices, or low calorie sports drinks.  Using 1/2 strength G2, Gatorade Zero, or PowerAde Zero is best choice (mix half and half with water).  DO NOT use caffeinated or alcoholic beverages for hydration, as these actually dehydrate you.         Fever/pain control:   If your past medical conditions, allergies, current medications, or current status does not prevent you from using acetaminophen and/or ibuprofen, use the following: Acetaminophen 650-1000 mg every 6 hours as needed for pain/fever.  Ibuprofen 400-600 mg every 6 hours as needed for pain/fever.  These can be taken together if wanted.    Remember that these are for AS NEEDED.  If not needed, do not take.  It is important to remember that fever is beneficial in the healing process.  It is usually recommended to start using medication if/when temperature is =100.4 F  or when you have discomfort.  Studies show that not using medication for fever control shows better outcomes...            Follow-up with your primary care provider for reevaluation.   Contact your primary care provider if you have any questions or concerns.  Do not hesitate to return to the ER if any new or worsening symptoms.     Please read the attached instructions (if any).  They highlight more specific treatments and interventions for you at home.              Thank you for letting me participate in your care and wish you a fast and uneventful recovery,    Regan STEPHEN CNP    Do not hesitate to contact me with questions or concerns.  [email or web address]

## 2025-03-06 NOTE — ED PROVIDER NOTES
History     Chief Complaint   Patient presents with    Cough     HPI  Vesna Fabian is a 39 year old individual with history of major depression, anxiety, chronic fatigue, bipolar disorder, comes in for body aches, cough, fever.  Patient states developed symptoms on 3/3/2025.  States that his coughing.  Has had fever and chills.  Has body aches.  Comes in for evaluation.  No nausea or vomiting or diarrhea.  No shortness of breath.    Allergies:  No Known Allergies    Problem List:    Patient Active Problem List    Diagnosis Date Noted    S/P hysterectomy 04/06/2022     Priority: Medium     LAVH      Decreased ferritin 01/08/2021     Priority: Medium    Ligament tear 06/24/2020     Priority: Medium     Added automatically from request for surgery [id number]      ACP (advance care planning) 03/10/2016     Priority: Medium     Advance Care Planning 3/10/2016: ACP Review of Chart / Resources Provided:  Reviewed chart for advance care plan.  Vesna Fabian has been provided information and resources to begin or update their advance care plan.  Added by Kenisha Chilel            NO SHOW 06/18/2015     Priority: Medium     No shows for Dr. Valdez : 5/15/13; 4/29/15        ASCUS (atypical squamous cells of undetermined significance) on Pap smear 04/29/2014     Priority: Medium    Encounter for routine gynecological examination 04/21/2014     Priority: Medium     Problem list name updated by automated process. Provider to review      Decreased libido 04/21/2014     Priority: Medium     Sensate focus      Fatigue 04/21/2014     Priority: Medium    Smoker 04/21/2014     Priority: Medium     Ready to quit. Only 3 cigs in a week      Family planning 04/22/2013     Priority: Medium    Moderate major depression (H) 04/22/2013     Priority: Medium     Denies suicidal or homicidal ideations at this time. Working with RAÚL Molina      Anxiety 04/22/2013     Priority: Medium    Chronic fatigue 04/22/2013     Priority: Medium     Bipolar disorder, unspecified (H) 2012     Priority: Medium        Past Medical History:    Past Medical History:   Diagnosis Date    Attention deficit disorder without mention of hyperactivity 2000    Bipolar affective disorder 2012    Bulimia 2012    Self-injurious behavior 2012    Suicide attempt 2012       Past Surgical History:    Past Surgical History:   Procedure Laterality Date    COLONOSCOPY N/A 2024    Procedure: COLONOSCOPY with polypectomy;  Surgeon: Zion Duffy MD;  Location: [facility] OR    cyst ovary      LT    CYSTOSCOPY N/A 2022    Procedure: Cystoscopy;  Surgeon: Juan Alberto Cain MD;  Location: [facility] OR    HYSTERECTOMY, PAP NO LONGER INDICATED N/A 2022    Cervix removed.    LAPAROSCOPIC ASSISTED HYSTERECTOMY VAGINAL N/A 2022    Procedure: LAPAROSCOPIC ASSISTED VAGINAL HYSTERECTOMY;  Surgeon: Juan Alberto Cain MD;  Location: [facility] OR    MAMMOPLASTY REDUCTION  2011    REPAIR TENDON PATELLA Left 07/10/2020    Procedure: Left knee patellorfemoral ligament repair;  Surgeon: Melchor Perkins DO;  Location: [facility] OR    SALPINGECTOMY Bilateral 2022    Procedure: BILATERAL SALPINGECTOMY;  Surgeon: Juan Alberto Cain MD;  Location: [facility] OR    wisdom teeth removed         Family History:    Family History   Problem Relation Age of Onset    Cancer Maternal Grandmother         pancreatic cancer; cause of death    Cancer Father         rectal    Colon Cancer Father     Heart Disease Paternal Grandmother         heart disease    Hypertension Mother        Social History:  Marital Status:  Single [1]  Social History     Tobacco Use    Smoking status: Some Days     Current packs/day: 0.00     Types: Cigarettes     Start date: 2010     Last attempt to quit: 2020     Years since quittin.1    Smokeless tobacco: Never   Vaping Use    Vaping status: Never Used   Substance Use Topics    Alcohol use: Yes     Comment: rarely    Drug use: No        Medications:     buPROPion (WELLBUTRIN XL) 150 MG 24 hr tablet  methylphenidate HCl ER, OSM, (CONCERTA) 36 MG CR tablet  spironolactone (ALDACTONE) 25 MG tablet          Review of Systems   Constitutional:  Positive for chills, fatigue and fever.   HENT: Negative.     Eyes: Negative.    Respiratory:  Positive for cough. Negative for shortness of breath.    Cardiovascular: Negative.    Gastrointestinal: Negative.    Endocrine: Negative.    Genitourinary: Negative.    Musculoskeletal:  Positive for myalgias.   Skin: Negative.    Allergic/Immunologic: Negative.    Neurological: Negative.    Hematological: Negative.    Psychiatric/Behavioral: Negative.         Physical Exam   BP: (!) 161/99  Pulse: 97  Temp: 99.9  F (37.7  C)  Resp: 18  SpO2: 96 %      GENERAL APPEARANCE:  The patient is a 39 year old well-developed, well-nourished individual that appears as stated age.  LUNGS:  Breathing is easy.  Breath sounds are equal bilaterally.  Faint rhonchi in bilateral lung bases.  Harsh cough present.  HEART:  Regular rate and rhythm with normal S1 and S2.  No murmurs, gallops, or rubs.  PSYCHIATRIC:  The patient is awake, alert, and oriented x4.  Recent and remote memory is intact.  Appropriate mood and affect.  Calm and cooperative with history and physical exam.  SKIN:  Warm, dry, and well perfused.  Good turgor.  No lesions, nodules, or rashes are noted.  No bruising noted.       ED Course     ED Course as of 03/05/25 2218   Wed Mar 05, 2025   2124 Lab and chest x-ray ordered while patient in lobby.   2210 Resp Syncytial Virus(!): Positive  Patient positive for RSV.   2217 Patient will be discharged home to do acetaminophen/ibuprofen for fever and bodyaches.  Hydration therapy education given.  Follow-up and return precautions given.                 Results for orders placed or performed during the hospital encounter of 03/05/25 (from the past 24 hours)   Influenza A/B, RSV and SARS-CoV2 PCR (COVID-19) Nasopharyngeal    Specimen:  Nasopharyngeal; Swab   Result Value Ref Range    Influenza A PCR Negative Negative    Influenza B PCR Negative Negative    RSV PCR Positive (A) Negative    SARS CoV2 PCR Negative Negative    Narrative    Testing was performed using the Xpert Xpress CoV2/Flu/RSV Assay on the [employer] GeneXpert Instrument. This test should be ordered for the detection of SARS-CoV2, influenza, and RSV viruses in individuals with signs and symptoms of respiratory tract infection. This test is for in vitro diagnostic use under the [place] FDA for laboratories certified under CLIA to perform high or moderate complexity testing. This test has been [place] FDA cleared. A negative result does not rule out the presence of PCR inhibitors in the specimen or target RNA in concentration below the limit of detection for the assay. If only one viral target is positive but coinfection with multiple targets is suspected, the sample should be re-tested with another FDA cleared, approved, or authorized test, if coninfection would change clinical management. This test was validated by the [facility]. These laboratories are certified under the Clinical Laboratory Improvement Amendments of 1988 (CLIA-88) as qualified to perfom high complexity laboratory testing.   XR Chest 2 Views    Narrative    EXAM: XR CHEST 2 VIEWS  LOCATION: [facility]  DATE: 3/5/2025    INDICATION: Cough.  COMPARISON: 7/19/2023.      Impression    IMPRESSION: No focal airspace consolidation. No pleural effusion or pneumothorax.    Cardiomediastinal silhouette is normal.       Medications - No data to display    Assessments & Plan (with Medical Decision Making)     I have reviewed the nursing notes.    I have reviewed the findings, diagnosis, plan and need for follow up with the patient.    Summary:  Patient presents to the ER today for fever, body aches, cough.  Potential diagnosis which have been considered and evaluated include pneumonia, influenza, COVID,  RSV, other viral illness, as well as others. Many of these have been excluded using the various modalities and assessment as noted on the chart. At the present time, the diagnosis given seems to be the most likely RSV infection.  Upon arrival, vitals signs show blood pressure 161/99 with a pulse of 97.  Temperature 99.9  F.  Respirations 18 with oxygenation 96% on room air.  The patient is alert and oriented in no distress.  Cardiac examination normal.  Does have faint rhonchi in bilateral bases with harsh cough.  No respiratory distress.  Influenza, COVID, RSV testing conducted while in lobby and was positive for RSV.  Chest x-ray personally reviewed showing no acute cardiopulmonary abnormalities.  Patient with RSV.  Oxygenation normal.  Will discharge home to continue hydration.  Acetaminophen/ibuprofen for fever and bodyaches.  Follow-up with PCP and return to ER for new or worsening symptoms.  Patient verbalized understanding and agrees with plan of care.  Patient discharged home.        Critical Care Time: None    Impression and plan discussed with patient. Questions answered, concerns addressed, indications for urgent re-evaluation reviewed, and  given. Patient/Parent/Caregiver agree with treatment plan and have no further questions at this time.  AVS provided at discharge.    This document was prepared using a combination of typing and voice generated software.  While every attempt was made for accuracy, spelling and grammatical errors may exist.              New Prescriptions    No medications on file       Final diagnoses:   RSV infection       3/5/2025   [facility]       Regan Rodríguez APRN CNP  03/05/25 [id number]

## (undated) DEVICE — SOL-NACL 0.9% 1000ML

## (undated) DEVICE — SUTURE-VICRYL 3-0 CT-2 J232H

## (undated) DEVICE — ANTI-FOG AGENT

## (undated) DEVICE — BIN-ROTATOR CUFF (SHOULDER)

## (undated) DEVICE — CAUTERY PENCIL-SMOKE EVACUATION

## (undated) DEVICE — LIGASURE-5MM BLUNT TIP LAPAROSCOPIC

## (undated) DEVICE — BIN-ACL MENISCAL REPAIR

## (undated) DEVICE — GLV-7.0 PROTEXIS PI CLASSIC LF/PF

## (undated) DEVICE — CAUTERY PAD-POLYHESIVE II ADULT

## (undated) DEVICE — CONNECTOR ERBEFLO 2 PORT 20325-215

## (undated) DEVICE — BIN-ARTHROSCOPY CART

## (undated) DEVICE — CLEARIFY VISUALIZATION SYSTEM (SCOPE WARMER)

## (undated) DEVICE — UTERINE MANIPULATOR-KRONNER MANIPUJECTOR

## (undated) DEVICE — BDG-COBAN 6 INCH

## (undated) DEVICE — GLV-8.5 BIOGEL LATEX

## (undated) DEVICE — SOL WATER IRRIG 1000ML BOTTLE 2F7114

## (undated) DEVICE — SUCTION MANIFOLD NEPTUNE 2 SYS 1 PORT 702-025-000

## (undated) DEVICE — SUTURE-VICRYL 0 CT-2 POP-OFF J727D

## (undated) DEVICE — SUTURE-VICRYL 3-0 SH J416H

## (undated) DEVICE — IRRIGATION-NACL 1000ML

## (undated) DEVICE — LABEL-STERILE PREPRINTED FOR OR

## (undated) DEVICE — SET-TUR Y-TYPE BLADDER IRRIGATION

## (undated) DEVICE — APPLICATOR-CHLORAPREP 26ML TINTED CHG 2%+ 70% IPA-SURGICAL

## (undated) DEVICE — PACK-BASIN SET-UP

## (undated) DEVICE — DRAPE-STERI 45X60CM #1010

## (undated) DEVICE — PACK-GYN CYSTO-CUSTOM

## (undated) DEVICE — SUCTION TUBE-YANKAUR

## (undated) DEVICE — CANISTER-SUCTION 2000CC

## (undated) DEVICE — CATH TRAY-16FR METER W/STATLOCK LATEX]

## (undated) DEVICE — TOPICAL SKIN ADHESIVE EXOFIN

## (undated) DEVICE — CAST PADDING-STERILE 4"

## (undated) DEVICE — GLV-8.5 ORTHO PROTEXIS PI LF/PF

## (undated) DEVICE — TUBING-SUCTION 20FT

## (undated) DEVICE — LIGHT HANDLE COVER FOR SKYTRON LIGHTS

## (undated) DEVICE — SUTURE-MONOCRYL 3-0 PS-1 Y936H

## (undated) DEVICE — TUBING-INSUFFLATION/LAPAROFLATOR W/FILTER

## (undated) DEVICE — SUTURE-ETHILON 4-0 FS-1 1629H

## (undated) DEVICE — EXOFIN FUSION 4 X 22CM

## (undated) DEVICE — STOCKINETTE IMPERVIOUS-X/LG 12X48

## (undated) DEVICE — LIGASURE-IMPACT SEALER/DIVIDER

## (undated) DEVICE — BLADE-SCALPEL #15

## (undated) DEVICE — PACK-LAP LAVH-CUSTOM

## (undated) DEVICE — DRAPE-THREE QUARTER (LARGE) SHEET

## (undated) DEVICE — BLANKET-BAIR UPPER BODY

## (undated) DEVICE — IRRIGATION-H2O 1000ML

## (undated) DEVICE — DRAPE-EXTREMITY SHEET

## (undated) DEVICE — DRSG-KERLIX 6 X 6 3/4 FLUFF

## (undated) DEVICE — IRRIGATION-NACL 3000ML (BAG)

## (undated) DEVICE — CUFF-DISP STERILE 34IN SINGLE BLADDER

## (undated) DEVICE — BLADE-SCALPEL #10

## (undated) DEVICE — TRAY-SKIN PREP POVIDONE/IODINE

## (undated) DEVICE — BDG-ESMARK 6 INCH X 9 FT

## (undated) DEVICE — BLADE-SURG CLIPPER

## (undated) DEVICE — SUTURE-VICRYL 2-0 CT-1 J945H

## (undated) DEVICE — CORD-LAPAROSCOPIC MONOPOLAR-DISPOSABLE

## (undated) DEVICE — SPONGE-LAPAROTOMY PADS 18 X 18

## (undated) DEVICE — SUTURE-VICRYL 2-0 CT-1 J345H

## (undated) DEVICE — SUTURE-VICRYL 0 CT-1 J346H

## (undated) DEVICE — TUBING SUCTION 20FT N620A

## (undated) DEVICE — DRAPE-U DRAPE-CLEAR 47" X 51"

## (undated) DEVICE — CAUTERY-MEGADYNE TIP

## (undated) DEVICE — GOWN-SURG XL LVL 3 REINFORCED

## (undated) DEVICE — SPATULA TIP FOR SUCTION IRRIGATION PROBE

## (undated) DEVICE — Device

## (undated) DEVICE — SCD SLEEVE-KNEE REG.

## (undated) DEVICE — COVER-TABLE SHEET

## (undated) DEVICE — BDG-ELASTIC 4 INCH DBL.

## (undated) DEVICE — PACK-SET UP-CUSTOM

## (undated) DEVICE — SUCTION-IRRIGATION STRYKEFLOW II (STRYKER)

## (undated) DEVICE — SYRINGE-10CC LUER LOCK

## (undated) DEVICE — IMMOB-KNEE 20 INCH

## (undated) DEVICE — FORCEPS BIOPSY RADIAL JAW 4 LARGE W/NEEDLE 240CM M00513332

## (undated) DEVICE — TROCAR SLEEVE-KII 5X100MM

## (undated) DEVICE — CAST PADDING-STERILE 6"

## (undated) DEVICE — STERI-STRIP-1/2" X 4"

## (undated) DEVICE — PRESSURE INFUSOR DISP. 3000CC

## (undated) DEVICE — DRAPE-U DRAPE SPLIT SHEET 72" X 122"

## (undated) DEVICE — POUCH-INSTRUMENT 2 COMP. 7 X 11IN

## (undated) DEVICE — BIN-UROLOGY / CYSTO

## (undated) DEVICE — NDL-INSUFFLATION 120MM

## (undated) DEVICE — CATH-URETHRAL 14FR

## (undated) DEVICE — TROCAR-5X100MM BLADED W/FIXATION

## (undated) DEVICE — TUBING-SEECLEAR LAPAROSCOPIC SMOKE EVACUATION

## (undated) RX ORDER — ONDANSETRON 2 MG/ML
INJECTION INTRAMUSCULAR; INTRAVENOUS
Status: DISPENSED
Start: 2020-07-10

## (undated) RX ORDER — GLYCOPYRROLATE 0.2 MG/ML
INJECTION, SOLUTION INTRAMUSCULAR; INTRAVENOUS
Status: DISPENSED
Start: 2022-04-06

## (undated) RX ORDER — KETAMINE HCL IN NACL, ISO-OSM 100MG/10ML
SYRINGE (ML) INJECTION
Status: DISPENSED
Start: 2022-04-06

## (undated) RX ORDER — LIDOCAINE HYDROCHLORIDE 20 MG/ML
INJECTION, SOLUTION EPIDURAL; INFILTRATION; INTRACAUDAL; PERINEURAL
Status: DISPENSED
Start: 2022-04-06

## (undated) RX ORDER — LIDOCAINE HYDROCHLORIDE 20 MG/ML
INJECTION, SOLUTION EPIDURAL; INFILTRATION; INTRACAUDAL; PERINEURAL
Status: DISPENSED
Start: 2020-07-10

## (undated) RX ORDER — DEXAMETHASONE SODIUM PHOSPHATE 10 MG/ML
INJECTION, SOLUTION INTRAMUSCULAR; INTRAVENOUS
Status: DISPENSED
Start: 2022-04-06

## (undated) RX ORDER — NALOXONE HYDROCHLORIDE 0.4 MG/ML
INJECTION, SOLUTION INTRAMUSCULAR; INTRAVENOUS; SUBCUTANEOUS
Status: DISPENSED
Start: 2022-04-06

## (undated) RX ORDER — PROPOFOL 10 MG/ML
INJECTION, EMULSION INTRAVENOUS
Status: DISPENSED
Start: 2020-07-10

## (undated) RX ORDER — ONDANSETRON 2 MG/ML
INJECTION INTRAMUSCULAR; INTRAVENOUS
Status: DISPENSED
Start: 2022-04-06

## (undated) RX ORDER — PROPOFOL 10 MG/ML
INJECTION, EMULSION INTRAVENOUS
Status: DISPENSED
Start: 2022-04-06

## (undated) RX ORDER — FENTANYL CITRATE 50 UG/ML
INJECTION, SOLUTION INTRAMUSCULAR; INTRAVENOUS
Status: DISPENSED
Start: 2020-07-10

## (undated) RX ORDER — EPHEDRINE SULFATE 50 MG/ML
INJECTION, SOLUTION INTRAVENOUS
Status: DISPENSED
Start: 2020-07-10

## (undated) RX ORDER — KETOROLAC TROMETHAMINE 30 MG/ML
INJECTION, SOLUTION INTRAMUSCULAR; INTRAVENOUS
Status: DISPENSED
Start: 2022-04-06

## (undated) RX ORDER — FENTANYL CITRATE 50 UG/ML
INJECTION, SOLUTION INTRAMUSCULAR; INTRAVENOUS
Status: DISPENSED
Start: 2022-04-06

## (undated) RX ORDER — ALBUTEROL SULFATE 90 UG/1
AEROSOL, METERED RESPIRATORY (INHALATION)
Status: DISPENSED
Start: 2022-04-06

## (undated) RX ORDER — DEXAMETHASONE SODIUM PHOSPHATE 10 MG/ML
INJECTION, SOLUTION INTRAMUSCULAR; INTRAVENOUS
Status: DISPENSED
Start: 2020-07-10